# Patient Record
Sex: FEMALE | Race: WHITE | NOT HISPANIC OR LATINO | URBAN - METROPOLITAN AREA
[De-identification: names, ages, dates, MRNs, and addresses within clinical notes are randomized per-mention and may not be internally consistent; named-entity substitution may affect disease eponyms.]

---

## 2016-03-26 RX ORDER — CARVEDILOL PHOSPHATE 80 MG/1
1 CAPSULE, EXTENDED RELEASE ORAL
Qty: 60 | Refills: 0 | COMMUNITY
Start: 2016-03-26 | End: 2016-04-25

## 2016-03-26 RX ORDER — CARVEDILOL PHOSPHATE 80 MG/1
25 CAPSULE, EXTENDED RELEASE ORAL
Qty: 60 | Refills: 0 | COMMUNITY
Start: 2016-03-26 | End: 2016-04-25

## 2017-02-07 ENCOUNTER — OUTPATIENT (OUTPATIENT)
Dept: OUTPATIENT SERVICES | Facility: HOSPITAL | Age: 62
LOS: 1 days | Discharge: HOME | End: 2017-02-07
Payer: COMMERCIAL

## 2017-02-07 VITALS — WEIGHT: 173.06 LBS | HEIGHT: 60 IN

## 2017-02-07 DIAGNOSIS — Z98.890 OTHER SPECIFIED POSTPROCEDURAL STATES: Chronic | ICD-10-CM

## 2017-02-07 DIAGNOSIS — T85.112A BREAKDOWN (MECHANICAL) OF IMPLANTED ELECTRONIC NEUROSTIMULATOR OF SPINAL CORD ELECTRODE (LEAD), INITIAL ENCOUNTER: ICD-10-CM

## 2017-02-07 DIAGNOSIS — Z95.0 PRESENCE OF CARDIAC PACEMAKER: Chronic | ICD-10-CM

## 2017-02-07 DIAGNOSIS — Z98.89 OTHER SPECIFIED POSTPROCEDURAL STATES: Chronic | ICD-10-CM

## 2017-02-07 DIAGNOSIS — Z90.710 ACQUIRED ABSENCE OF BOTH CERVIX AND UTERUS: Chronic | ICD-10-CM

## 2017-02-07 LAB
ANION GAP SERPL CALC-SCNC: 9 MMOL/L — SIGNIFICANT CHANGE UP (ref 5–17)
BASOPHILS # BLD AUTO: 0.1 K/UL — SIGNIFICANT CHANGE UP (ref 0–0.2)
BASOPHILS NFR BLD AUTO: 0.9 % — SIGNIFICANT CHANGE UP (ref 0–2)
BUN SERPL-MCNC: 19 MG/DL — SIGNIFICANT CHANGE UP (ref 7–23)
CALCIUM SERPL-MCNC: 9 MG/DL — SIGNIFICANT CHANGE UP (ref 8.5–10.1)
CHLORIDE SERPL-SCNC: 108 MMOL/L — SIGNIFICANT CHANGE UP (ref 96–108)
CO2 SERPL-SCNC: 26 MMOL/L — SIGNIFICANT CHANGE UP (ref 22–31)
CREAT SERPL-MCNC: 0.9 MG/DL — SIGNIFICANT CHANGE UP (ref 0.5–1.3)
EOSINOPHIL # BLD AUTO: 0.1 K/UL — SIGNIFICANT CHANGE UP (ref 0–0.5)
EOSINOPHIL NFR BLD AUTO: 1.5 % — SIGNIFICANT CHANGE UP (ref 0–6)
GLUCOSE SERPL-MCNC: 80 MG/DL — SIGNIFICANT CHANGE UP (ref 70–99)
HCT VFR BLD CALC: 41.9 % — SIGNIFICANT CHANGE UP (ref 34.5–45)
HGB BLD-MCNC: 12.1 G/DL — SIGNIFICANT CHANGE UP (ref 11.5–15.5)
INR BLD: 0.97 RATIO — SIGNIFICANT CHANGE UP (ref 0.88–1.16)
LYMPHOCYTES # BLD AUTO: 2.4 K/UL — SIGNIFICANT CHANGE UP (ref 1–3.3)
LYMPHOCYTES # BLD AUTO: 31.8 % — SIGNIFICANT CHANGE UP (ref 13–44)
MCHC RBC-ENTMCNC: 18.8 PG — LOW (ref 27–34)
MCHC RBC-ENTMCNC: 28.8 GM/DL — LOW (ref 32–36)
MCV RBC AUTO: 65 FL — LOW (ref 80–100)
MONOCYTES # BLD AUTO: 0.5 K/UL — SIGNIFICANT CHANGE UP (ref 0–0.9)
MONOCYTES NFR BLD AUTO: 6.8 % — SIGNIFICANT CHANGE UP (ref 2–14)
NEUTROPHILS # BLD AUTO: 4.5 K/UL — SIGNIFICANT CHANGE UP (ref 1.8–7.4)
NEUTROPHILS NFR BLD AUTO: 59 % — SIGNIFICANT CHANGE UP (ref 43–77)
PLATELET # BLD AUTO: 256 K/UL — SIGNIFICANT CHANGE UP (ref 150–400)
POTASSIUM SERPL-MCNC: 4 MMOL/L — SIGNIFICANT CHANGE UP (ref 3.5–5.3)
POTASSIUM SERPL-SCNC: 4 MMOL/L — SIGNIFICANT CHANGE UP (ref 3.5–5.3)
PROTHROM AB SERPL-ACNC: 10.7 SEC — SIGNIFICANT CHANGE UP (ref 10–13.1)
RBC # BLD: 6.44 M/UL — HIGH (ref 3.8–5.2)
RBC # FLD: 14.2 % — SIGNIFICANT CHANGE UP (ref 10.3–14.5)
SODIUM SERPL-SCNC: 143 MMOL/L — SIGNIFICANT CHANGE UP (ref 135–145)
WBC # BLD: 7.6 K/UL — SIGNIFICANT CHANGE UP (ref 3.8–10.5)
WBC # FLD AUTO: 7.6 K/UL — SIGNIFICANT CHANGE UP (ref 3.8–10.5)

## 2017-02-07 PROCEDURE — 93010 ELECTROCARDIOGRAM REPORT: CPT

## 2017-02-07 NOTE — H&P PST ADULT - MOTOR
Strength grading:  Upper extremities B/L 5/5  Lower extremities right: 5/5, left lower extremity 4/5.

## 2017-02-07 NOTE — H&P PST ADULT - PSH
Artificial cardiac pacemaker  Reports "shut off."  H/O abdominal hysterectomy    H/O laminectomy    S/P spinal surgery  S/P spinal cord stimulator placed.

## 2017-02-07 NOTE — H&P PST ADULT - ASSESSMENT
This is a 61 y/o  female who presents to [facility] prior to proposed procedure with Dr. Stern for removal of spinal cord stimulator, electrodes and battery.    1. Labs, MRSA swab: per Dr. Stern.  2. EKG to be reviewed by Cardiology.  3. Clearance with Dr. Singh as scheduled [phone].  4. EZ sponges, holistic sheet, and day of procedure instructions provided and reviewed with patient.

## 2017-02-07 NOTE — H&P PST ADULT - HISTORY OF PRESENT ILLNESS
This is a 61 y/o  female who presents to [facility] prior to proposed procedure with Dr. Stern for removal of spinal cord stimulator, electrodes and battery. Reports PMHX: lumbar disc disorder s/p spinal cord stimulator 7/2016. Reports lower back pain is "getting worse", with back pain which radiates to left buttock to lower extremity. Reports recent urinary incontinence, and wears adult diapers. Denies problems with defecation Reports lower back pain is slightly decreased with Oxycodone 10mg every 4 hours as directed. Evaluated by Dr. Stern and now presents for said procedure.

## 2017-02-07 NOTE — H&P PST ADULT - BACK COMMENTS
Mid-spine palpation with + tenderness to thoracic, lumbosacral region and to left buttock spinal stimulator site.

## 2017-02-07 NOTE — H&P PST ADULT - FAMILY HISTORY
Father  Still living? Unknown  Family history of diabetes mellitus (DM), Age at diagnosis: Age Unknown     Mother  Still living? Unknown  Family history of hypertension, Age at diagnosis: Age Unknown  Family history of cerebrovascular accident (CVA), Age at diagnosis: Age Unknown     Sibling  Still living? Unknown  Family history of uterine cancer, Age at diagnosis: Age Unknown

## 2017-02-07 NOTE — H&P PST ADULT - PMH
CAD (coronary artery disease)    CVA (cerebral vascular accident)    HTN (hypertension)    Low back pain with left-sided sciatica, unspecified back pain laterality, unspecified chronicity    Malignant neoplasm of ovary, unspecified laterality    Pacemaker  reports shut off  Stented coronary artery    Thalassemia, unspecified type    Urinary incontinence, unspecified type    Varicose veins  left lower extremity

## 2017-02-08 LAB
MRSA PCR RESULT.: SIGNIFICANT CHANGE UP
S AUREUS DNA NOSE QL NAA+PROBE: SIGNIFICANT CHANGE UP

## 2017-02-14 ENCOUNTER — RESULT REVIEW (OUTPATIENT)
Age: 62
End: 2017-02-14

## 2017-02-14 RX ORDER — OXYCODONE HYDROCHLORIDE 5 MG/1
10 TABLET ORAL EVERY 6 HOURS
Qty: 0 | Refills: 0 | Status: DISCONTINUED | OUTPATIENT
Start: 2017-02-15 | End: 2017-02-15

## 2017-02-14 RX ORDER — FENTANYL CITRATE 50 UG/ML
50 INJECTION INTRAVENOUS
Qty: 0 | Refills: 0 | Status: DISCONTINUED | OUTPATIENT
Start: 2017-02-15 | End: 2017-02-15

## 2017-02-15 ENCOUNTER — OUTPATIENT (OUTPATIENT)
Dept: OUTPATIENT SERVICES | Facility: HOSPITAL | Age: 62
LOS: 1 days | Discharge: HOME | End: 2017-02-15
Payer: COMMERCIAL

## 2017-02-15 VITALS
OXYGEN SATURATION: 99 % | DIASTOLIC BLOOD PRESSURE: 64 MMHG | SYSTOLIC BLOOD PRESSURE: 118 MMHG | HEART RATE: 63 BPM | RESPIRATION RATE: 16 BRPM

## 2017-02-15 VITALS
HEART RATE: 64 BPM | RESPIRATION RATE: 16 BRPM | OXYGEN SATURATION: 100 % | WEIGHT: 176.37 LBS | DIASTOLIC BLOOD PRESSURE: 80 MMHG | SYSTOLIC BLOOD PRESSURE: 146 MMHG | TEMPERATURE: 98 F

## 2017-02-15 DIAGNOSIS — Z98.890 OTHER SPECIFIED POSTPROCEDURAL STATES: Chronic | ICD-10-CM

## 2017-02-15 DIAGNOSIS — Z98.89 OTHER SPECIFIED POSTPROCEDURAL STATES: Chronic | ICD-10-CM

## 2017-02-15 DIAGNOSIS — Z95.0 PRESENCE OF CARDIAC PACEMAKER: Chronic | ICD-10-CM

## 2017-02-15 DIAGNOSIS — Z90.710 ACQUIRED ABSENCE OF BOTH CERVIX AND UTERUS: Chronic | ICD-10-CM

## 2017-02-15 PROCEDURE — 88300 SURGICAL PATH GROSS: CPT | Mod: 26

## 2017-02-15 RX ORDER — SODIUM CHLORIDE 9 MG/ML
1000 INJECTION, SOLUTION INTRAVENOUS
Qty: 0 | Refills: 0 | Status: DISCONTINUED | OUTPATIENT
Start: 2017-02-15 | End: 2017-03-02

## 2017-02-15 RX ORDER — IBUPROFEN 200 MG
400 TABLET ORAL EVERY 8 HOURS
Qty: 0 | Refills: 0 | Status: DISCONTINUED | OUTPATIENT
Start: 2017-02-15 | End: 2017-03-02

## 2017-02-15 RX ORDER — ONDANSETRON 8 MG/1
4 TABLET, FILM COATED ORAL EVERY 6 HOURS
Qty: 0 | Refills: 0 | Status: DISCONTINUED | OUTPATIENT
Start: 2017-02-15 | End: 2017-03-02

## 2017-02-15 RX ORDER — ONDANSETRON 8 MG/1
4 TABLET, FILM COATED ORAL ONCE
Qty: 0 | Refills: 0 | Status: DISCONTINUED | OUTPATIENT
Start: 2017-02-15 | End: 2017-02-15

## 2017-02-15 RX ORDER — SODIUM CHLORIDE 9 MG/ML
1000 INJECTION, SOLUTION INTRAVENOUS
Qty: 0 | Refills: 0 | Status: DISCONTINUED | OUTPATIENT
Start: 2017-02-15 | End: 2017-02-15

## 2017-02-15 RX ORDER — HYDROMORPHONE HYDROCHLORIDE 2 MG/ML
2 INJECTION INTRAMUSCULAR; INTRAVENOUS; SUBCUTANEOUS EVERY 4 HOURS
Qty: 0 | Refills: 0 | Status: DISCONTINUED | OUTPATIENT
Start: 2017-02-15 | End: 2017-02-15

## 2017-02-15 RX ORDER — OXYCODONE HYDROCHLORIDE 5 MG/1
20 TABLET ORAL EVERY 12 HOURS
Qty: 0 | Refills: 0 | Status: DISCONTINUED | OUTPATIENT
Start: 2017-02-15 | End: 2017-02-15

## 2017-02-15 RX ADMIN — OXYCODONE HYDROCHLORIDE 20 MILLIGRAM(S): 5 TABLET ORAL at 07:39

## 2017-02-15 RX ADMIN — OXYCODONE HYDROCHLORIDE 10 MILLIGRAM(S): 5 TABLET ORAL at 10:05

## 2017-02-15 RX ADMIN — SODIUM CHLORIDE 75 MILLILITER(S): 9 INJECTION, SOLUTION INTRAVENOUS at 10:39

## 2017-02-15 RX ADMIN — FENTANYL CITRATE 50 MICROGRAM(S): 50 INJECTION INTRAVENOUS at 10:15

## 2017-02-15 RX ADMIN — FENTANYL CITRATE 50 MICROGRAM(S): 50 INJECTION INTRAVENOUS at 10:05

## 2017-02-15 NOTE — BRIEF OPERATIVE NOTE - PROCEDURE
Spinal cord stimulator replacement  02/15/2017  Removal or lumbar spinal cord stimulator, electrodes and battery, flouroscopy  Active  NERIS

## 2017-02-15 NOTE — ASU DISCHARGE PLAN (ADULT/PEDIATRIC). - NURSING INSTRUCTIONS
Begin with liquids and light food ( tea, toast, Jello, soups). Advance to what you normally eat. Liquids should taken in adequate amounts today.     CALL the DOCTOR:    -Fever greater than  101F  - Signs  of infection such as : increase pain,swelling,redness,or a bad  smell coming from the wound.  -Excessive amount of bleeding.  - Any pain that appears to be getting worse.  - Vomiting  -  If you have  not urinated 8 hours after surgery or have any difficulty urinating.     A responsible adult should be with you for the rest of the day and night for your safety and to help you if you needed.    Review attached FACT SHEET if applicable.

## 2017-02-22 DIAGNOSIS — Z88.8 ALLERGY STATUS TO OTHER DRUGS, MEDICAMENTS AND BIOLOGICAL SUBSTANCES STATUS: ICD-10-CM

## 2017-02-22 DIAGNOSIS — G89.28 OTHER CHRONIC POSTPROCEDURAL PAIN: ICD-10-CM

## 2017-02-22 DIAGNOSIS — T85.192A OTHER MECHANICAL COMPLICATION OF IMPLANTED ELECTRONIC NEUROSTIMULATOR OF SPINAL CORD ELECTRODE (LEAD), INITIAL ENCOUNTER: ICD-10-CM

## 2017-02-22 DIAGNOSIS — M96.1 POSTLAMINECTOMY SYNDROME, NOT ELSEWHERE CLASSIFIED: ICD-10-CM
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
## 2017-02-22 DIAGNOSIS — Z98.1 ARTHRODESIS STATUS: ICD-10-CM

## 2017-02-22 DIAGNOSIS — I10 ESSENTIAL (PRIMARY) HYPERTENSION: ICD-10-CM

## 2017-02-22 LAB — SURGICAL PATHOLOGY FINAL REPORT - CH: SIGNIFICANT CHANGE UP

## 2017-03-23 ENCOUNTER — OUTPATIENT (OUTPATIENT)
Dept: OUTPATIENT SERVICES | Facility: HOSPITAL | Age: 62
LOS: 1 days | End: 2017-03-23

## 2017-03-23 ENCOUNTER — OUTPATIENT (OUTPATIENT)
Dept: OUTPATIENT SERVICES | Facility: HOSPITAL | Age: 62
LOS: 1 days | End: 2017-03-23
Payer: COMMERCIAL

## 2017-03-23 DIAGNOSIS — M46.1 SACROILIITIS, NOT ELSEWHERE CLASSIFIED: ICD-10-CM

## 2017-03-23 DIAGNOSIS — Z98.890 OTHER SPECIFIED POSTPROCEDURAL STATES: Chronic | ICD-10-CM

## 2017-03-23 DIAGNOSIS — Z90.710 ACQUIRED ABSENCE OF BOTH CERVIX AND UTERUS: Chronic | ICD-10-CM

## 2017-03-23 DIAGNOSIS — Z95.0 PRESENCE OF CARDIAC PACEMAKER: Chronic | ICD-10-CM

## 2017-03-23 DIAGNOSIS — Z98.89 OTHER SPECIFIED POSTPROCEDURAL STATES: Chronic | ICD-10-CM

## 2017-04-06 ENCOUNTER — TRANSCRIPTION ENCOUNTER (OUTPATIENT)
Age: 62
End: 2017-04-06

## 2017-04-06 ENCOUNTER — OUTPATIENT (OUTPATIENT)
Dept: OUTPATIENT SERVICES | Facility: HOSPITAL | Age: 62
LOS: 1 days | End: 2017-04-06
Payer: COMMERCIAL

## 2017-04-06 DIAGNOSIS — Z98.890 OTHER SPECIFIED POSTPROCEDURAL STATES: Chronic | ICD-10-CM

## 2017-04-06 DIAGNOSIS — Z95.0 PRESENCE OF CARDIAC PACEMAKER: Chronic | ICD-10-CM

## 2017-04-06 DIAGNOSIS — Z98.89 OTHER SPECIFIED POSTPROCEDURAL STATES: Chronic | ICD-10-CM

## 2017-04-06 DIAGNOSIS — Z90.710 ACQUIRED ABSENCE OF BOTH CERVIX AND UTERUS: Chronic | ICD-10-CM

## 2017-04-06 DIAGNOSIS — M47.817 SPONDYLOSIS WITHOUT MYELOPATHY OR RADICULOPATHY, LUMBOSACRAL REGION: ICD-10-CM

## 2017-04-06 PROCEDURE — 64494 INJ PARAVERT F JNT L/S 2 LEV: CPT | Mod: 50

## 2017-04-06 PROCEDURE — 64493 INJ PARAVERT F JNT L/S 1 LEV: CPT | Mod: 50

## 2017-04-06 PROCEDURE — 76000 FLUOROSCOPY <1 HR PHYS/QHP: CPT

## 2017-04-20 ENCOUNTER — OUTPATIENT (OUTPATIENT)
Dept: OUTPATIENT SERVICES | Facility: HOSPITAL | Age: 62
LOS: 1 days | End: 2017-04-20
Payer: COMMERCIAL

## 2017-04-20 ENCOUNTER — TRANSCRIPTION ENCOUNTER (OUTPATIENT)
Age: 62
End: 2017-04-20

## 2017-04-20 DIAGNOSIS — Z95.0 PRESENCE OF CARDIAC PACEMAKER: Chronic | ICD-10-CM

## 2017-04-20 DIAGNOSIS — Z98.89 OTHER SPECIFIED POSTPROCEDURAL STATES: Chronic | ICD-10-CM

## 2017-04-20 DIAGNOSIS — Z98.890 OTHER SPECIFIED POSTPROCEDURAL STATES: Chronic | ICD-10-CM

## 2017-04-20 DIAGNOSIS — Z90.710 ACQUIRED ABSENCE OF BOTH CERVIX AND UTERUS: Chronic | ICD-10-CM

## 2017-04-20 DIAGNOSIS — M46.1 SACROILIITIS, NOT ELSEWHERE CLASSIFIED: ICD-10-CM

## 2017-04-20 PROCEDURE — G0260: CPT | Mod: 50

## 2017-04-20 PROCEDURE — 76000 FLUOROSCOPY <1 HR PHYS/QHP: CPT

## 2017-04-27 ENCOUNTER — OUTPATIENT (OUTPATIENT)
Dept: OUTPATIENT SERVICES | Facility: HOSPITAL | Age: 62
LOS: 1 days | End: 2017-04-27
Payer: COMMERCIAL

## 2017-04-27 DIAGNOSIS — M46.1 SACROILIITIS, NOT ELSEWHERE CLASSIFIED: ICD-10-CM

## 2017-04-27 DIAGNOSIS — Z98.890 OTHER SPECIFIED POSTPROCEDURAL STATES: Chronic | ICD-10-CM

## 2017-04-27 DIAGNOSIS — Z98.89 OTHER SPECIFIED POSTPROCEDURAL STATES: Chronic | ICD-10-CM

## 2017-04-27 DIAGNOSIS — Z95.0 PRESENCE OF CARDIAC PACEMAKER: Chronic | ICD-10-CM

## 2017-04-27 DIAGNOSIS — M54.16 RADICULOPATHY, LUMBAR REGION: ICD-10-CM

## 2017-04-27 DIAGNOSIS — Z90.710 ACQUIRED ABSENCE OF BOTH CERVIX AND UTERUS: Chronic | ICD-10-CM

## 2017-04-28 ENCOUNTER — TRANSCRIPTION ENCOUNTER (OUTPATIENT)
Age: 62
End: 2017-04-28

## 2017-05-11 ENCOUNTER — OUTPATIENT (OUTPATIENT)
Dept: OUTPATIENT SERVICES | Facility: HOSPITAL | Age: 62
LOS: 1 days | End: 2017-05-11

## 2017-05-11 ENCOUNTER — TRANSCRIPTION ENCOUNTER (OUTPATIENT)
Age: 62
End: 2017-05-11

## 2017-05-11 DIAGNOSIS — Z98.89 OTHER SPECIFIED POSTPROCEDURAL STATES: Chronic | ICD-10-CM

## 2017-05-11 DIAGNOSIS — M47.817 SPONDYLOSIS WITHOUT MYELOPATHY OR RADICULOPATHY, LUMBOSACRAL REGION: ICD-10-CM

## 2017-05-11 DIAGNOSIS — Z90.710 ACQUIRED ABSENCE OF BOTH CERVIX AND UTERUS: Chronic | ICD-10-CM

## 2017-05-11 DIAGNOSIS — Z95.0 PRESENCE OF CARDIAC PACEMAKER: Chronic | ICD-10-CM

## 2017-05-11 DIAGNOSIS — Z98.890 OTHER SPECIFIED POSTPROCEDURAL STATES: Chronic | ICD-10-CM

## 2017-05-11 PROCEDURE — 76000 FLUOROSCOPY <1 HR PHYS/QHP: CPT

## 2017-05-12 ENCOUNTER — INPATIENT (INPATIENT)
Facility: HOSPITAL | Age: 62
LOS: 10 days | Discharge: NURSING, REHAB OR LONG-TERM CARE | DRG: 331 | End: 2017-05-23
Attending: SURGERY | Admitting: SURGERY
Payer: MEDICARE

## 2017-05-12 ENCOUNTER — RESULT REVIEW (OUTPATIENT)
Age: 62
End: 2017-05-12

## 2017-05-12 VITALS
HEART RATE: 71 BPM | OXYGEN SATURATION: 98 % | RESPIRATION RATE: 16 BRPM | WEIGHT: 175.05 LBS | DIASTOLIC BLOOD PRESSURE: 84 MMHG | TEMPERATURE: 98 F | SYSTOLIC BLOOD PRESSURE: 184 MMHG

## 2017-05-12 DIAGNOSIS — K56.69 OTHER INTESTINAL OBSTRUCTION: ICD-10-CM

## 2017-05-12 DIAGNOSIS — Z98.89 OTHER SPECIFIED POSTPROCEDURAL STATES: Chronic | ICD-10-CM

## 2017-05-12 DIAGNOSIS — Z98.890 OTHER SPECIFIED POSTPROCEDURAL STATES: Chronic | ICD-10-CM

## 2017-05-12 DIAGNOSIS — Z95.0 PRESENCE OF CARDIAC PACEMAKER: Chronic | ICD-10-CM

## 2017-05-12 DIAGNOSIS — Z90.710 ACQUIRED ABSENCE OF BOTH CERVIX AND UTERUS: Chronic | ICD-10-CM

## 2017-05-12 LAB
ANION GAP SERPL CALC-SCNC: 16 MMOL/L — SIGNIFICANT CHANGE UP (ref 5–17)
APTT BLD: 32.9 SEC — SIGNIFICANT CHANGE UP (ref 27.5–37.4)
BLD GP AB SCN SERPL QL: SIGNIFICANT CHANGE UP
BUN SERPL-MCNC: 19 MG/DL — SIGNIFICANT CHANGE UP (ref 8–20)
CALCIUM SERPL-MCNC: 9.4 MG/DL — SIGNIFICANT CHANGE UP (ref 8.6–10.2)
CHLORIDE SERPL-SCNC: 102 MMOL/L — SIGNIFICANT CHANGE UP (ref 98–107)
CO2 SERPL-SCNC: 24 MMOL/L — SIGNIFICANT CHANGE UP (ref 22–29)
CREAT SERPL-MCNC: 0.74 MG/DL — SIGNIFICANT CHANGE UP (ref 0.5–1.3)
GLUCOSE SERPL-MCNC: 127 MG/DL — HIGH (ref 70–115)
HCT VFR BLD CALC: 36.9 % — LOW (ref 37–47)
HGB BLD-MCNC: 11.9 G/DL — LOW (ref 12–16)
INR BLD: 1.05 RATIO — SIGNIFICANT CHANGE UP (ref 0.88–1.16)
LACTATE BLDV-MCNC: 0.7 MMOL/L — SIGNIFICANT CHANGE UP (ref 0.7–2)
MCHC RBC-ENTMCNC: 20.8 PG — LOW (ref 27–31)
MCHC RBC-ENTMCNC: 32.2 G/DL — SIGNIFICANT CHANGE UP (ref 32–36)
MCV RBC AUTO: 64.5 FL — LOW (ref 81–99)
PLATELET # BLD AUTO: 209 K/UL — SIGNIFICANT CHANGE UP (ref 150–400)
POTASSIUM SERPL-MCNC: 4.1 MMOL/L — SIGNIFICANT CHANGE UP (ref 3.5–5.3)
POTASSIUM SERPL-SCNC: 4.1 MMOL/L — SIGNIFICANT CHANGE UP (ref 3.5–5.3)
PROTHROM AB SERPL-ACNC: 11.6 SEC — SIGNIFICANT CHANGE UP (ref 9.8–12.7)
RBC # BLD: 5.72 M/UL — HIGH (ref 4.4–5.2)
RBC # FLD: 17.1 % — HIGH (ref 11–15.6)
SODIUM SERPL-SCNC: 142 MMOL/L — SIGNIFICANT CHANGE UP (ref 135–145)
TYPE + AB SCN PNL BLD: SIGNIFICANT CHANGE UP
WBC # BLD: 12.6 K/UL — HIGH (ref 4.8–10.8)
WBC # FLD AUTO: 12.6 K/UL — HIGH (ref 4.8–10.8)

## 2017-05-12 PROCEDURE — 99223 1ST HOSP IP/OBS HIGH 75: CPT | Mod: 57,GC

## 2017-05-12 PROCEDURE — 62323 NJX INTERLAMINAR LMBR/SAC: CPT

## 2017-05-12 PROCEDURE — 77003 FLUOROGUIDE FOR SPINE INJECT: CPT

## 2017-05-12 PROCEDURE — 44625 REPAIR BOWEL OPENING: CPT

## 2017-05-12 PROCEDURE — 71010: CPT | Mod: 26

## 2017-05-12 PROCEDURE — 74177 CT ABD & PELVIS W/CONTRAST: CPT | Mod: 26

## 2017-05-12 PROCEDURE — 88307 TISSUE EXAM BY PATHOLOGIST: CPT | Mod: 26

## 2017-05-12 PROCEDURE — 99285 EMERGENCY DEPT VISIT HI MDM: CPT

## 2017-05-12 PROCEDURE — 93010 ELECTROCARDIOGRAM REPORT: CPT

## 2017-05-12 PROCEDURE — 72131 CT LUMBAR SPINE W/O DYE: CPT | Mod: 26

## 2017-05-12 RX ORDER — FENTANYL CITRATE 50 UG/ML
25 INJECTION INTRAVENOUS ONCE
Qty: 0 | Refills: 0 | Status: DISCONTINUED | OUTPATIENT
Start: 2017-05-12 | End: 2017-05-12

## 2017-05-12 RX ORDER — NALOXONE HYDROCHLORIDE 4 MG/.1ML
0.1 SPRAY NASAL
Qty: 0 | Refills: 0 | Status: DISCONTINUED | OUTPATIENT
Start: 2017-05-12 | End: 2017-05-23

## 2017-05-12 RX ORDER — METOCLOPRAMIDE HCL 10 MG
10 TABLET ORAL ONCE
Qty: 0 | Refills: 0 | Status: COMPLETED | OUTPATIENT
Start: 2017-05-12 | End: 2017-05-12

## 2017-05-12 RX ORDER — ENOXAPARIN SODIUM 100 MG/ML
40 INJECTION SUBCUTANEOUS DAILY
Qty: 0 | Refills: 0 | Status: DISCONTINUED | OUTPATIENT
Start: 2017-05-12 | End: 2017-05-23

## 2017-05-12 RX ORDER — CEFOTETAN DISODIUM 1 G
3 VIAL (EA) INJECTION ONCE
Qty: 0 | Refills: 0 | Status: DISCONTINUED | OUTPATIENT
Start: 2017-05-12 | End: 2017-05-12

## 2017-05-12 RX ORDER — ONDANSETRON 8 MG/1
4 TABLET, FILM COATED ORAL ONCE
Qty: 0 | Refills: 0 | Status: DISCONTINUED | OUTPATIENT
Start: 2017-05-12 | End: 2017-05-13

## 2017-05-12 RX ORDER — METOPROLOL TARTRATE 50 MG
5 TABLET ORAL EVERY 6 HOURS
Qty: 0 | Refills: 0 | Status: DISCONTINUED | OUTPATIENT
Start: 2017-05-12 | End: 2017-05-15

## 2017-05-12 RX ORDER — SODIUM CHLORIDE 9 MG/ML
1000 INJECTION INTRAMUSCULAR; INTRAVENOUS; SUBCUTANEOUS
Qty: 0 | Refills: 0 | Status: DISCONTINUED | OUTPATIENT
Start: 2017-05-12 | End: 2017-05-12

## 2017-05-12 RX ORDER — SODIUM CHLORIDE 9 MG/ML
3 INJECTION INTRAMUSCULAR; INTRAVENOUS; SUBCUTANEOUS ONCE
Qty: 0 | Refills: 0 | Status: COMPLETED | OUTPATIENT
Start: 2017-05-12 | End: 2017-05-12

## 2017-05-12 RX ORDER — SODIUM CHLORIDE 9 MG/ML
1000 INJECTION, SOLUTION INTRAVENOUS
Qty: 0 | Refills: 0 | Status: DISCONTINUED | OUTPATIENT
Start: 2017-05-12 | End: 2017-05-18

## 2017-05-12 RX ORDER — CEFOTETAN DISODIUM 1 G
2 VIAL (EA) INJECTION ONCE
Qty: 0 | Refills: 0 | Status: DISCONTINUED | OUTPATIENT
Start: 2017-05-12 | End: 2017-05-12

## 2017-05-12 RX ORDER — ENOXAPARIN SODIUM 100 MG/ML
30 INJECTION SUBCUTANEOUS EVERY 12 HOURS
Qty: 0 | Refills: 0 | Status: DISCONTINUED | OUTPATIENT
Start: 2017-05-12 | End: 2017-05-12

## 2017-05-12 RX ORDER — ONDANSETRON 8 MG/1
4 TABLET, FILM COATED ORAL EVERY 6 HOURS
Qty: 0 | Refills: 0 | Status: COMPLETED | OUTPATIENT
Start: 2017-05-12 | End: 2017-05-12

## 2017-05-12 RX ORDER — PANTOPRAZOLE SODIUM 20 MG/1
40 TABLET, DELAYED RELEASE ORAL DAILY
Qty: 0 | Refills: 0 | Status: DISCONTINUED | OUTPATIENT
Start: 2017-05-12 | End: 2017-05-21

## 2017-05-12 RX ORDER — SODIUM CHLORIDE 9 MG/ML
1000 INJECTION, SOLUTION INTRAVENOUS
Qty: 0 | Refills: 0 | Status: DISCONTINUED | OUTPATIENT
Start: 2017-05-12 | End: 2017-05-12

## 2017-05-12 RX ORDER — SODIUM CHLORIDE 9 MG/ML
1000 INJECTION, SOLUTION INTRAVENOUS
Qty: 0 | Refills: 0 | Status: DISCONTINUED | OUTPATIENT
Start: 2017-05-12 | End: 2017-05-13

## 2017-05-12 RX ORDER — HYDROMORPHONE HYDROCHLORIDE 2 MG/ML
30 INJECTION INTRAMUSCULAR; INTRAVENOUS; SUBCUTANEOUS
Qty: 0 | Refills: 0 | Status: DISCONTINUED | OUTPATIENT
Start: 2017-05-12 | End: 2017-05-16

## 2017-05-12 RX ORDER — ACETAMINOPHEN 500 MG
1000 TABLET ORAL ONCE
Qty: 0 | Refills: 0 | Status: COMPLETED | OUTPATIENT
Start: 2017-05-12 | End: 2017-05-12

## 2017-05-12 RX ORDER — HYDROMORPHONE HYDROCHLORIDE 2 MG/ML
0.5 INJECTION INTRAMUSCULAR; INTRAVENOUS; SUBCUTANEOUS
Qty: 0 | Refills: 0 | Status: DISCONTINUED | OUTPATIENT
Start: 2017-05-12 | End: 2017-05-13

## 2017-05-12 RX ORDER — FENTANYL CITRATE 50 UG/ML
50 INJECTION INTRAVENOUS
Qty: 0 | Refills: 0 | Status: DISCONTINUED | OUTPATIENT
Start: 2017-05-12 | End: 2017-05-13

## 2017-05-12 RX ORDER — FENTANYL CITRATE 50 UG/ML
25 INJECTION INTRAVENOUS
Qty: 0 | Refills: 0 | Status: DISCONTINUED | OUTPATIENT
Start: 2017-05-12 | End: 2017-05-13

## 2017-05-12 RX ORDER — MORPHINE SULFATE 50 MG/1
2 CAPSULE, EXTENDED RELEASE ORAL EVERY 4 HOURS
Qty: 0 | Refills: 0 | Status: DISCONTINUED | OUTPATIENT
Start: 2017-05-12 | End: 2017-05-12

## 2017-05-12 RX ORDER — ONDANSETRON 8 MG/1
4 TABLET, FILM COATED ORAL EVERY 6 HOURS
Qty: 0 | Refills: 0 | Status: DISCONTINUED | OUTPATIENT
Start: 2017-05-12 | End: 2017-05-23

## 2017-05-12 RX ORDER — CEFOTETAN DISODIUM 1 G
2 VIAL (EA) INJECTION EVERY 12 HOURS
Qty: 0 | Refills: 0 | Status: COMPLETED | OUTPATIENT
Start: 2017-05-12 | End: 2017-05-13

## 2017-05-12 RX ORDER — OXYCODONE HYDROCHLORIDE 5 MG/1
2 TABLET ORAL
Qty: 0 | Refills: 0 | COMMUNITY

## 2017-05-12 RX ADMIN — Medication 5 MILLIGRAM(S): at 23:23

## 2017-05-12 RX ADMIN — SODIUM CHLORIDE 125 MILLILITER(S): 9 INJECTION, SOLUTION INTRAVENOUS at 23:20

## 2017-05-12 RX ADMIN — Medication 400 MILLIGRAM(S): at 16:02

## 2017-05-12 RX ADMIN — Medication 400 MILLIGRAM(S): at 20:30

## 2017-05-12 RX ADMIN — FENTANYL CITRATE 25 MICROGRAM(S): 50 INJECTION INTRAVENOUS at 17:49

## 2017-05-12 RX ADMIN — ONDANSETRON 4 MILLIGRAM(S): 8 TABLET, FILM COATED ORAL at 17:56

## 2017-05-12 RX ADMIN — Medication 10 MILLIGRAM(S): at 11:42

## 2017-05-12 RX ADMIN — FENTANYL CITRATE 25 MICROGRAM(S): 50 INJECTION INTRAVENOUS at 12:07

## 2017-05-12 RX ADMIN — FENTANYL CITRATE 25 MICROGRAM(S): 50 INJECTION INTRAVENOUS at 18:10

## 2017-05-12 RX ADMIN — MORPHINE SULFATE 2 MILLIGRAM(S): 50 CAPSULE, EXTENDED RELEASE ORAL at 17:49

## 2017-05-12 RX ADMIN — FENTANYL CITRATE 25 MICROGRAM(S): 50 INJECTION INTRAVENOUS at 16:02

## 2017-05-12 RX ADMIN — SODIUM CHLORIDE 3 MILLILITER(S): 9 INJECTION INTRAMUSCULAR; INTRAVENOUS; SUBCUTANEOUS at 11:42

## 2017-05-12 RX ADMIN — SODIUM CHLORIDE 100 MILLILITER(S): 9 INJECTION INTRAMUSCULAR; INTRAVENOUS; SUBCUTANEOUS at 15:59

## 2017-05-12 RX ADMIN — FENTANYL CITRATE 25 MICROGRAM(S): 50 INJECTION INTRAVENOUS at 11:42

## 2017-05-12 RX ADMIN — FENTANYL CITRATE 25 MICROGRAM(S): 50 INJECTION INTRAVENOUS at 12:06

## 2017-05-12 RX ADMIN — MORPHINE SULFATE 2 MILLIGRAM(S): 50 CAPSULE, EXTENDED RELEASE ORAL at 18:11

## 2017-05-12 RX ADMIN — HYDROMORPHONE HYDROCHLORIDE 0.5 MILLIGRAM(S): 2 INJECTION INTRAMUSCULAR; INTRAVENOUS; SUBCUTANEOUS at 23:00

## 2017-05-12 RX ADMIN — HYDROMORPHONE HYDROCHLORIDE 30 MILLILITER(S): 2 INJECTION INTRAMUSCULAR; INTRAVENOUS; SUBCUTANEOUS at 23:16

## 2017-05-12 RX ADMIN — Medication 1000 MILLIGRAM(S): at 16:58

## 2017-05-12 RX ADMIN — HYDROMORPHONE HYDROCHLORIDE 0.5 MILLIGRAM(S): 2 INJECTION INTRAMUSCULAR; INTRAVENOUS; SUBCUTANEOUS at 23:05

## 2017-05-12 NOTE — H&P ADULT - HISTORY OF PRESENT ILLNESS
61 y/o F with chronic back pain presented to ED with complaints of back pain, radiating to the front, dull aching and colicky in type, 10/10 on intensity a/w nausea and multiple episode of greenish yellow vomiting since yesterday after getting a epidural injection for her chronic back pain. She never had abdominal pain before  She had her last bowel movement yesterday  She had a accident few years back after which she is having severe pain in his back. She had multiple surgery of her back and taken 3 epidural injection in the past few months.  PMH: Stroke 3 years ago, thalassemia, HTN  PSH: hysterectomy with oophorectomy  She has a non working pacemaker in place 61 y/o F with chronic back pain presented to ED with complaints of back pain, radiating to the front, dull aching and colicky in type, 10/10 on intensity a/w nausea and multiple episode of greenish yellow vomiting since yesterday after getting a epidural injection for her chronic back pain. She never had abdominal pain before  She had her last bowel movement yesterday  She had a accident few years back after which she is having severe pain in his back. She had multiple surgery of her back and taken 3 epidural injection in the past few months.  PMH: Stroke 3 years ago, thalassemia, HTN  PSH: hysterectomy with oophorectomy, gastric bypass >30yr ago, cholecystectomy   She has a non working pacemaker in place 63 y/o F with chronic back pain presented to ED with complaints of back pain, radiating to the front, dull aching and colicky in type, 10/10 on intensity a/w nausea and multiple episode of greenish yellow vomiting since yesterday after getting a epidural injection for her chronic back pain. She never had abdominal pain before but her family endorses episodes of "obstruction."   She had her last bowel movement yesterday.  Her last meal was lunch.    She had a accident few years back after which she is having severe pain in his back. She had multiple surgery of her back and taken 3 epidural injection in the past few months.  PMH: Stroke 3 years ago, thalassemia, HTN  PSH: hysterectomy with oophorectomy, gastric bypass >30yr ago, cholecystectomy   She has a non working pacemaker in place

## 2017-05-12 NOTE — ED PROVIDER NOTE - OBJECTIVE STATEMENT
PT STATES SHE HAD AN EPIDURAL YESTERDAY AND THIS MORNING WOKE UP WITH SEVERE LOWER ABDOMINAL PAIN AND NEW ONSET OF URINARY INCONTINENCE.  HAS NEVER HAD THIS KIND OF REACTION TO EPIDURAL. NO INCONTINENCE OF STOOL. NO FEVER. DESCRIBES PAIN AS SHARP AND STABBING AND GOING FROM THE BACK THROUGH TO THE LOWER ABDOMEN.

## 2017-05-12 NOTE — H&P ADULT - ATTENDING COMMENTS
I have read, reviewed, edited and agree c/ above H+P.  I have personally reviewed her CT scan and gone thru the images c/ Mrs. Lawler and her .  I have counseled them that c/ the injection of the mesenteric vessels, the dilation of the SB, her WBC count, and her significant focal tenderness are indications that she may have compromised bowel.  I have discussed the risks/benefits of urgent surgery c/ her, her neice, , and sister.  The risks include blood loss, infection, bowel resection, ostomy, need for further surgery, MI, stroke, death.  I have counseled them that the stress of surgery c/ the fluid shifts may cause stress on her heart and lungs that will necessitate further monitoring.  They understand the significant risks of surgery but also that this is an urgent, life saving procedure that needs to be done.  They are all agreeable to proceeding c/ surgery.

## 2017-05-12 NOTE — ED PROVIDER NOTE - MEDICAL DECISION MAKING DETAILS
SEVERE ABDOMINAL PAIN AND URINARY INCONTINENCE S/P EPIDURAL INJECTION YESTERDAY. NEEDS CT AB/PELVIS. WILL FOLLOW. * UNABLE TO HAVE MRI BC HAS PACEMAKER/ ELECTRIC MUSCLE STIMULATOR.

## 2017-05-12 NOTE — ED ADULT NURSE NOTE - OBJECTIVE STATEMENT
pt c/o back pain radiating to lower abdomen with nausea and vomiting. pt states that she had back procedure done yesterday by Dr. Jeff, pt has dressing to mid lower back, incision site shows no signs of infection. + Bowel sounds X4 tenderness to mid lower quadrant. pt states she loss control of urine last night and was more frequent denies loss of bowels

## 2017-05-12 NOTE — BRIEF OPERATIVE NOTE - PROCEDURE
Vacuum-assisted closure (VAC) of wound of abdomen  05/12/2017    Active  MARYELLEN  Lysis of adhesions  05/12/2017    Active  MARYELLEN  Exploratory laparotomy  05/12/2017    Active  MARYELLEN Small bowel resection  05/12/2017    Active  [email or web address]

## 2017-05-12 NOTE — ED PROVIDER NOTE - CARE PLAN
Principal Discharge DX:	Abdominal pain, unspecified location Principal Discharge DX:	SBO (small bowel obstruction)

## 2017-05-12 NOTE — ED ADULT TRIAGE NOTE - CHIEF COMPLAINT QUOTE
BIBA, patient is awake and oriented times 3, complains of back pain and nausea, s/p back procedure yesterday

## 2017-05-12 NOTE — ED ADULT NURSE NOTE - CHPI ED SYMPTOMS NEG
no motor function loss/no bowel dysfunction/no neck tenderness/no fatigue/no constipation/no anorexia

## 2017-05-12 NOTE — BRIEF OPERATIVE NOTE - OPERATION/FINDINGS
extensive dense adhesions, normal appearing colon extensive dense adhesions, friable point of obstruction in mid SB, c/ spillage of SB contents, normal appearing colon

## 2017-05-12 NOTE — H&P ADULT - NSHPPHYSICALEXAM_GEN_ALL_CORE
OE:  pt alert, Ox3  Chest: CTAB  CVS: S1S2  Abd: soft, tender at RLQ, LLQ, no guarding and rigidity  CNS: intact, power 5/5 over b/l upper extremity, 4/5 on left and 5/5 on the Rt lower extremity  Back: healed scar over the lumbar vertebra of past surgery  tender to palpation OE:  pt alert, Ox3  Chest: CTAB  CVS: S1S2  Abd: soft, obese, mod distended.  Prior ex laps scars from xyphoid to pubis noted, well healed.  No incisional hernias noted. Tender at RLQ immediately below umbilicus, moderately tender LLQ, +vol guarding   CNS: intact, power 5/5 over b/l upper extremity, 4/5 on left and 5/5 on the Rt lower extremity  Back: healed scar over the lumbar vertebra of past surgery  tender to palpation

## 2017-05-12 NOTE — H&P ADULT - PROBLEM SELECTOR PLAN 1
admit under surgery  NPO with IV fluids  pain managment  order lactate and type/screen  EKG and CXR  possible surgery today admit under surgery  NPO with IV fluids  pain management  order lactate and type/screen  EKG and CXR  possible surgery today

## 2017-05-12 NOTE — ED PROVIDER NOTE - PROGRESS NOTE DETAILS
PT NEEDS CT. AWAITING QUANT. I CALLED LAB AND THEY WILL ADD IT AND RUN IT ON THE BLOOD THEY  HAVE THERE CALLED BY DR YO. HIGH GRADE SBO. TWIST OF MESENTARY  SURGERY CALLED IMMEDIATELY TO EVALUATE. EKG CXR IVF, NPO ORDERED

## 2017-05-13 ENCOUNTER — TRANSCRIPTION ENCOUNTER (OUTPATIENT)
Age: 62
End: 2017-05-13

## 2017-05-13 LAB
ALBUMIN SERPL ELPH-MCNC: 2.7 G/DL — LOW (ref 3.3–5.2)
ALP SERPL-CCNC: 134 U/L — HIGH (ref 40–120)
ALT FLD-CCNC: 184 U/L — HIGH
ANION GAP SERPL CALC-SCNC: 13 MMOL/L — SIGNIFICANT CHANGE UP (ref 5–17)
AST SERPL-CCNC: 312 U/L — HIGH
BILIRUB SERPL-MCNC: 1.9 MG/DL — SIGNIFICANT CHANGE UP (ref 0.4–2)
BUN SERPL-MCNC: 21 MG/DL — HIGH (ref 8–20)
CALCIUM SERPL-MCNC: 8.8 MG/DL — SIGNIFICANT CHANGE UP (ref 8.6–10.2)
CHLORIDE SERPL-SCNC: 108 MMOL/L — HIGH (ref 98–107)
CO2 SERPL-SCNC: 20 MMOL/L — LOW (ref 22–29)
CREAT SERPL-MCNC: 0.8 MG/DL — SIGNIFICANT CHANGE UP (ref 0.5–1.3)
GLUCOSE SERPL-MCNC: 128 MG/DL — HIGH (ref 70–115)
HCT VFR BLD CALC: 36.7 % — LOW (ref 37–47)
HGB BLD-MCNC: 11.5 G/DL — LOW (ref 12–16)
MAGNESIUM SERPL-MCNC: 1.4 MG/DL — LOW (ref 1.6–2.6)
MCHC RBC-ENTMCNC: 20.5 PG — LOW (ref 27–31)
MCHC RBC-ENTMCNC: 31.3 G/DL — LOW (ref 32–36)
MCV RBC AUTO: 65.3 FL — LOW (ref 81–99)
PHOSPHATE SERPL-MCNC: 3.4 MG/DL — SIGNIFICANT CHANGE UP (ref 2.4–4.7)
PLATELET # BLD AUTO: 209 K/UL — SIGNIFICANT CHANGE UP (ref 150–400)
POTASSIUM SERPL-MCNC: 3.4 MMOL/L — LOW (ref 3.5–5.3)
POTASSIUM SERPL-SCNC: 3.4 MMOL/L — LOW (ref 3.5–5.3)
PROT SERPL-MCNC: 5.1 G/DL — LOW (ref 6.6–8.7)
RBC # BLD: 5.62 M/UL — HIGH (ref 4.4–5.2)
RBC # FLD: 17 % — HIGH (ref 11–15.6)
SODIUM SERPL-SCNC: 141 MMOL/L — SIGNIFICANT CHANGE UP (ref 135–145)
WBC # BLD: 4.4 K/UL — LOW (ref 4.8–10.8)
WBC # FLD AUTO: 4.4 K/UL — LOW (ref 4.8–10.8)

## 2017-05-13 RX ORDER — HYDROMORPHONE HYDROCHLORIDE 2 MG/ML
2 INJECTION INTRAMUSCULAR; INTRAVENOUS; SUBCUTANEOUS ONCE
Qty: 0 | Refills: 0 | Status: DISCONTINUED | OUTPATIENT
Start: 2017-05-13 | End: 2017-05-13

## 2017-05-13 RX ORDER — POTASSIUM CHLORIDE 20 MEQ
10 PACKET (EA) ORAL
Qty: 0 | Refills: 0 | Status: COMPLETED | OUTPATIENT
Start: 2017-05-13 | End: 2017-05-13

## 2017-05-13 RX ORDER — MAGNESIUM SULFATE 500 MG/ML
2 VIAL (ML) INJECTION ONCE
Qty: 0 | Refills: 0 | Status: COMPLETED | OUTPATIENT
Start: 2017-05-13 | End: 2017-05-13

## 2017-05-13 RX ORDER — HYDROMORPHONE HYDROCHLORIDE 2 MG/ML
1 INJECTION INTRAMUSCULAR; INTRAVENOUS; SUBCUTANEOUS
Qty: 0 | Refills: 0 | Status: DISCONTINUED | OUTPATIENT
Start: 2017-05-13 | End: 2017-05-16

## 2017-05-13 RX ORDER — HYDROMORPHONE HYDROCHLORIDE 2 MG/ML
1 INJECTION INTRAMUSCULAR; INTRAVENOUS; SUBCUTANEOUS ONCE
Qty: 0 | Refills: 0 | Status: DISCONTINUED | OUTPATIENT
Start: 2017-05-13 | End: 2017-05-13

## 2017-05-13 RX ORDER — ACETAMINOPHEN 500 MG
1000 TABLET ORAL ONCE
Qty: 0 | Refills: 0 | Status: COMPLETED | OUTPATIENT
Start: 2017-05-13 | End: 2017-05-13

## 2017-05-13 RX ORDER — POTASSIUM CHLORIDE 20 MEQ
20 PACKET (EA) ORAL
Qty: 0 | Refills: 0 | Status: COMPLETED | OUTPATIENT
Start: 2017-05-13 | End: 2017-05-13

## 2017-05-13 RX ORDER — MAGNESIUM SULFATE 500 MG/ML
2 VIAL (ML) INJECTION EVERY 4 HOURS
Qty: 0 | Refills: 0 | Status: COMPLETED | OUTPATIENT
Start: 2017-05-13 | End: 2017-05-13

## 2017-05-13 RX ADMIN — SODIUM CHLORIDE 125 MILLILITER(S): 9 INJECTION, SOLUTION INTRAVENOUS at 10:54

## 2017-05-13 RX ADMIN — Medication 100 MILLIEQUIVALENT(S): at 21:23

## 2017-05-13 RX ADMIN — HYDROMORPHONE HYDROCHLORIDE 30 MILLILITER(S): 2 INJECTION INTRAMUSCULAR; INTRAVENOUS; SUBCUTANEOUS at 20:09

## 2017-05-13 RX ADMIN — Medication 50 GRAM(S): at 22:09

## 2017-05-13 RX ADMIN — SODIUM CHLORIDE 125 MILLILITER(S): 9 INJECTION, SOLUTION INTRAVENOUS at 19:48

## 2017-05-13 RX ADMIN — Medication 5 MILLIGRAM(S): at 23:30

## 2017-05-13 RX ADMIN — Medication 100 MILLIEQUIVALENT(S): at 19:48

## 2017-05-13 RX ADMIN — HYDROMORPHONE HYDROCHLORIDE 30 MILLILITER(S): 2 INJECTION INTRAMUSCULAR; INTRAVENOUS; SUBCUTANEOUS at 07:43

## 2017-05-13 RX ADMIN — ONDANSETRON 4 MILLIGRAM(S): 8 TABLET, FILM COATED ORAL at 13:05

## 2017-05-13 RX ADMIN — Medication 5 MILLIGRAM(S): at 13:03

## 2017-05-13 RX ADMIN — HYDROMORPHONE HYDROCHLORIDE 2 MILLIGRAM(S): 2 INJECTION INTRAMUSCULAR; INTRAVENOUS; SUBCUTANEOUS at 13:17

## 2017-05-13 RX ADMIN — Medication 50 MILLIEQUIVALENT(S): at 04:33

## 2017-05-13 RX ADMIN — Medication 100 GRAM(S): at 06:14

## 2017-05-13 RX ADMIN — PANTOPRAZOLE SODIUM 40 MILLIGRAM(S): 20 TABLET, DELAYED RELEASE ORAL at 13:03

## 2017-05-13 RX ADMIN — Medication 1000 MILLIGRAM(S): at 13:15

## 2017-05-13 RX ADMIN — Medication 400 MILLIGRAM(S): at 13:00

## 2017-05-13 RX ADMIN — Medication 5 MILLIGRAM(S): at 06:13

## 2017-05-13 RX ADMIN — Medication 5 MILLIGRAM(S): at 18:37

## 2017-05-13 RX ADMIN — HYDROMORPHONE HYDROCHLORIDE 1 MILLIGRAM(S): 2 INJECTION INTRAMUSCULAR; INTRAVENOUS; SUBCUTANEOUS at 20:00

## 2017-05-13 RX ADMIN — HYDROMORPHONE HYDROCHLORIDE 2 MILLIGRAM(S): 2 INJECTION INTRAMUSCULAR; INTRAVENOUS; SUBCUTANEOUS at 13:02

## 2017-05-13 RX ADMIN — HYDROMORPHONE HYDROCHLORIDE 1 MILLIGRAM(S): 2 INJECTION INTRAMUSCULAR; INTRAVENOUS; SUBCUTANEOUS at 19:48

## 2017-05-13 RX ADMIN — Medication 100 MILLIEQUIVALENT(S): at 18:36

## 2017-05-13 RX ADMIN — Medication 100 GRAM(S): at 18:36

## 2017-05-13 RX ADMIN — Medication 50 GRAM(S): at 02:03

## 2017-05-13 RX ADMIN — Medication 50 MILLIEQUIVALENT(S): at 03:12

## 2017-05-13 RX ADMIN — Medication 50 GRAM(S): at 09:34

## 2017-05-13 RX ADMIN — ENOXAPARIN SODIUM 40 MILLIGRAM(S): 100 INJECTION SUBCUTANEOUS at 13:03

## 2017-05-13 NOTE — PROGRESS NOTE ADULT - SUBJECTIVE AND OBJECTIVE BOX
pt seen and examined at bed side  POD 1 for ex lap, HATTIE and small bowel resection  denies N/V/F  NG tube and preston in place  VAC in place    MEDICATIONS  (STANDING):  enoxaparin Injectable 40milliGRAM(s) SubCutaneous daily  pantoprazole  Injectable 40milliGRAM(s) IV Push daily  metoprolol Injectable 5milliGRAM(s) IV Push every 6 hours  lactated ringers. 1000milliLiter(s) IV Continuous <Continuous>  HYDROmorphone PCA (1 mG/mL) 30milliLiter(s) PCA Continuous PCA Continuous  cefoTEtan  IVPB 2Gram(s) IV Intermittent every 12 hours  potassium chloride  10 mEq/100 mL IVPB 10milliEquivalent(s) IV Intermittent every 1 hour  magnesium sulfate  IVPB 2Gram(s) IV Intermittent once    MEDICATIONS  (PRN):  naloxone Injectable 0.1milliGRAM(s) IV Push every 3 minutes PRN For ANY of the following changes in patient status:  A. RR LESS THAN 10 breaths per minute, B. Oxygen saturation LESS THAN 90%, C. Sedation score of 6  ondansetron Injectable 4milliGRAM(s) IV Push every 6 hours PRN Nausea      Vital Signs Last 24 Hrs  T(C): 36.7, Max: 36.9 (05-12 @ 16:04)  T(F): 98, Max: 98.4 (05-12 @ 16:04)  HR: 80 (68 - 86)  BP: 141/82 (116/76 - 227/107)  BP(mean): --  RR: 20 (12 - 20)  SpO2: 98% (98% - 100%)    Constitutional: NAD, well-groomed, well-developed  HEENT: PERRLA, EOMI, no drainage or redness, NG in place  Neck: No bruits; no thyromegaly or nodules,  No JVD  Back: Normal spine flexure, No CVA tenderness, No deformity or limitation of movement  Respiratory: Breath Sounds equal & clear to percussion & auscultation, no accessory muscle use  Cardiovascular: Regular rate & rhythm, normal S1, S2; no murmurs, gallops or rubs; no S3, S4  Gastrointestinal: Soft, tender to palpation over the incision site, VAC in place with proper suction  Extremities: No peripheral edema, No cyanosis, clubbing   Vascular: Equal and normal pulses: 2+ peripheral pulses throughout  Neurological: GCS: 15. A&O x 3; no sensory, motor or coordination deficits, normal reflexes  Psychiatric: Normal mood, normal affect  Musculoskeletal: No joint pain, swelling or deformity; no limitation of movement  Skin: No rashes      I&O's Detail    I & Os for current day (as of 13 May 2017 15:11)  =============================================  IN:    lactated ringers.: 625 ml    Total IN: 625 ml  ---------------------------------------------  OUT:    Nasoenteral Tube: 750 ml    Indwelling Catheter - Urethral: 480 ml    Total OUT: 1230 ml  ---------------------------------------------  Total NET: -605 ml      LABS:                        11.5   4.4   )-----------( 209      ( 12 May 2017 23:54 )             36.7     05-12    141  |  108<H>  |  21.0<H>  ----------------------------<  128<H>  3.4<L>   |  20.0<L>  |  0.80    Ca    8.8      12 May 2017 23:54  Phos  3.4     05-12  Mg     1.4     05-12    TPro  5.1<L>  /  Alb  2.7<L>  /  TBili  1.9  /  DBili  x   /  AST  312<H>  /  ALT  184<H>  /  AlkPhos  134<H>  05-12    PT/INR - ( 12 May 2017 12:10 )   PT: 11.6 sec;   INR: 1.05 ratio         PTT - ( 12 May 2017 12:10 )  PTT:32.9 sec      RADIOLOGY & ADDITIONAL STUDIES:

## 2017-05-13 NOTE — PROGRESS NOTE ADULT - PROBLEM SELECTOR PLAN 1
Keep NPO with iv fluids  iv pain management  keep Mcguire in place  monitor NG output  monitor BF  encourage ambulation

## 2017-05-13 NOTE — PROGRESS NOTE ADULT - PROBLEM SELECTOR PLAN 1
-NPO  -IVF hydration LR @ 125  -pain management w/ dPCA  -dvt ppx w/ lovenox and SCD's  -wound VAC to 125mmHg suction  -protonix  -lopressor 5mg IV q6

## 2017-05-13 NOTE — PROGRESS NOTE ADULT - SUBJECTIVE AND OBJECTIVE BOX
POST OP NOTE: s/p exploratory laparotomy, lysis of adhesions, and small bowel resection    SUBJECTIVE: Patient seen and examined at bedside. No acute events since surgery. Pt tolerated the procedure well and without complication. Pt states her abdominal pain is moderate, but well controlled with dilaudid PCA. Pt with preston catheter and adequate urine output. Continues NPO with IVF hydration, NGT in place, 750cc output. Wound VAC applied and in place with good seal. Pt denies nausea, vomiting, chest pain, or shortness of breath.      MEDICATIONS  (STANDING):  enoxaparin Injectable 40milliGRAM(s) SubCutaneous daily  pantoprazole  Injectable 40milliGRAM(s) IV Push daily  metoprolol Injectable 5milliGRAM(s) IV Push every 6 hours  lactated ringers. 1000milliLiter(s) IV Continuous <Continuous>  HYDROmorphone PCA (1 mG/mL) 30milliLiter(s) PCA Continuous PCA Continuous  cefoTEtan  IVPB 2Gram(s) IV Intermittent every 12 hours  magnesium sulfate  IVPB 2Gram(s) IV Intermittent every 4 hours  potassium chloride  20 mEq/100 mL IVPB 20milliEquivalent(s) IV Intermittent every 2 hours    MEDICATIONS  (PRN):  naloxone Injectable 0.1milliGRAM(s) IV Push every 3 minutes PRN For ANY of the following changes in patient status:  A. RR LESS THAN 10 breaths per minute, B. Oxygen saturation LESS THAN 90%, C. Sedation score of 6  ondansetron Injectable 4milliGRAM(s) IV Push every 6 hours PRN Nausea      Vital Signs Last 24 Hrs  T(C): 36.6, Max: 36.9 (05-12 @ 16:04)  T(F): 97.9, Max: 98.4 (05-12 @ 16:04)  HR: 74 (68 - 86)  BP: 139/79 (138/70 - 227/107)  BP(mean): --  RR: 16 (12 - 18)  SpO2: 100% (98% - 100%)    PE  Gen: NAD, AAOx3  Pulm: CTAB  CV: RRR, normal intensity s1/s2  Abd: soft, diffuse tenderness, nondistended, midline woundvac in place with good seal  : preston in place  Ext: moving all extremities  Neuro: GCS 15      I&O's Detail    I & Os for current day (as of 13 May 2017 04:14)  =============================================  IN:    lactated ringers.: 250 ml    Total IN: 250 ml  ---------------------------------------------  OUT:    Nasoenteral Tube: 750 ml    Indwelling Catheter - Urethral: 330 ml    Total OUT: 1080 ml  ---------------------------------------------  Total NET: -830 ml      LABS:                        11.5   4.4   )-----------( 209      ( 12 May 2017 23:54 )             36.7     05-12    141  |  108<H>  |  21.0<H>  ----------------------------<  128<H>  3.4<L>   |  20.0<L>  |  0.80    Ca    8.8      12 May 2017 23:54  Phos  3.4     05-12  Mg     1.4     05-12    TPro  5.1<L>  /  Alb  2.7<L>  /  TBili  1.9  /  DBili  x   /  AST  312<H>  /  ALT  184<H>  /  AlkPhos  134<H>  05-12    PT/INR - ( 12 May 2017 12:10 )   PT: 11.6 sec;   INR: 1.05 ratio         PTT - ( 12 May 2017 12:10 )  PTT:32.9 sec

## 2017-05-14 LAB
ANION GAP SERPL CALC-SCNC: 10 MMOL/L — SIGNIFICANT CHANGE UP (ref 5–17)
ANION GAP SERPL CALC-SCNC: 12 MMOL/L — SIGNIFICANT CHANGE UP (ref 5–17)
BUN SERPL-MCNC: 38 MG/DL — HIGH (ref 8–20)
BUN SERPL-MCNC: 41 MG/DL — HIGH (ref 8–20)
CALCIUM SERPL-MCNC: 7.9 MG/DL — LOW (ref 8.6–10.2)
CALCIUM SERPL-MCNC: 7.9 MG/DL — LOW (ref 8.6–10.2)
CHLORIDE SERPL-SCNC: 106 MMOL/L — SIGNIFICANT CHANGE UP (ref 98–107)
CHLORIDE SERPL-SCNC: 107 MMOL/L — SIGNIFICANT CHANGE UP (ref 98–107)
CO2 SERPL-SCNC: 20 MMOL/L — LOW (ref 22–29)
CO2 SERPL-SCNC: 21 MMOL/L — LOW (ref 22–29)
CREAT SERPL-MCNC: 0.89 MG/DL — SIGNIFICANT CHANGE UP (ref 0.5–1.3)
CREAT SERPL-MCNC: 1.25 MG/DL — SIGNIFICANT CHANGE UP (ref 0.5–1.3)
GLUCOSE SERPL-MCNC: 63 MG/DL — LOW (ref 70–115)
GLUCOSE SERPL-MCNC: 81 MG/DL — SIGNIFICANT CHANGE UP (ref 70–115)
HCT VFR BLD CALC: 28.8 % — LOW (ref 37–47)
HGB BLD-MCNC: 9 G/DL — LOW (ref 12–16)
MAGNESIUM SERPL-MCNC: 3.2 MG/DL — HIGH (ref 1.6–2.6)
MCHC RBC-ENTMCNC: 20.3 PG — LOW (ref 27–31)
MCHC RBC-ENTMCNC: 31.3 G/DL — LOW (ref 32–36)
MCV RBC AUTO: 65 FL — LOW (ref 81–99)
PHOSPHATE SERPL-MCNC: 4.6 MG/DL — SIGNIFICANT CHANGE UP (ref 2.4–4.7)
PLATELET # BLD AUTO: 172 K/UL — SIGNIFICANT CHANGE UP (ref 150–400)
POTASSIUM SERPL-MCNC: 4.5 MMOL/L — SIGNIFICANT CHANGE UP (ref 3.5–5.3)
POTASSIUM SERPL-MCNC: 5 MMOL/L — SIGNIFICANT CHANGE UP (ref 3.5–5.3)
POTASSIUM SERPL-SCNC: 4.5 MMOL/L — SIGNIFICANT CHANGE UP (ref 3.5–5.3)
POTASSIUM SERPL-SCNC: 5 MMOL/L — SIGNIFICANT CHANGE UP (ref 3.5–5.3)
RBC # BLD: 4.43 M/UL — SIGNIFICANT CHANGE UP (ref 4.4–5.2)
RBC # FLD: 17.5 % — HIGH (ref 11–15.6)
SODIUM SERPL-SCNC: 138 MMOL/L — SIGNIFICANT CHANGE UP (ref 135–145)
SODIUM SERPL-SCNC: 138 MMOL/L — SIGNIFICANT CHANGE UP (ref 135–145)
WBC # BLD: 12.4 K/UL — HIGH (ref 4.8–10.8)
WBC # FLD AUTO: 12.4 K/UL — HIGH (ref 4.8–10.8)

## 2017-05-14 RX ORDER — DEXTROSE 50 % IN WATER 50 %
12.5 SYRINGE (ML) INTRAVENOUS ONCE
Qty: 0 | Refills: 0 | Status: DISCONTINUED | OUTPATIENT
Start: 2017-05-14 | End: 2017-05-23

## 2017-05-14 RX ORDER — SODIUM CHLORIDE 9 MG/ML
500 INJECTION INTRAMUSCULAR; INTRAVENOUS; SUBCUTANEOUS ONCE
Qty: 0 | Refills: 0 | Status: COMPLETED | OUTPATIENT
Start: 2017-05-14 | End: 2017-05-14

## 2017-05-14 RX ORDER — DEXTROSE 50 % IN WATER 50 %
12.5 SYRINGE (ML) INTRAVENOUS ONCE
Qty: 0 | Refills: 0 | Status: COMPLETED | OUTPATIENT
Start: 2017-05-14 | End: 2017-05-14

## 2017-05-14 RX ADMIN — Medication 12.5 GRAM(S): at 19:47

## 2017-05-14 RX ADMIN — HYDROMORPHONE HYDROCHLORIDE 1 MILLIGRAM(S): 2 INJECTION INTRAMUSCULAR; INTRAVENOUS; SUBCUTANEOUS at 02:30

## 2017-05-14 RX ADMIN — Medication 5 MILLIGRAM(S): at 17:21

## 2017-05-14 RX ADMIN — SODIUM CHLORIDE 500 MILLILITER(S): 9 INJECTION INTRAMUSCULAR; INTRAVENOUS; SUBCUTANEOUS at 13:17

## 2017-05-14 RX ADMIN — SODIUM CHLORIDE 125 MILLILITER(S): 9 INJECTION, SOLUTION INTRAVENOUS at 21:09

## 2017-05-14 RX ADMIN — ENOXAPARIN SODIUM 40 MILLIGRAM(S): 100 INJECTION SUBCUTANEOUS at 13:15

## 2017-05-14 RX ADMIN — HYDROMORPHONE HYDROCHLORIDE 30 MILLILITER(S): 2 INJECTION INTRAMUSCULAR; INTRAVENOUS; SUBCUTANEOUS at 19:11

## 2017-05-14 RX ADMIN — Medication 5 MILLIGRAM(S): at 06:43

## 2017-05-14 RX ADMIN — Medication 5 MILLIGRAM(S): at 13:17

## 2017-05-14 RX ADMIN — Medication 5 MILLIGRAM(S): at 23:43

## 2017-05-14 RX ADMIN — HYDROMORPHONE HYDROCHLORIDE 30 MILLILITER(S): 2 INJECTION INTRAMUSCULAR; INTRAVENOUS; SUBCUTANEOUS at 07:43

## 2017-05-14 RX ADMIN — PANTOPRAZOLE SODIUM 40 MILLIGRAM(S): 20 TABLET, DELAYED RELEASE ORAL at 13:16

## 2017-05-14 NOTE — PROGRESS NOTE ADULT - SUBJECTIVE AND OBJECTIVE BOX
HPI/OVERNIGHT EVENTS: Patient seen and examined at bedside. She states that she feels "ok" at this time, reporting that PCA is giving better pain control than her regimen was yesterday. She denies passing any flatus or BMs yet. She expresses desire to have her NGT removed. Explained to patient the need to trend output of NGT before removing. She states she has not yet gotten OOB to ambulate. Mcguire remains in place. She denies fever/chills, CP/SOB.     MEDICATIONS  (STANDING):  enoxaparin Injectable 40milliGRAM(s) SubCutaneous daily  pantoprazole  Injectable 40milliGRAM(s) IV Push daily  metoprolol Injectable 5milliGRAM(s) IV Push every 6 hours  lactated ringers. 1000milliLiter(s) IV Continuous <Continuous>  HYDROmorphone PCA (1 mG/mL) 30milliLiter(s) PCA Continuous PCA Continuous    MEDICATIONS  (PRN):  naloxone Injectable 0.1milliGRAM(s) IV Push every 3 minutes PRN For ANY of the following changes in patient status:  A. RR LESS THAN 10 breaths per minute, B. Oxygen saturation LESS THAN 90%, C. Sedation score of 6  ondansetron Injectable 4milliGRAM(s) IV Push every 6 hours PRN Nausea  HYDROmorphone PCA (1 mG/mL) Rescue Clinician Bolus 1milliGRAM(s) IV Push every 3 hours PRN Severe breakthrough pain      Vital Signs Last 24 Hrs  T(C): 36.4, Max: 37.2 (05-13 @ 23:24)  T(F): 97.6, Max: 98.9 (05-13 @ 23:24)  HR: 85 (75 - 90)  BP: 141/75 (109/64 - 142/70)  BP(mean): --  RR: 20 (16 - 20)  SpO2: 98% (96% - 100%)    Constitutional: patient resting in bed, non-toxic appearing, in no acute distress  HEENT: NGT in place to suction, brown output in canister, EOMI / PERRL b/l, no active drainage or redness  Neck: No JVD, full ROM without pain  Respiratory: Breath Sounds CTA b/l, no accessory muscle use, no conversational dyspnea  Cardiovascular: Regular rate & rhythm, +S1, S2  Gastrointestinal: Abdomen soft and non-distended, mild tenderness to palpation, no rebound tenderness / guarding, midline vac in place with good seal, no surrounding erythema or active drainage.   Extremities: No peripheral edema of lower extremities b/l, no cyanosis, clubbing   Vascular: 2+ peripheral pulses throughout  Neurological: GCS: 15 (4/5/6). A&O x 3; no gross sensory / motor / coordination deficits  Psychiatric: Normal mood, normal affect  Musculoskeletal: No joint pain, swelling or deformity; no limitation of movement  Skin: Warm & dry, no rashes      I&O's Detail    I & Os for current day (as of 14 May 2017 09:12)  =============================================  IN:    lactated ringers.: 2500 ml    Total IN: 2500 ml  ---------------------------------------------  OUT:    Indwelling Catheter - Urethral: 450 ml    Nasoenteral Tube: 400 ml    Total OUT: 850 ml  ---------------------------------------------  Total NET: 1650 ml      LABS:                        9.0    12.4  )-----------( x        ( 14 May 2017 06:38 )             28.8     05-14    138  |  107  |  41.0<H>  ----------------------------<  81  5.0   |  21.0<L>  |  1.25    Ca    7.9<L>      14 May 2017 06:38  Phos  4.6     05-14  Mg     3.2     05-14    TPro  5.1<L>  /  Alb  2.7<L>  /  TBili  1.9  /  DBili  x   /  AST  312<H>  /  ALT  184<H>  /  AlkPhos  134<H>  05-12    PT/INR - ( 12 May 2017 12:10 )   PT: 11.6 sec;   INR: 1.05 ratio         PTT - ( 12 May 2017 12:10 )  PTT:32.9 sec

## 2017-05-14 NOTE — PROGRESS NOTE ADULT - PROBLEM SELECTOR PLAN 1
- Continue to trend NGT outputs - consider clamp trial when outputs start to decrease  - Keep NPO for now with LR @ 125  - Serial abd exams  - Monitor for bowel fxn  - Vac to be changed by ACS team tomorrow 5/15  - Pain control with PCA as rx'ed  - Encourage pt to ambulate as tolerated  - DVT ppx with lovenox  - Encourage incentive rose use for pulm toileting

## 2017-05-15 DIAGNOSIS — M54.42 LUMBAGO WITH SCIATICA, LEFT SIDE: ICD-10-CM

## 2017-05-15 LAB
ANION GAP SERPL CALC-SCNC: 13 MMOL/L — SIGNIFICANT CHANGE UP (ref 5–17)
ANISOCYTOSIS BLD QL: SLIGHT — SIGNIFICANT CHANGE UP
BUN SERPL-MCNC: 31 MG/DL — HIGH (ref 8–20)
CALCIUM SERPL-MCNC: 8.1 MG/DL — LOW (ref 8.6–10.2)
CHLORIDE SERPL-SCNC: 107 MMOL/L — SIGNIFICANT CHANGE UP (ref 98–107)
CO2 SERPL-SCNC: 20 MMOL/L — LOW (ref 22–29)
CREAT SERPL-MCNC: 0.62 MG/DL — SIGNIFICANT CHANGE UP (ref 0.5–1.3)
DACRYOCYTES BLD QL SMEAR: SLIGHT — SIGNIFICANT CHANGE UP
ELLIPTOCYTES BLD QL SMEAR: SLIGHT — SIGNIFICANT CHANGE UP
EOSINOPHIL # BLD AUTO: 0 K/UL — SIGNIFICANT CHANGE UP (ref 0–0.5)
EOSINOPHIL NFR BLD AUTO: 0.5 % — SIGNIFICANT CHANGE UP (ref 0–6)
GLUCOSE SERPL-MCNC: 71 MG/DL — SIGNIFICANT CHANGE UP (ref 70–115)
HCT VFR BLD CALC: 26.2 % — LOW (ref 37–47)
HGB BLD-MCNC: 8.4 G/DL — LOW (ref 12–16)
HYPOCHROMIA BLD QL: SLIGHT — SIGNIFICANT CHANGE UP
LYMPHOCYTES # BLD AUTO: 0.8 K/UL — LOW (ref 1–4.8)
LYMPHOCYTES # BLD AUTO: 7.7 % — LOW (ref 20–55)
MACROCYTES BLD QL: SLIGHT — SIGNIFICANT CHANGE UP
MAGNESIUM SERPL-MCNC: 2.6 MG/DL — SIGNIFICANT CHANGE UP (ref 1.6–2.6)
MCHC RBC-ENTMCNC: 20.4 PG — LOW (ref 27–31)
MCHC RBC-ENTMCNC: 32.1 G/DL — SIGNIFICANT CHANGE UP (ref 32–36)
MCV RBC AUTO: 63.6 FL — LOW (ref 81–99)
MICROCYTES BLD QL: SLIGHT — SIGNIFICANT CHANGE UP
MONOCYTES # BLD AUTO: 0.6 K/UL — SIGNIFICANT CHANGE UP (ref 0–0.8)
MONOCYTES NFR BLD AUTO: 5.4 % — SIGNIFICANT CHANGE UP (ref 3–10)
NEUTROPHILS # BLD AUTO: 9.1 K/UL — HIGH (ref 1.8–8)
NEUTROPHILS NFR BLD AUTO: 85.8 % — HIGH (ref 37–73)
OVALOCYTES BLD QL SMEAR: SLIGHT — SIGNIFICANT CHANGE UP
PHOSPHATE SERPL-MCNC: 2.5 MG/DL — SIGNIFICANT CHANGE UP (ref 2.4–4.7)
PLAT MORPH BLD: NORMAL — SIGNIFICANT CHANGE UP
PLATELET # BLD AUTO: 156 K/UL — SIGNIFICANT CHANGE UP (ref 150–400)
POIKILOCYTOSIS BLD QL AUTO: SLIGHT — SIGNIFICANT CHANGE UP
POTASSIUM SERPL-MCNC: 4 MMOL/L — SIGNIFICANT CHANGE UP (ref 3.5–5.3)
POTASSIUM SERPL-SCNC: 4 MMOL/L — SIGNIFICANT CHANGE UP (ref 3.5–5.3)
RBC # BLD: 4.12 M/UL — LOW (ref 4.4–5.2)
RBC # FLD: 17.1 % — HIGH (ref 11–15.6)
RBC BLD AUTO: ABNORMAL
SODIUM SERPL-SCNC: 140 MMOL/L — SIGNIFICANT CHANGE UP (ref 135–145)
WBC # BLD: 10.6 K/UL — SIGNIFICANT CHANGE UP (ref 4.8–10.8)
WBC # FLD AUTO: 10.6 K/UL — SIGNIFICANT CHANGE UP (ref 4.8–10.8)

## 2017-05-15 RX ORDER — HYDRALAZINE HCL 50 MG
10 TABLET ORAL ONCE
Qty: 0 | Refills: 0 | Status: COMPLETED | OUTPATIENT
Start: 2017-05-15 | End: 2017-05-15

## 2017-05-15 RX ORDER — NIFEDIPINE 30 MG
90 TABLET, EXTENDED RELEASE 24 HR ORAL DAILY
Qty: 0 | Refills: 0 | Status: DISCONTINUED | OUTPATIENT
Start: 2017-05-15 | End: 2017-05-23

## 2017-05-15 RX ORDER — CARVEDILOL PHOSPHATE 80 MG/1
12.5 CAPSULE, EXTENDED RELEASE ORAL EVERY 12 HOURS
Qty: 0 | Refills: 0 | Status: DISCONTINUED | OUTPATIENT
Start: 2017-05-15 | End: 2017-05-23

## 2017-05-15 RX ORDER — ACETAMINOPHEN 500 MG
1000 TABLET ORAL ONCE
Qty: 0 | Refills: 0 | Status: DISCONTINUED | OUTPATIENT
Start: 2017-05-15 | End: 2017-05-15

## 2017-05-15 RX ORDER — ACETAMINOPHEN 500 MG
1000 TABLET ORAL ONCE
Qty: 0 | Refills: 0 | Status: COMPLETED | OUTPATIENT
Start: 2017-05-15 | End: 2017-05-15

## 2017-05-15 RX ADMIN — Medication 5 MILLIGRAM(S): at 11:49

## 2017-05-15 RX ADMIN — HYDROMORPHONE HYDROCHLORIDE 30 MILLILITER(S): 2 INJECTION INTRAMUSCULAR; INTRAVENOUS; SUBCUTANEOUS at 19:13

## 2017-05-15 RX ADMIN — Medication 10 MILLIGRAM(S): at 05:23

## 2017-05-15 RX ADMIN — ENOXAPARIN SODIUM 40 MILLIGRAM(S): 100 INJECTION SUBCUTANEOUS at 11:48

## 2017-05-15 RX ADMIN — PANTOPRAZOLE SODIUM 40 MILLIGRAM(S): 20 TABLET, DELAYED RELEASE ORAL at 11:48

## 2017-05-15 RX ADMIN — Medication 1000 MILLIGRAM(S): at 16:31

## 2017-05-15 RX ADMIN — Medication 10 MILLIGRAM(S): at 17:03

## 2017-05-15 RX ADMIN — Medication 10 MILLIGRAM(S): at 01:29

## 2017-05-15 RX ADMIN — HYDROMORPHONE HYDROCHLORIDE 30 MILLILITER(S): 2 INJECTION INTRAMUSCULAR; INTRAVENOUS; SUBCUTANEOUS at 17:49

## 2017-05-15 RX ADMIN — Medication 400 MILLIGRAM(S): at 16:01

## 2017-05-15 RX ADMIN — Medication 90 MILLIGRAM(S): at 17:03

## 2017-05-15 RX ADMIN — Medication 5 MILLIGRAM(S): at 06:36

## 2017-05-15 RX ADMIN — CARVEDILOL PHOSPHATE 12.5 MILLIGRAM(S): 80 CAPSULE, EXTENDED RELEASE ORAL at 17:03

## 2017-05-15 NOTE — PROGRESS NOTE ADULT - PROBLEM SELECTOR PLAN 1
- Continue to trend NGT outputs - consider clamp trial today  - Keep NPO for now with LR @ 125  - Serial abd exams  - Monitor for bowel fxn  - Vac to be changed by ACS team today   - Pain control with PCA as rx'ed  - Encourage pt to ambulate as tolerated  - DVT ppx with lovenox  - Encourage incentive rose use for pulm toileting

## 2017-05-15 NOTE — PROGRESS NOTE ADULT - SUBJECTIVE AND OBJECTIVE BOX
HPI/OVERNIGHT EVENTS: Patient seen and examined at bedside, states that her pain is not well controlled, instructed patient multiple times on how to use PCA button. She denies passing any flatus or BMs yet. Patient complains of back pain, she states she has not yet gotten OOB to ambulate. Mcguire remains in place. She denies fever/chills, CP/SOB.    MEDICATIONS  (STANDING):  enoxaparin Injectable 40milliGRAM(s) SubCutaneous daily  pantoprazole  Injectable 40milliGRAM(s) IV Push daily  metoprolol Injectable 5milliGRAM(s) IV Push every 6 hours  lactated ringers. 1000milliLiter(s) IV Continuous <Continuous>  HYDROmorphone PCA (1 mG/mL) 30milliLiter(s) PCA Continuous PCA Continuous    MEDICATIONS  (PRN):  naloxone Injectable 0.1milliGRAM(s) IV Push every 3 minutes PRN For ANY of the following changes in patient status:  A. RR LESS THAN 10 breaths per minute, B. Oxygen saturation LESS THAN 90%, C. Sedation score of 6  ondansetron Injectable 4milliGRAM(s) IV Push every 6 hours PRN Nausea  HYDROmorphone PCA (1 mG/mL) Rescue Clinician Bolus 1milliGRAM(s) IV Push every 3 hours PRN Severe breakthrough pain        Constitutional: patient resting in bed, non-toxic appearing, in no acute distress  HEENT: NGT in place to suction, green output in canister, EOMI.   Respiratory: Breath Sounds CTA b/l, no accessory muscle use, no conversational dyspnea. Using nasal cannula, 2% nasal cannula.   Cardiovascular: Regular rate & rhythm, +S1, S2  Gastrointestinal: Abdomen soft and non-distended, mild tenderness to palpation, no rebound tenderness / guarding, midline vac in place with good seal, no surrounding erythema or active drainage.   Extremities: No peripheral edema of lower extremities b/l  Neurological: A&O to person and time   Skin: Warm & dry, no rashes    05-15    140  |  107  |  31.0<H>  ----------------------------<  71  4.0   |  20.0<L>  |  0.62    Ca    8.1<L>      15 May 2017 06:11  Phos  2.5     05-15  Mg     2.6     05-15                            8.4    10.6  )-----------( 156      ( 15 May 2017 06:11 )             26.2

## 2017-05-15 NOTE — PHYSICAL THERAPY INITIAL EVALUATION ADULT - GAIT DEVIATIONS NOTED, PT EVAL
decreased weight-shifting ability/decreased step length/decreased angle/increased time in double stance

## 2017-05-15 NOTE — PHYSICAL THERAPY INITIAL EVALUATION ADULT - RANGE OF MOTION EXAMINATION, REHAB EVAL
deficits as listed below/bilateral lower extremity ROM was WFL (within functional limits)/difficulty moving the left LE/bilateral upper extremity ROM was WFL (within functional limits)

## 2017-05-15 NOTE — PROGRESS NOTE ADULT - SUBJECTIVE AND OBJECTIVE BOX
Chief Complaint: Incisional Pain    Patient seen and examined at bedside  PCA - Dilaudid  Pain well controlled with current medication regimen. Patient complaining of sore throat, and dry mouth and requesting water or ice chips. Patient complaining of generalized body weakness.  No adverse side effects   No new events overnight  NPO/NGT    PAST MEDICAL & SURGICAL HISTORY:  Varicose veins: left lower extremity  Pacemaker: reports shut off  Urinary incontinence, unspecified type  Low back pain with left-sided sciatica, unspecified back pain laterality, unspecified chronicity  Malignant neoplasm of ovary, unspecified laterality  Stented coronary artery  CAD (coronary artery disease)  Thalassemia, unspecified type  CVA (cerebral vascular accident)  HTN (hypertension)  S/P spinal surgery: S/P spinal cord stimulator placed.  Artificial cardiac pacemaker: Reports &quot;shut off.&quot;  H/O abdominal hysterectomy  H/O laminectomy      SOCIAL HISTORY:  [ ] Denies Smoking, Alcohol, or Drug Use    Allergies    No Known Drug Allergies  peanuts (Hives)    Intolerances        PAIN MEDICATIONS:  HYDROmorphone PCA (1 mG/mL) 30milliLiter(s) PCA Continuous PCA Continuous  ondansetron Injectable 4milliGRAM(s) IV Push every 6 hours PRN  HYDROmorphone PCA (1 mG/mL) Rescue Clinician Bolus 1milliGRAM(s) IV Push every 3 hours PRN    Heme:  enoxaparin Injectable 40milliGRAM(s) SubCutaneous daily    Antibiotics:    Cardiac:  metoprolol Injectable 5milliGRAM(s) IV Push every 6 hours    Pulmonary:    Endocrine  dextrose 50% Injectable 12.5Gram(s) IV Push once    GI:  pantoprazole  Injectable 40milliGRAM(s) IV Push daily  bisacodyl Suppository 10milliGRAM(s) Rectal daily    All Other Meds:  lactated ringers. 1000milliLiter(s) IV Continuous <Continuous>  naloxone Injectable 0.1milliGRAM(s) IV Push every 3 minutes PRN      REVIEW OF SYSTEMS:  CONSTITUTIONAL: Negative fever,chills  NECK: No pain or stiffness  RESPIRATORY: No cough, wheezing,  No shortness of breath  GASTROINTESTINAL: No abdominal, No nausea, vomiting; No diarrhea or constipation.   SKIN: No itching, burning, rashes, or lesions   MUSCULOSKELETAL: No joint pain or swelling; No muscle, back, or extremity pain    PHYSICAL EXAM:    Vital Signs Last 24 Hrs  T(C): 37.2, Max: 37.9 (05-14 @ 20:40)  T(F): 98.9, Max: 100.3 (05-14 @ 20:40)  HR: 101 (82 - 101)  BP: 150/69 (134/70 - 189/84)  BP(mean): --  RR: 18 (17 - 20)  SpO2: 99% (96% - 99%)    PAIN SCALE:  3-4/10  VNRS (Verbal Numerical Rating Scale) 1-10       GENERAL: Comfortable, in no apparent distress  HEENT:  Atraumatic, Normocephalic  NECK: Supple  LUNG: Respiration even and unlabored, no distress noted  ABDOMEN: Generalized abdominal tenderness;  Dressing C/D/I  BACK: Positive midline bony tenderness to palpation, no step off or deformity noted.; healed lumbar spine scar noted   EXTREMITIES:  MAR X4, No clubbing  NEUROLOGIC: Awake, alert and oriented X3  SKIN: Warm and Dry    LABS:                          8.4    10.6  )-----------( 156      ( 15 May 2017 06:11 )             26.2     05-15    140  |  107  |  31.0<H>  ----------------------------<  71  4.0   |  20.0<L>  |  0.62    Ca    8.1<L>      15 May 2017 06:11  Phos  2.5     05-15  Mg     2.6     05-15            Drug Screen:        RADIOLOGY:      [x ]  [place]  Reviewed and Copied to Chart Chief Complaint: Incisional Pain    Patient seen and examined at bedside  PCA - Dilaudid  Pain well controlled with current medication regimen. Patient complaining of sore throat, and dry mouth and requesting water or ice chips. Patient complaining of generalized body weakness.  No adverse side effects   No new events overnight  NPO/NGT    PAST MEDICAL & SURGICAL HISTORY:  Varicose veins: left lower extremity  Pacemaker: reports shut off  Urinary incontinence, unspecified type  Low back pain with left-sided sciatica, unspecified back pain laterality, unspecified chronicity  Malignant neoplasm of ovary, unspecified laterality  Stented coronary artery  CAD (coronary artery disease)  Thalassemia, unspecified type  CVA (cerebral vascular accident)  HTN (hypertension)  S/P spinal surgery: S/P spinal cord stimulator placed.  Artificial cardiac pacemaker: Reports &quot;shut off.&quot;  H/O abdominal hysterectomy  H/O laminectomy      SOCIAL HISTORY:  [ ] Denies Smoking, Alcohol, or Drug Use    Allergies    No Known Drug Allergies  peanuts (Hives)    Intolerances        PAIN MEDICATIONS:  HYDROmorphone PCA (1 mG/mL) 30milliLiter(s) PCA Continuous PCA Continuous  ondansetron Injectable 4milliGRAM(s) IV Push every 6 hours PRN  HYDROmorphone PCA (1 mG/mL) Rescue Clinician Bolus 1milliGRAM(s) IV Push every 3 hours PRN    Heme:  enoxaparin Injectable 40milliGRAM(s) SubCutaneous daily    Antibiotics:    Cardiac:  metoprolol Injectable 5milliGRAM(s) IV Push every 6 hours    Pulmonary:    Endocrine  dextrose 50% Injectable 12.5Gram(s) IV Push once    GI:  pantoprazole  Injectable 40milliGRAM(s) IV Push daily  bisacodyl Suppository 10milliGRAM(s) Rectal daily    All Other Meds:  lactated ringers. 1000milliLiter(s) IV Continuous <Continuous>  naloxone Injectable 0.1milliGRAM(s) IV Push every 3 minutes PRN      REVIEW OF SYSTEMS:  CONSTITUTIONAL: Negative fever,chills  NECK: No pain or stiffness  RESPIRATORY: No cough, wheezing,  No shortness of breath  GASTROINTESTINAL: + incisional abdominal pain; No nausea, vomiting; No diarrhea or constipation.   SKIN: No itching, burning, rashes, or lesions   MUSCULOSKELETAL: No joint pain or swelling; + muscle, back, or extremity pain    PHYSICAL EXAM:    Vital Signs Last 24 Hrs  T(C): 37.2, Max: 37.9 (05-14 @ 20:40)  T(F): 98.9, Max: 100.3 (05-14 @ 20:40)  HR: 101 (82 - 101)  BP: 150/69 (134/70 - 189/84)  BP(mean): --  RR: 18 (17 - 20)  SpO2: 99% (96% - 99%)    PAIN SCALE:  3-4/10  VNRS (Verbal Numerical Rating Scale) 1-10       GENERAL: Comfortable, in no apparent distress  HEENT:  Atraumatic, Normocephalic  NECK: Supple  LUNG: Respiration even and unlabored, no distress noted  ABDOMEN: Generalized abdominal tenderness;  Dressing C/D/I  BACK: Positive midline bony tenderness to palpation, no step off or deformity noted.; healed lumbar spine scar noted   EXTREMITIES:  MAR X4, No clubbing  NEUROLOGIC: Awake, alert and oriented X3  SKIN: Warm and Dry    LABS:                          8.4    10.6  )-----------( 156      ( 15 May 2017 06:11 )             26.2     05-15    140  |  107  |  31.0<H>  ----------------------------<  71  4.0   |  20.0<L>  |  0.62    Ca    8.1<L>      15 May 2017 06:11  Phos  2.5     05-15  Mg     2.6     05-15            Drug Screen:        RADIOLOGY:      [x ]  [place]  Reviewed and Copied to Chart

## 2017-05-15 NOTE — PROGRESS NOTE ADULT - PROBLEM SELECTOR PLAN 1
1-Pain is stable, pain is controlled  2-Meds:  Continue Dilaudid PCA until diet is advanced  -Add: Ofirmev 1000mg x 1 dose  3-Patient agrees with above plan, will continue to follow

## 2017-05-16 LAB
ANION GAP SERPL CALC-SCNC: 17 MMOL/L — SIGNIFICANT CHANGE UP (ref 5–17)
BUN SERPL-MCNC: 25 MG/DL — HIGH (ref 8–20)
CALCIUM SERPL-MCNC: 8.6 MG/DL — SIGNIFICANT CHANGE UP (ref 8.6–10.2)
CHLORIDE SERPL-SCNC: 103 MMOL/L — SIGNIFICANT CHANGE UP (ref 98–107)
CO2 SERPL-SCNC: 19 MMOL/L — LOW (ref 22–29)
CREAT SERPL-MCNC: 0.43 MG/DL — LOW (ref 0.5–1.3)
GLUCOSE SERPL-MCNC: 72 MG/DL — SIGNIFICANT CHANGE UP (ref 70–115)
HCT VFR BLD CALC: 26.6 % — LOW (ref 37–47)
HGB BLD-MCNC: 8.6 G/DL — LOW (ref 12–16)
MAGNESIUM SERPL-MCNC: 2.1 MG/DL — SIGNIFICANT CHANGE UP (ref 1.6–2.6)
MCHC RBC-ENTMCNC: 20.5 PG — LOW (ref 27–31)
MCHC RBC-ENTMCNC: 32.3 G/DL — SIGNIFICANT CHANGE UP (ref 32–36)
MCV RBC AUTO: 63.3 FL — LOW (ref 81–99)
PHOSPHATE SERPL-MCNC: 2.3 MG/DL — LOW (ref 2.4–4.7)
PLATELET # BLD AUTO: 177 K/UL — SIGNIFICANT CHANGE UP (ref 150–400)
POTASSIUM SERPL-MCNC: 3.8 MMOL/L — SIGNIFICANT CHANGE UP (ref 3.5–5.3)
POTASSIUM SERPL-SCNC: 3.8 MMOL/L — SIGNIFICANT CHANGE UP (ref 3.5–5.3)
RBC # BLD: 4.2 M/UL — LOW (ref 4.4–5.2)
RBC # FLD: 17.2 % — HIGH (ref 11–15.6)
SODIUM SERPL-SCNC: 139 MMOL/L — SIGNIFICANT CHANGE UP (ref 135–145)
WBC # BLD: 9.4 K/UL — SIGNIFICANT CHANGE UP (ref 4.8–10.8)
WBC # FLD AUTO: 9.4 K/UL — SIGNIFICANT CHANGE UP (ref 4.8–10.8)

## 2017-05-16 RX ORDER — OXYCODONE HYDROCHLORIDE 5 MG/1
10 TABLET ORAL EVERY 4 HOURS
Qty: 0 | Refills: 0 | Status: DISCONTINUED | OUTPATIENT
Start: 2017-05-16 | End: 2017-05-21

## 2017-05-16 RX ORDER — ACETAMINOPHEN 500 MG
650 TABLET ORAL EVERY 6 HOURS
Qty: 0 | Refills: 0 | Status: DISCONTINUED | OUTPATIENT
Start: 2017-05-16 | End: 2017-05-23

## 2017-05-16 RX ORDER — OXYCODONE HYDROCHLORIDE 5 MG/1
5 TABLET ORAL EVERY 4 HOURS
Qty: 0 | Refills: 0 | Status: DISCONTINUED | OUTPATIENT
Start: 2017-05-16 | End: 2017-05-21

## 2017-05-16 RX ORDER — DOCUSATE SODIUM 100 MG
100 CAPSULE ORAL THREE TIMES A DAY
Qty: 0 | Refills: 0 | Status: DISCONTINUED | OUTPATIENT
Start: 2017-05-16 | End: 2017-05-23

## 2017-05-16 RX ORDER — SENNA PLUS 8.6 MG/1
2 TABLET ORAL AT BEDTIME
Qty: 0 | Refills: 0 | Status: DISCONTINUED | OUTPATIENT
Start: 2017-05-16 | End: 2017-05-23

## 2017-05-16 RX ADMIN — HYDROMORPHONE HYDROCHLORIDE 30 MILLILITER(S): 2 INJECTION INTRAMUSCULAR; INTRAVENOUS; SUBCUTANEOUS at 07:32

## 2017-05-16 RX ADMIN — Medication 90 MILLIGRAM(S): at 06:01

## 2017-05-16 RX ADMIN — PANTOPRAZOLE SODIUM 40 MILLIGRAM(S): 20 TABLET, DELAYED RELEASE ORAL at 11:42

## 2017-05-16 RX ADMIN — Medication 10 MILLIGRAM(S): at 13:57

## 2017-05-16 RX ADMIN — Medication 63.75 MILLIMOLE(S): at 13:54

## 2017-05-16 RX ADMIN — ENOXAPARIN SODIUM 40 MILLIGRAM(S): 100 INJECTION SUBCUTANEOUS at 11:42

## 2017-05-16 RX ADMIN — Medication 100 MILLIGRAM(S): at 21:25

## 2017-05-16 RX ADMIN — CARVEDILOL PHOSPHATE 12.5 MILLIGRAM(S): 80 CAPSULE, EXTENDED RELEASE ORAL at 06:01

## 2017-05-16 RX ADMIN — OXYCODONE HYDROCHLORIDE 10 MILLIGRAM(S): 5 TABLET ORAL at 23:50

## 2017-05-16 RX ADMIN — OXYCODONE HYDROCHLORIDE 10 MILLIGRAM(S): 5 TABLET ORAL at 14:06

## 2017-05-16 RX ADMIN — OXYCODONE HYDROCHLORIDE 10 MILLIGRAM(S): 5 TABLET ORAL at 18:04

## 2017-05-16 RX ADMIN — CARVEDILOL PHOSPHATE 12.5 MILLIGRAM(S): 80 CAPSULE, EXTENDED RELEASE ORAL at 17:43

## 2017-05-16 RX ADMIN — OXYCODONE HYDROCHLORIDE 10 MILLIGRAM(S): 5 TABLET ORAL at 22:50

## 2017-05-16 RX ADMIN — Medication 100 MILLIGRAM(S): at 13:57

## 2017-05-16 RX ADMIN — SENNA PLUS 2 TABLET(S): 8.6 TABLET ORAL at 21:25

## 2017-05-16 NOTE — PROGRESS NOTE ADULT - PROBLEM SELECTOR PLAN 1
- Sips and chips for now  - Serial abd exams  - Vac to be changed by ACS team 5/18/17  - Pain control with PO pain meds  - Encourage pt to ambulate as tolerated  - DVT ppx with lovenox  - Encourage incentive rose use for pulm toileting  - Plan discussed with attending

## 2017-05-16 NOTE — PROGRESS NOTE ADULT - SUBJECTIVE AND OBJECTIVE BOX
HPI/OVERNIGHT EVENTS: Patient seen and examined at bedside, states that "my back is killing me" patient encouraged to get out of bed to chair today. Endorses flatus and bowel movement today, patient voiding independently.  Patient complains of back pain, she states she has not yet gotten OOB to ambulate. She denies fever/chills, CP/SOB.    MEDICATIONS  (STANDING):  enoxaparin Injectable 40milliGRAM(s) SubCutaneous daily  pantoprazole  Injectable 40milliGRAM(s) IV Push daily  metoprolol Injectable 5milliGRAM(s) IV Push every 6 hours  lactated ringers. 1000milliLiter(s) IV Continuous <Continuous>  HYDROmorphone PCA (1 mG/mL) 30milliLiter(s) PCA Continuous PCA Continuous    MEDICATIONS  (PRN):  naloxone Injectable 0.1milliGRAM(s) IV Push every 3 minutes PRN For ANY of the following changes in patient status:  A. RR LESS THAN 10 breaths per minute, B. Oxygen saturation LESS THAN 90%, C. Sedation score of 6  ondansetron Injectable 4milliGRAM(s) IV Push every 6 hours PRN Nausea  HYDROmorphone PCA (1 mG/mL) Rescue Clinician Bolus 1milliGRAM(s) IV Push every 3 hours PRN Severe breakthrough pain        Constitutional: patient resting in bed, non-toxic appearing, in no acute distress  Respiratory: Breath Sounds CTA b/l, no accessory muscle use, no conversational dyspnea.  Cardiovascular: Regular rate & rhythm, +S1, S2  Gastrointestinal: Abdomen soft and non-distended, mild tenderness to palpation, no rebound tenderness / guarding, midline vac in place with good seal, no surrounding erythema or active drainage.   Extremities: No peripheral edema of lower extremities b/l  Neurological: A&O to person, place and time   Skin: Warm & dry, no rashes    05-16    139  |  103  |  25.0<H>  ----------------------------<  72  3.8   |  19.0<L>  |  0.43<L>    Ca    8.6      16 May 2017 06:34  Phos  2.3     05-16  Mg     2.1     05-16                          8.6    9.4   )-----------( 177      ( 16 May 2017 06:34 )             26.6

## 2017-05-17 LAB
ANION GAP SERPL CALC-SCNC: 17 MMOL/L — SIGNIFICANT CHANGE UP (ref 5–17)
BASE EXCESS BLDA CALC-SCNC: -3.6 MMOL/L — LOW (ref -3–3)
BASE EXCESS BLDA CALC-SCNC: -4.5 MMOL/L — LOW (ref -3–3)
BLOOD GAS COMMENTS ARTERIAL: SIGNIFICANT CHANGE UP
BLOOD GAS COMMENTS ARTERIAL: SIGNIFICANT CHANGE UP
BUN SERPL-MCNC: 25 MG/DL — HIGH (ref 8–20)
CALCIUM SERPL-MCNC: 8.4 MG/DL — LOW (ref 8.6–10.2)
CHLORIDE SERPL-SCNC: 101 MMOL/L — SIGNIFICANT CHANGE UP (ref 98–107)
CO2 SERPL-SCNC: 19 MMOL/L — LOW (ref 22–29)
CREAT SERPL-MCNC: 0.47 MG/DL — LOW (ref 0.5–1.3)
GAS PNL BLDA: SIGNIFICANT CHANGE UP
GAS PNL BLDA: SIGNIFICANT CHANGE UP
GLUCOSE SERPL-MCNC: 89 MG/DL — SIGNIFICANT CHANGE UP (ref 70–115)
HCO3 BLDA-SCNC: 21 MMOL/L — SIGNIFICANT CHANGE UP (ref 20–26)
HCO3 BLDA-SCNC: 21 MMOL/L — SIGNIFICANT CHANGE UP (ref 20–26)
HOROWITZ INDEX BLDA+IHG-RTO: SIGNIFICANT CHANGE UP
HOROWITZ INDEX BLDA+IHG-RTO: SIGNIFICANT CHANGE UP
MAGNESIUM SERPL-MCNC: 1.8 MG/DL — SIGNIFICANT CHANGE UP (ref 1.6–2.6)
PCO2 BLDA: 35 MMHG — SIGNIFICANT CHANGE UP (ref 35–45)
PCO2 BLDA: 39 MMHG — SIGNIFICANT CHANGE UP (ref 35–45)
PH BLDA: 7.35 — SIGNIFICANT CHANGE UP (ref 7.35–7.45)
PH BLDA: 7.37 — SIGNIFICANT CHANGE UP (ref 7.35–7.45)
PHOSPHATE SERPL-MCNC: 3.1 MG/DL — SIGNIFICANT CHANGE UP (ref 2.4–4.7)
PO2 BLDA: 61 MMHG — LOW (ref 83–108)
PO2 BLDA: 76 MMHG — LOW (ref 83–108)
POTASSIUM SERPL-MCNC: 3.8 MMOL/L — SIGNIFICANT CHANGE UP (ref 3.5–5.3)
POTASSIUM SERPL-SCNC: 3.8 MMOL/L — SIGNIFICANT CHANGE UP (ref 3.5–5.3)
SAO2 % BLDA: 93 % — LOW (ref 95–99)
SAO2 % BLDA: 97 % — SIGNIFICANT CHANGE UP (ref 95–99)
SODIUM SERPL-SCNC: 137 MMOL/L — SIGNIFICANT CHANGE UP (ref 135–145)

## 2017-05-17 PROCEDURE — 71010: CPT | Mod: 26

## 2017-05-17 PROCEDURE — 93010 ELECTROCARDIOGRAM REPORT: CPT

## 2017-05-17 RX ORDER — IPRATROPIUM/ALBUTEROL SULFATE 18-103MCG
3 AEROSOL WITH ADAPTER (GRAM) INHALATION ONCE
Qty: 0 | Refills: 0 | Status: COMPLETED | OUTPATIENT
Start: 2017-05-17 | End: 2017-05-17

## 2017-05-17 RX ORDER — LIDOCAINE 4 G/100G
1 CREAM TOPICAL DAILY
Qty: 0 | Refills: 0 | Status: DISCONTINUED | OUTPATIENT
Start: 2017-05-17 | End: 2017-05-23

## 2017-05-17 RX ORDER — MAGNESIUM SULFATE 500 MG/ML
2 VIAL (ML) INJECTION ONCE
Qty: 0 | Refills: 0 | Status: COMPLETED | OUTPATIENT
Start: 2017-05-17 | End: 2017-05-17

## 2017-05-17 RX ORDER — IPRATROPIUM/ALBUTEROL SULFATE 18-103MCG
3 AEROSOL WITH ADAPTER (GRAM) INHALATION EVERY 6 HOURS
Qty: 0 | Refills: 0 | Status: DISCONTINUED | OUTPATIENT
Start: 2017-05-17 | End: 2017-05-23

## 2017-05-17 RX ADMIN — OXYCODONE HYDROCHLORIDE 10 MILLIGRAM(S): 5 TABLET ORAL at 09:25

## 2017-05-17 RX ADMIN — ONDANSETRON 4 MILLIGRAM(S): 8 TABLET, FILM COATED ORAL at 01:15

## 2017-05-17 RX ADMIN — OXYCODONE HYDROCHLORIDE 10 MILLIGRAM(S): 5 TABLET ORAL at 22:17

## 2017-05-17 RX ADMIN — OXYCODONE HYDROCHLORIDE 10 MILLIGRAM(S): 5 TABLET ORAL at 19:06

## 2017-05-17 RX ADMIN — OXYCODONE HYDROCHLORIDE 10 MILLIGRAM(S): 5 TABLET ORAL at 13:50

## 2017-05-17 RX ADMIN — OXYCODONE HYDROCHLORIDE 10 MILLIGRAM(S): 5 TABLET ORAL at 14:30

## 2017-05-17 RX ADMIN — CARVEDILOL PHOSPHATE 12.5 MILLIGRAM(S): 80 CAPSULE, EXTENDED RELEASE ORAL at 05:43

## 2017-05-17 RX ADMIN — LIDOCAINE 1 PATCH: 4 CREAM TOPICAL at 22:18

## 2017-05-17 RX ADMIN — Medication 10 MILLIGRAM(S): at 12:06

## 2017-05-17 RX ADMIN — Medication 3 MILLILITER(S): at 14:08

## 2017-05-17 RX ADMIN — PANTOPRAZOLE SODIUM 40 MILLIGRAM(S): 20 TABLET, DELAYED RELEASE ORAL at 12:06

## 2017-05-17 RX ADMIN — OXYCODONE HYDROCHLORIDE 10 MILLIGRAM(S): 5 TABLET ORAL at 23:00

## 2017-05-17 RX ADMIN — Medication 3 MILLILITER(S): at 03:40

## 2017-05-17 RX ADMIN — Medication 3 MILLILITER(S): at 22:56

## 2017-05-17 RX ADMIN — Medication 100 MILLIGRAM(S): at 16:16

## 2017-05-17 RX ADMIN — OXYCODONE HYDROCHLORIDE 10 MILLIGRAM(S): 5 TABLET ORAL at 18:29

## 2017-05-17 RX ADMIN — OXYCODONE HYDROCHLORIDE 10 MILLIGRAM(S): 5 TABLET ORAL at 05:16

## 2017-05-17 RX ADMIN — ENOXAPARIN SODIUM 40 MILLIGRAM(S): 100 INJECTION SUBCUTANEOUS at 12:06

## 2017-05-17 RX ADMIN — CARVEDILOL PHOSPHATE 12.5 MILLIGRAM(S): 80 CAPSULE, EXTENDED RELEASE ORAL at 18:30

## 2017-05-17 RX ADMIN — Medication 50 GRAM(S): at 12:06

## 2017-05-17 RX ADMIN — OXYCODONE HYDROCHLORIDE 10 MILLIGRAM(S): 5 TABLET ORAL at 10:15

## 2017-05-17 RX ADMIN — OXYCODONE HYDROCHLORIDE 10 MILLIGRAM(S): 5 TABLET ORAL at 04:16

## 2017-05-17 RX ADMIN — Medication 90 MILLIGRAM(S): at 05:43

## 2017-05-17 NOTE — PROGRESS NOTE ADULT - SUBJECTIVE AND OBJECTIVE BOX
HPI/OVERNIGHT EVENTS: Patient seen and examined at bedside, states that she is thirsty and would like something to drink, patient encouraged to get out of bed to chair today. Endorses flatus and bowel movement today, patient voiding independently, endorses having a bowel movement but nursing states the patient has not had a bowel movement. Current pain regimen is working well for the patient she is currently on a non-rebreather mask for O2 desaturation, ABG obtained and SaO2 improving. She denies fever/chills, CP/SOB, N/V.     Vital Signs Last 24 Hrs  T(C): 36.7, Max: 37 (05-16 @ 16:22)  T(F): 98, Max: 98.6 (05-16 @ 16:22)  HR: 73 (73 - 81)  BP: 129/64 (109/55 - 129/64)  BP(mean): --  RR: 17 (17 - 18)  SpO2: 97% (90% - 97%)      MEDICATIONS  (STANDING):  enoxaparin Injectable 40milliGRAM(s) SubCutaneous daily  pantoprazole  Injectable 40milliGRAM(s) IV Push daily  metoprolol Injectable 5milliGRAM(s) IV Push every 6 hours  lactated ringers. 1000milliLiter(s) IV Continuous <Continuous>  HYDROmorphone PCA (1 mG/mL) 30milliLiter(s) PCA Continuous PCA Continuous    MEDICATIONS  (PRN):  naloxone Injectable 0.1milliGRAM(s) IV Push every 3 minutes PRN For ANY of the following changes in patient status:  A. RR LESS THAN 10 breaths per minute, B. Oxygen saturation LESS THAN 90%, C. Sedation score of 6  ondansetron Injectable 4milliGRAM(s) IV Push every 6 hours PRN Nausea  HYDROmorphone PCA (1 mG/mL) Rescue Clinician Bolus 1milliGRAM(s) IV Push every 3 hours PRN Severe breakthrough pain        Constitutional: patient resting in bed, non-toxic appearing, in no acute distress  Respiratory: Breath Sounds CTA b/l, no accessory muscle use, no conversational dyspnea.  Cardiovascular: Regular rate & rhythm, +S1, S2  Gastrointestinal: Abdomen soft and non-distended, mild tenderness to palpation, no rebound tenderness / guarding, midline vac in place with good seal, no surrounding erythema or active drainage.   Extremities: No peripheral edema of lower extremities b/l  Neurological: A&O to person, place and time   Skin: Warm & dry, no rashes    05-17    137  |  101  |  25.0<H>  ----------------------------<  89  3.8   |  19.0<L>  |  0.47<L>    Ca    8.4<L>      17 May 2017 05:51  Phos  3.1     05-17  Mg     1.8     05-17                          8.6    9.4   )-----------( 177      ( 16 May 2017 06:34 )             26.6

## 2017-05-17 NOTE — PROGRESS NOTE ADULT - PROBLEM SELECTOR PLAN 1
- Advanced to clears today, follow-up diet toleration  - Serial abd exams  - Vac to be changed by ACS team 5/18/17  - Pain control with PO pain meds  - Encourage pt to ambulate as tolerated  - DVT ppx with lovenox  - Encourage incentive rose use for pulm toileting  - Plan discussed with attending

## 2017-05-18 DIAGNOSIS — F43.21 ADJUSTMENT DISORDER WITH DEPRESSED MOOD: ICD-10-CM

## 2017-05-18 DIAGNOSIS — F32.9 MAJOR DEPRESSIVE DISORDER, SINGLE EPISODE, UNSPECIFIED: ICD-10-CM

## 2017-05-18 LAB
ANION GAP SERPL CALC-SCNC: 12 MMOL/L — SIGNIFICANT CHANGE UP (ref 5–17)
BUN SERPL-MCNC: 13 MG/DL — SIGNIFICANT CHANGE UP (ref 8–20)
CALCIUM SERPL-MCNC: 8 MG/DL — LOW (ref 8.6–10.2)
CHLORIDE SERPL-SCNC: 99 MMOL/L — SIGNIFICANT CHANGE UP (ref 98–107)
CO2 SERPL-SCNC: 28 MMOL/L — SIGNIFICANT CHANGE UP (ref 22–29)
CREAT SERPL-MCNC: 0.39 MG/DL — LOW (ref 0.5–1.3)
GAS PNL BLDA: SIGNIFICANT CHANGE UP
GLUCOSE SERPL-MCNC: 103 MG/DL — SIGNIFICANT CHANGE UP (ref 70–115)
MAGNESIUM SERPL-MCNC: 1.5 MG/DL — LOW (ref 1.6–2.6)
PHOSPHATE SERPL-MCNC: 2.2 MG/DL — LOW (ref 2.4–4.7)
POTASSIUM SERPL-MCNC: 3.2 MMOL/L — LOW (ref 3.5–5.3)
POTASSIUM SERPL-SCNC: 3.2 MMOL/L — LOW (ref 3.5–5.3)
SODIUM SERPL-SCNC: 139 MMOL/L — SIGNIFICANT CHANGE UP (ref 135–145)
SURGICAL PATHOLOGY FINAL REPORT - CH: SIGNIFICANT CHANGE UP

## 2017-05-18 PROCEDURE — 93970 EXTREMITY STUDY: CPT | Mod: 26

## 2017-05-18 PROCEDURE — 99223 1ST HOSP IP/OBS HIGH 75: CPT

## 2017-05-18 RX ORDER — POTASSIUM CHLORIDE 20 MEQ
10 PACKET (EA) ORAL ONCE
Qty: 0 | Refills: 0 | Status: COMPLETED | OUTPATIENT
Start: 2017-05-18 | End: 2017-05-18

## 2017-05-18 RX ORDER — ESCITALOPRAM OXALATE 10 MG/1
10 TABLET, FILM COATED ORAL ONCE
Qty: 0 | Refills: 0 | Status: COMPLETED | OUTPATIENT
Start: 2017-05-18 | End: 2017-05-18

## 2017-05-18 RX ORDER — MAGNESIUM SULFATE 500 MG/ML
2 VIAL (ML) INJECTION
Qty: 0 | Refills: 0 | Status: COMPLETED | OUTPATIENT
Start: 2017-05-18 | End: 2017-05-18

## 2017-05-18 RX ORDER — ESCITALOPRAM OXALATE 10 MG/1
10 TABLET, FILM COATED ORAL DAILY
Qty: 0 | Refills: 0 | Status: DISCONTINUED | OUTPATIENT
Start: 2017-05-19 | End: 2017-05-22

## 2017-05-18 RX ORDER — POTASSIUM PHOSPHATE, MONOBASIC POTASSIUM PHOSPHATE, DIBASIC 236; 224 MG/ML; MG/ML
15 INJECTION, SOLUTION INTRAVENOUS ONCE
Qty: 0 | Refills: 0 | Status: COMPLETED | OUTPATIENT
Start: 2017-05-18 | End: 2017-05-18

## 2017-05-18 RX ORDER — FUROSEMIDE 40 MG
20 TABLET ORAL ONCE
Qty: 0 | Refills: 0 | Status: COMPLETED | OUTPATIENT
Start: 2017-05-18 | End: 2017-05-18

## 2017-05-18 RX ADMIN — Medication 3 MILLILITER(S): at 14:35

## 2017-05-18 RX ADMIN — Medication 100 MILLIEQUIVALENT(S): at 17:19

## 2017-05-18 RX ADMIN — Medication 10 MILLIGRAM(S): at 16:17

## 2017-05-18 RX ADMIN — Medication 20 MILLIGRAM(S): at 01:03

## 2017-05-18 RX ADMIN — LIDOCAINE 1 PATCH: 4 CREAM TOPICAL at 09:00

## 2017-05-18 RX ADMIN — ESCITALOPRAM OXALATE 10 MILLIGRAM(S): 10 TABLET, FILM COATED ORAL at 16:11

## 2017-05-18 RX ADMIN — POTASSIUM PHOSPHATE, MONOBASIC POTASSIUM PHOSPHATE, DIBASIC 62.5 MILLIMOLE(S): 236; 224 INJECTION, SOLUTION INTRAVENOUS at 18:31

## 2017-05-18 RX ADMIN — CARVEDILOL PHOSPHATE 12.5 MILLIGRAM(S): 80 CAPSULE, EXTENDED RELEASE ORAL at 17:21

## 2017-05-18 RX ADMIN — OXYCODONE HYDROCHLORIDE 10 MILLIGRAM(S): 5 TABLET ORAL at 06:30

## 2017-05-18 RX ADMIN — OXYCODONE HYDROCHLORIDE 10 MILLIGRAM(S): 5 TABLET ORAL at 14:34

## 2017-05-18 RX ADMIN — LIDOCAINE 1 PATCH: 4 CREAM TOPICAL at 13:56

## 2017-05-18 RX ADMIN — Medication 3 MILLILITER(S): at 09:24

## 2017-05-18 RX ADMIN — Medication 3 MILLILITER(S): at 03:34

## 2017-05-18 RX ADMIN — CARVEDILOL PHOSPHATE 12.5 MILLIGRAM(S): 80 CAPSULE, EXTENDED RELEASE ORAL at 05:30

## 2017-05-18 RX ADMIN — OXYCODONE HYDROCHLORIDE 10 MILLIGRAM(S): 5 TABLET ORAL at 09:55

## 2017-05-18 RX ADMIN — OXYCODONE HYDROCHLORIDE 10 MILLIGRAM(S): 5 TABLET ORAL at 21:30

## 2017-05-18 RX ADMIN — OXYCODONE HYDROCHLORIDE 10 MILLIGRAM(S): 5 TABLET ORAL at 05:30

## 2017-05-18 RX ADMIN — OXYCODONE HYDROCHLORIDE 10 MILLIGRAM(S): 5 TABLET ORAL at 20:27

## 2017-05-18 RX ADMIN — Medication 3 MILLILITER(S): at 21:34

## 2017-05-18 RX ADMIN — Medication 50 GRAM(S): at 14:00

## 2017-05-18 RX ADMIN — Medication 90 MILLIGRAM(S): at 05:30

## 2017-05-18 RX ADMIN — Medication 50 GRAM(S): at 16:10

## 2017-05-18 RX ADMIN — PANTOPRAZOLE SODIUM 40 MILLIGRAM(S): 20 TABLET, DELAYED RELEASE ORAL at 13:59

## 2017-05-18 RX ADMIN — OXYCODONE HYDROCHLORIDE 10 MILLIGRAM(S): 5 TABLET ORAL at 16:25

## 2017-05-18 RX ADMIN — OXYCODONE HYDROCHLORIDE 10 MILLIGRAM(S): 5 TABLET ORAL at 16:24

## 2017-05-18 RX ADMIN — ENOXAPARIN SODIUM 40 MILLIGRAM(S): 100 INJECTION SUBCUTANEOUS at 13:58
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
## 2017-05-18 NOTE — DIETITIAN INITIAL EVALUATION ADULT. - OTHER INFO
Pt admitted with SBO, POD#6 s/p ex lap with HATTIE, SBR. Currently on clears, tolerating it well, eager to advance diet. Pt reports passing flatus, +BM.

## 2017-05-18 NOTE — BEHAVIORAL HEALTH ASSESSMENT NOTE - SUMMARY
Patient is endorsing hopelessness, suicidality without plan in relation to medical issues, immobility; and guilt in relation to past trauma.    recommend:  1) start lexapro 10mg daily  2) continue 1:1 supervision  3) psych will follow
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
## 2017-05-18 NOTE — CHART NOTE - NSCHARTNOTEFT_GEN_A_CORE
Patient seen and examined at [facility] after concerns re: O2 sat of 89 on NRB. Patient is lying in bed, easily awoken from sleep, appears in no acute distress. Patient states she feels "ok" at this time - when prompted admits to shortness of breath. She denies chest pain, nausea, lightheadedness, dizziness, nausea or vomiting. She is being managed for SBO and is POD#6 s/p ex-lap with SBR - tolerating CLD and states that pain is well controlled on current medication regimen. While conversing with patient she stated that she "wants to die" and went on to repeatedly state that she "wished she was just dead already" and that she feels like a burden to those around her. Sat and talked with patient at beside, her frustrations seem to stem from her current medical status as patient cites her recent back surgery and then this current hospitalization as the reason she feels this way.   General - NAD, non-toxic appearing, easily woken from sleep  Resp - lung sounds diminished as bases b/l, + rales to RLL, patient is talking in full sentences, no conversaional dyspnea, no accessory muscle use.  Cards - regular rate and rhythm, +S1 S2  Abd - soft, non-distended, mild alexia-incisional TTP, midline wound vac in place with good seal  Neuro - A&Ox3  Psych - depressed affect    A&P - 61 y/o female with SBO POD#6 s/p exlap HATTIE and SBR. In regards to SBO, patient is progressing well, diet was advanced to clears for which she has been tolerating without increase in pain or nausea / vomiting. Regarding patient's suicidal ideations - stat 1:1 constant observation placed and call will be made to psychiatry for evaluation. Shortness of breath to be investigated with the following   - Neb treatments ordered  - Suppl O2 as needed - keep patient on monitor &   - Stat ABG ordered  - Lasix 20mg IVP x 1  - B/l lower extremity duplex ordered  - Encourage patient to remain sitting up in bed and use incentive spirometer frequently   - CXR from earlier today showed atelectasis  - Will continue to reassess and monitor closely Patient seen and examined at [facility] after concerns re: O2 sat of 89 on NRB. Patient is lying in bed, easily awoken from sleep, appears in no acute distress. Patient states she feels "ok" at this time - when prompted admits to shortness of breath. She denies chest pain, nausea, lightheadedness, dizziness, nausea or vomiting. She is being managed for SBO and is POD#6 s/p ex-lap with SBR - tolerating CLD and states that pain is well controlled on current medication regimen. While conversing with patient she stated that she "wants to die" and went on to repeatedly state that she "wished she was just dead already" and that she feels like a burden to those around her. Sat and talked with patient at [facility], her frustrations seem to stem from her current medical status as patient cites her recent back surgery and then this current hospitalization as the reason she feels this way.   General - NAD, non-toxic appearing, easily woken from sleep  Resp - lung sounds diminished at bases b/l, + rales to RLL, patient is talking in full sentences, no conversational dyspnea, no accessory muscle use.  Cards - regular rate and rhythm, +S1 S2  Abd - soft, non-distended, mild alexia-incisional TTP, midline wound vac in place with good seal, no rebound tenderness or guarding  Neuro - A&Ox3  Psych - depressed affect    A&P - 61 y/o female with SBO POD#6 s/p exlap HATTIE and SBR. In regards to SBO, patient is progressing well, diet was advanced to clears for which she has been tolerating without increase in pain or nausea / vomiting. Regarding patient's suicidal ideations - stat 1:1 constant observation placed and call will be made to psychiatry for evaluation. Shortness of breath to be investigated with the following   - Neb treatments ordered  - Suppl O2 as needed - keep patient on monitor &   - Stat ABG ordered  - Lasix 20mg IVP x 1  - B/l lower extremity duplex ordered  - Encourage patient to remain sitting up in bed and use incentive spirometer frequently   - CXR from earlier today showed atelectasis  - Will continue to reassess and monitor closely

## 2017-05-19 LAB
ANION GAP SERPL CALC-SCNC: 11 MMOL/L — SIGNIFICANT CHANGE UP (ref 5–17)
BUN SERPL-MCNC: 10 MG/DL — SIGNIFICANT CHANGE UP (ref 8–20)
CALCIUM SERPL-MCNC: 7.8 MG/DL — LOW (ref 8.6–10.2)
CHLORIDE SERPL-SCNC: 99 MMOL/L — SIGNIFICANT CHANGE UP (ref 98–107)
CO2 SERPL-SCNC: 28 MMOL/L — SIGNIFICANT CHANGE UP (ref 22–29)
CREAT SERPL-MCNC: 0.4 MG/DL — LOW (ref 0.5–1.3)
GLUCOSE SERPL-MCNC: 114 MG/DL — SIGNIFICANT CHANGE UP (ref 70–115)
MAGNESIUM SERPL-MCNC: 2.3 MG/DL — SIGNIFICANT CHANGE UP (ref 1.8–2.6)
PHOSPHATE SERPL-MCNC: 2.8 MG/DL — SIGNIFICANT CHANGE UP (ref 2.4–4.7)
POTASSIUM SERPL-MCNC: 3.6 MMOL/L — SIGNIFICANT CHANGE UP (ref 3.5–5.3)
POTASSIUM SERPL-SCNC: 3.6 MMOL/L — SIGNIFICANT CHANGE UP (ref 3.5–5.3)
SODIUM SERPL-SCNC: 138 MMOL/L — SIGNIFICANT CHANGE UP (ref 135–145)

## 2017-05-19 PROCEDURE — 99232 SBSQ HOSP IP/OBS MODERATE 35: CPT

## 2017-05-19 RX ORDER — POTASSIUM CHLORIDE 20 MEQ
40 PACKET (EA) ORAL ONCE
Qty: 0 | Refills: 0 | Status: COMPLETED | OUTPATIENT
Start: 2017-05-19 | End: 2017-05-19

## 2017-05-19 RX ORDER — POTASSIUM PHOSPHATE, MONOBASIC POTASSIUM PHOSPHATE, DIBASIC 236; 224 MG/ML; MG/ML
15 INJECTION, SOLUTION INTRAVENOUS ONCE
Qty: 0 | Refills: 0 | Status: COMPLETED | OUTPATIENT
Start: 2017-05-19 | End: 2017-05-19

## 2017-05-19 RX ADMIN — ENOXAPARIN SODIUM 40 MILLIGRAM(S): 100 INJECTION SUBCUTANEOUS at 13:30

## 2017-05-19 RX ADMIN — OXYCODONE HYDROCHLORIDE 10 MILLIGRAM(S): 5 TABLET ORAL at 12:08

## 2017-05-19 RX ADMIN — OXYCODONE HYDROCHLORIDE 10 MILLIGRAM(S): 5 TABLET ORAL at 16:45

## 2017-05-19 RX ADMIN — OXYCODONE HYDROCHLORIDE 10 MILLIGRAM(S): 5 TABLET ORAL at 22:00

## 2017-05-19 RX ADMIN — Medication 90 MILLIGRAM(S): at 05:48

## 2017-05-19 RX ADMIN — LIDOCAINE 1 PATCH: 4 CREAM TOPICAL at 13:31

## 2017-05-19 RX ADMIN — Medication 3 MILLILITER(S): at 03:05

## 2017-05-19 RX ADMIN — LIDOCAINE 1 PATCH: 4 CREAM TOPICAL at 01:21

## 2017-05-19 RX ADMIN — Medication 3 MILLILITER(S): at 09:40

## 2017-05-19 RX ADMIN — OXYCODONE HYDROCHLORIDE 10 MILLIGRAM(S): 5 TABLET ORAL at 20:59

## 2017-05-19 RX ADMIN — CARVEDILOL PHOSPHATE 12.5 MILLIGRAM(S): 80 CAPSULE, EXTENDED RELEASE ORAL at 05:48

## 2017-05-19 RX ADMIN — Medication 10 MILLIGRAM(S): at 13:30

## 2017-05-19 RX ADMIN — Medication 3 MILLILITER(S): at 20:43

## 2017-05-19 RX ADMIN — ONDANSETRON 4 MILLIGRAM(S): 8 TABLET, FILM COATED ORAL at 21:05

## 2017-05-19 RX ADMIN — Medication 3 MILLILITER(S): at 15:55

## 2017-05-19 RX ADMIN — PANTOPRAZOLE SODIUM 40 MILLIGRAM(S): 20 TABLET, DELAYED RELEASE ORAL at 13:30

## 2017-05-19 RX ADMIN — ESCITALOPRAM OXALATE 10 MILLIGRAM(S): 10 TABLET, FILM COATED ORAL at 13:30

## 2017-05-19 RX ADMIN — OXYCODONE HYDROCHLORIDE 10 MILLIGRAM(S): 5 TABLET ORAL at 06:00

## 2017-05-19 RX ADMIN — OXYCODONE HYDROCHLORIDE 10 MILLIGRAM(S): 5 TABLET ORAL at 13:08

## 2017-05-19 RX ADMIN — OXYCODONE HYDROCHLORIDE 10 MILLIGRAM(S): 5 TABLET ORAL at 05:46

## 2017-05-19 RX ADMIN — CARVEDILOL PHOSPHATE 12.5 MILLIGRAM(S): 80 CAPSULE, EXTENDED RELEASE ORAL at 19:47

## 2017-05-19 NOTE — PROGRESS NOTE ADULT - SUBJECTIVE AND OBJECTIVE BOX
Subjective:  Pt offers no acute complaints. Continues on venti mask. Tolerating clear liquid diet.     STATUS POST: ex-lap small bowel resection    POST OPERATIVE DAY #: 7    MEDICATIONS  (STANDING):  enoxaparin Injectable 40milliGRAM(s) SubCutaneous daily  pantoprazole  Injectable 40milliGRAM(s) IV Push daily  bisacodyl Suppository 10milliGRAM(s) Rectal daily  dextrose 50% Injectable 12.5Gram(s) IV Push once  carvedilol 12.5milliGRAM(s) Oral every 12 hours  NIFEdipine XL 90milliGRAM(s) Oral daily  docusate sodium 100milliGRAM(s) Oral three times a day  senna 2Tablet(s) Oral at bedtime  ALBUTerol/ipratropium for Nebulization 3milliLiter(s) Nebulizer every 6 hours  lidocaine   Patch 1Patch Transdermal daily  escitalopram 10milliGRAM(s) Oral daily  potassium phosphate IVPB 15milliMole(s) IV Intermittent once  potassium chloride   Powder 40milliEquivalent(s) Oral once    MEDICATIONS  (PRN):  naloxone Injectable 0.1milliGRAM(s) IV Push every 3 minutes PRN For ANY of the following changes in patient status:  A. RR LESS THAN 10 breaths per minute, B. Oxygen saturation LESS THAN 90%, C. Sedation score of 6  ondansetron Injectable 4milliGRAM(s) IV Push every 6 hours PRN Nausea  acetaminophen   Tablet. 650milliGRAM(s) Oral every 6 hours PRN Mild Pain (1 - 3)  oxyCODONE IR 5milliGRAM(s) Oral every 4 hours PRN Moderate Pain (4 - 6)  oxyCODONE IR 10milliGRAM(s) Oral every 4 hours PRN Severe Pain (7 - 10)      Vital Signs Last 24 Hrs  T(C): 37.4, Max: 37.7 (05-18 @ 15:21)  T(F): 99.3, Max: 99.8 (05-18 @ 15:21)  HR: 70 (70 - 89)  BP: 130/64 (124/58 - 160/59)  BP(mean): --  RR: 19 (16 - 20)  SpO2: 98% (90% - 98%)    Physical Exam:    Constitutional: NAD  HEENT: PERRL, EOMI  Neck: No JVD, FROM without pain  Respiratory: +rales on right side, breath sounds diminished at the bases  Cardiovascular: Regular rate & rhythm, S1, S2  Gastrointestinal: Soft, mildly tender, incision well approximated without erythema or discharge  Neurological: A&O x 3; without gross deficit  Musculoskeletal: No joint pain, swelling, deformity, or point tenderness; no limitation of movement      LABS:    05-19    138  |  99  |  10.0  ----------------------------<  114  3.6   |  28.0  |  0.40<L>    Ca    7.8<L>      19 May 2017 05:53  Phos  2.8     05-19  Mg     2.3     05-19

## 2017-05-19 NOTE — OCCUPATIONAL THERAPY INITIAL EVALUATION ADULT - PLANNED THERAPY INTERVENTIONS, OT EVAL
transfer training/IADL retraining/bed mobility training/ROM/ADL retraining/motor coordination training/balance training/strengthening

## 2017-05-19 NOTE — PROGRESS NOTE BEHAVIORAL HEALTH - NSBHFUPINTERVALCCFT_PSY_A_CORE
Patient is a 53 year old female admitted to [facility] for severe back pain nausea and vomitting found to have SBO and now SBR, psychiatry consulted yesterday for suicidal ideation in the context of psychosocial stressors and chronic back pain. Patient was started on Lexapro today with no noted SE. She has advanced to clear liquids and reports passing flatus and positive BM. Patient continues to verbalize suicidal ideation however denies plan. She was tearful during visit frequently stating " I want to die." Patient is a 53 year old female admitted to [facility] for severe back pain nausea and vomitting found to have SBO and now SBR, psychiatry consulted yesterday for suicidal ideation in the context of psychosocial stressors and chronic back pain. Patient was started on Lexapro today with no noted SE. She has advanced to clear liquids and reports passing flatus and positive BM.

## 2017-05-19 NOTE — PROGRESS NOTE ADULT - PROBLEM SELECTOR PLAN 1
Will trial the patient on nasal cannula oxygen  Consider advancing diet, will d/w attending  Encourage IS Will trial the patient on nasal cannula oxygen  Consider advancing diet, will d/w attending  Encourage IS  DVT ppx  Pain control

## 2017-05-19 NOTE — OCCUPATIONAL THERAPY INITIAL EVALUATION ADULT - ADDITIONAL COMMENTS
Pt reports becoming increasingly weak after her back surgery  Pt has tub with [facility]  Pt owns a RW

## 2017-05-20 LAB
ANION GAP SERPL CALC-SCNC: 9 MMOL/L — SIGNIFICANT CHANGE UP (ref 5–17)
BUN SERPL-MCNC: 9 MG/DL — SIGNIFICANT CHANGE UP (ref 8–20)
CALCIUM SERPL-MCNC: 7.7 MG/DL — LOW (ref 8.6–10.2)
CHLORIDE SERPL-SCNC: 101 MMOL/L — SIGNIFICANT CHANGE UP (ref 98–107)
CO2 SERPL-SCNC: 30 MMOL/L — HIGH (ref 22–29)
CREAT SERPL-MCNC: 0.36 MG/DL — LOW (ref 0.5–1.3)
GLUCOSE SERPL-MCNC: 113 MG/DL — SIGNIFICANT CHANGE UP (ref 70–115)
HCT VFR BLD CALC: 21.9 % — LOW (ref 37–47)
HGB BLD-MCNC: 7.1 G/DL — LOW (ref 12–16)
MAGNESIUM SERPL-MCNC: 2 MG/DL — SIGNIFICANT CHANGE UP (ref 1.6–2.6)
MCHC RBC-ENTMCNC: 20.6 PG — LOW (ref 27–31)
MCHC RBC-ENTMCNC: 32.4 G/DL — SIGNIFICANT CHANGE UP (ref 32–36)
MCV RBC AUTO: 63.7 FL — LOW (ref 81–99)
PHOSPHATE SERPL-MCNC: 2.9 MG/DL — SIGNIFICANT CHANGE UP (ref 2.4–4.7)
PLATELET # BLD AUTO: 215 K/UL — SIGNIFICANT CHANGE UP (ref 150–400)
POTASSIUM SERPL-MCNC: 4 MMOL/L — SIGNIFICANT CHANGE UP (ref 3.5–5.3)
POTASSIUM SERPL-SCNC: 4 MMOL/L — SIGNIFICANT CHANGE UP (ref 3.5–5.3)
RBC # BLD: 3.44 M/UL — LOW (ref 4.4–5.2)
RBC # FLD: 16.3 % — HIGH (ref 11–15.6)
SODIUM SERPL-SCNC: 140 MMOL/L — SIGNIFICANT CHANGE UP (ref 135–145)
WBC # BLD: 5.6 K/UL — SIGNIFICANT CHANGE UP (ref 4.8–10.8)
WBC # FLD AUTO: 5.6 K/UL — SIGNIFICANT CHANGE UP (ref 4.8–10.8)

## 2017-05-20 RX ORDER — IBUPROFEN 200 MG
400 TABLET ORAL ONCE
Qty: 0 | Refills: 0 | Status: COMPLETED | OUTPATIENT
Start: 2017-05-20 | End: 2017-05-20

## 2017-05-20 RX ORDER — ACETAMINOPHEN 500 MG
1000 TABLET ORAL ONCE
Qty: 0 | Refills: 0 | Status: COMPLETED | OUTPATIENT
Start: 2017-05-20 | End: 2017-05-20

## 2017-05-20 RX ADMIN — Medication 10 MILLIGRAM(S): at 12:33

## 2017-05-20 RX ADMIN — Medication 400 MILLIGRAM(S): at 23:29

## 2017-05-20 RX ADMIN — LIDOCAINE 1 PATCH: 4 CREAM TOPICAL at 01:17

## 2017-05-20 RX ADMIN — OXYCODONE HYDROCHLORIDE 10 MILLIGRAM(S): 5 TABLET ORAL at 19:00

## 2017-05-20 RX ADMIN — OXYCODONE HYDROCHLORIDE 10 MILLIGRAM(S): 5 TABLET ORAL at 18:40

## 2017-05-20 RX ADMIN — OXYCODONE HYDROCHLORIDE 10 MILLIGRAM(S): 5 TABLET ORAL at 12:33

## 2017-05-20 RX ADMIN — Medication 3 MILLILITER(S): at 20:45

## 2017-05-20 RX ADMIN — ESCITALOPRAM OXALATE 10 MILLIGRAM(S): 10 TABLET, FILM COATED ORAL at 14:21

## 2017-05-20 RX ADMIN — CARVEDILOL PHOSPHATE 12.5 MILLIGRAM(S): 80 CAPSULE, EXTENDED RELEASE ORAL at 17:07

## 2017-05-20 RX ADMIN — Medication 400 MILLIGRAM(S): at 23:19

## 2017-05-20 RX ADMIN — Medication 1000 MILLIGRAM(S): at 23:05

## 2017-05-20 RX ADMIN — Medication 3 MILLILITER(S): at 08:19

## 2017-05-20 RX ADMIN — LIDOCAINE 1 PATCH: 4 CREAM TOPICAL at 14:20

## 2017-05-20 RX ADMIN — Medication 3 MILLILITER(S): at 02:38

## 2017-05-20 RX ADMIN — CARVEDILOL PHOSPHATE 12.5 MILLIGRAM(S): 80 CAPSULE, EXTENDED RELEASE ORAL at 04:44

## 2017-05-20 RX ADMIN — Medication 400 MILLIGRAM(S): at 22:23

## 2017-05-20 RX ADMIN — Medication 3 MILLILITER(S): at 15:11

## 2017-05-20 RX ADMIN — OXYCODONE HYDROCHLORIDE 10 MILLIGRAM(S): 5 TABLET ORAL at 04:45

## 2017-05-20 RX ADMIN — Medication 100 MILLIGRAM(S): at 12:32

## 2017-05-20 RX ADMIN — PANTOPRAZOLE SODIUM 40 MILLIGRAM(S): 20 TABLET, DELAYED RELEASE ORAL at 12:33

## 2017-05-20 RX ADMIN — ENOXAPARIN SODIUM 40 MILLIGRAM(S): 100 INJECTION SUBCUTANEOUS at 12:32

## 2017-05-20 RX ADMIN — OXYCODONE HYDROCHLORIDE 10 MILLIGRAM(S): 5 TABLET ORAL at 05:39

## 2017-05-20 RX ADMIN — Medication 90 MILLIGRAM(S): at 04:44

## 2017-05-20 NOTE — PROGRESS NOTE ADULT - SUBJECTIVE AND OBJECTIVE BOX
Subjective:  Pt doing well, offers no acute complaints. Tolerating CLD, and denies SOB on nasal cannula. +Fl    STATUS POST:  ex-lap, SBR    POST OPERATIVE DAY #: 8    MEDICATIONS  (STANDING):  enoxaparin Injectable 40milliGRAM(s) SubCutaneous daily  pantoprazole  Injectable 40milliGRAM(s) IV Push daily  bisacodyl Suppository 10milliGRAM(s) Rectal daily  dextrose 50% Injectable 12.5Gram(s) IV Push once  carvedilol 12.5milliGRAM(s) Oral every 12 hours  NIFEdipine XL 90milliGRAM(s) Oral daily  docusate sodium 100milliGRAM(s) Oral three times a day  senna 2Tablet(s) Oral at bedtime  ALBUTerol/ipratropium for Nebulization 3milliLiter(s) Nebulizer every 6 hours  lidocaine   Patch 1Patch Transdermal daily  escitalopram 10milliGRAM(s) Oral daily    MEDICATIONS  (PRN):  naloxone Injectable 0.1milliGRAM(s) IV Push every 3 minutes PRN For ANY of the following changes in patient status:  A. RR LESS THAN 10 breaths per minute, B. Oxygen saturation LESS THAN 90%, C. Sedation score of 6  ondansetron Injectable 4milliGRAM(s) IV Push every 6 hours PRN Nausea  acetaminophen   Tablet. 650milliGRAM(s) Oral every 6 hours PRN Mild Pain (1 - 3)  oxyCODONE IR 5milliGRAM(s) Oral every 4 hours PRN Moderate Pain (4 - 6)  oxyCODONE IR 10milliGRAM(s) Oral every 4 hours PRN Severe Pain (7 - 10)      Vital Signs Last 24 Hrs  T(C): 36.7, Max: 37.3 (05-19 @ 16:21)  T(F): 98, Max: 99.1 (05-19 @ 16:21)  HR: 67 (67 - 80)  BP: 145/77 (135/64 - 158/73)  BP(mean): --  RR: 18 (16 - 19)  SpO2: 96% (90% - 100%)    Physical Exam:    Constitutional: NAD  HEENT: PERRL, EOMI  Neck: No JVD, FROM without pain  Respiratory:no accessory muscle use  Cardiovascular: S1, S2  Gastrointestinal: Soft, non-tender, non distended  Neurological: A&O x 3; without gross deficit  Musculoskeletal: No joint pain, swelling, deformity, or point tenderness; no limitation of movement      LABS:                        7.1    5.6   )-----------( 215      ( 20 May 2017 06:28 )             21.9     05-20    140  |  101  |  9.0  ----------------------------<  113  4.0   |  30.0<H>  |  0.36<L>    Ca    7.7<L>      20 May 2017 06:28  Phos  2.9     05-20  Mg     2.0     05-20

## 2017-05-21 LAB
ANION GAP SERPL CALC-SCNC: 11 MMOL/L — SIGNIFICANT CHANGE UP (ref 5–17)
BUN SERPL-MCNC: 8 MG/DL — SIGNIFICANT CHANGE UP (ref 8–20)
CALCIUM SERPL-MCNC: 8 MG/DL — LOW (ref 8.6–10.2)
CHLORIDE SERPL-SCNC: 98 MMOL/L — SIGNIFICANT CHANGE UP (ref 98–107)
CO2 SERPL-SCNC: 32 MMOL/L — HIGH (ref 22–29)
CREAT SERPL-MCNC: 0.42 MG/DL — LOW (ref 0.5–1.3)
GLUCOSE SERPL-MCNC: 98 MG/DL — SIGNIFICANT CHANGE UP (ref 70–115)
HCT VFR BLD CALC: 24.1 % — LOW (ref 37–47)
HGB BLD-MCNC: 7.6 G/DL — LOW (ref 12–16)
MAGNESIUM SERPL-MCNC: 1.9 MG/DL — SIGNIFICANT CHANGE UP (ref 1.6–2.6)
MCHC RBC-ENTMCNC: 20.3 PG — LOW (ref 27–31)
MCHC RBC-ENTMCNC: 31.5 G/DL — LOW (ref 32–36)
MCV RBC AUTO: 64.4 FL — LOW (ref 81–99)
PHOSPHATE SERPL-MCNC: 2.9 MG/DL — SIGNIFICANT CHANGE UP (ref 2.4–4.7)
PLATELET # BLD AUTO: 273 K/UL — SIGNIFICANT CHANGE UP (ref 150–400)
POTASSIUM SERPL-MCNC: 4 MMOL/L — SIGNIFICANT CHANGE UP (ref 3.5–5.3)
POTASSIUM SERPL-SCNC: 4 MMOL/L — SIGNIFICANT CHANGE UP (ref 3.5–5.3)
RBC # BLD: 3.74 M/UL — LOW (ref 4.4–5.2)
RBC # FLD: 16.6 % — HIGH (ref 11–15.6)
SODIUM SERPL-SCNC: 141 MMOL/L — SIGNIFICANT CHANGE UP (ref 135–145)
WBC # BLD: 6.7 K/UL — SIGNIFICANT CHANGE UP (ref 4.8–10.8)
WBC # FLD AUTO: 6.7 K/UL — SIGNIFICANT CHANGE UP (ref 4.8–10.8)

## 2017-05-21 RX ORDER — HYDROMORPHONE HYDROCHLORIDE 2 MG/ML
0.5 INJECTION INTRAMUSCULAR; INTRAVENOUS; SUBCUTANEOUS
Qty: 0 | Refills: 0 | Status: DISCONTINUED | OUTPATIENT
Start: 2017-05-21 | End: 2017-05-23

## 2017-05-21 RX ORDER — PANTOPRAZOLE SODIUM 20 MG/1
40 TABLET, DELAYED RELEASE ORAL
Qty: 0 | Refills: 0 | Status: DISCONTINUED | OUTPATIENT
Start: 2017-05-21 | End: 2017-05-23

## 2017-05-21 RX ORDER — OXYCODONE HYDROCHLORIDE 5 MG/1
15 TABLET ORAL EVERY 4 HOURS
Qty: 0 | Refills: 0 | Status: DISCONTINUED | OUTPATIENT
Start: 2017-05-21 | End: 2017-05-23

## 2017-05-21 RX ORDER — OXYCODONE HYDROCHLORIDE 5 MG/1
10 TABLET ORAL EVERY 4 HOURS
Qty: 0 | Refills: 0 | Status: DISCONTINUED | OUTPATIENT
Start: 2017-05-21 | End: 2017-05-23

## 2017-05-21 RX ADMIN — LIDOCAINE 1 PATCH: 4 CREAM TOPICAL at 11:27

## 2017-05-21 RX ADMIN — OXYCODONE HYDROCHLORIDE 15 MILLIGRAM(S): 5 TABLET ORAL at 19:53

## 2017-05-21 RX ADMIN — OXYCODONE HYDROCHLORIDE 15 MILLIGRAM(S): 5 TABLET ORAL at 15:15

## 2017-05-21 RX ADMIN — PANTOPRAZOLE SODIUM 40 MILLIGRAM(S): 20 TABLET, DELAYED RELEASE ORAL at 19:54

## 2017-05-21 RX ADMIN — HYDROMORPHONE HYDROCHLORIDE 0.5 MILLIGRAM(S): 2 INJECTION INTRAMUSCULAR; INTRAVENOUS; SUBCUTANEOUS at 19:15

## 2017-05-21 RX ADMIN — Medication 3 MILLILITER(S): at 15:44

## 2017-05-21 RX ADMIN — OXYCODONE HYDROCHLORIDE 10 MILLIGRAM(S): 5 TABLET ORAL at 00:08

## 2017-05-21 RX ADMIN — Medication 90 MILLIGRAM(S): at 06:52

## 2017-05-21 RX ADMIN — LIDOCAINE 1 PATCH: 4 CREAM TOPICAL at 21:23

## 2017-05-21 RX ADMIN — OXYCODONE HYDROCHLORIDE 10 MILLIGRAM(S): 5 TABLET ORAL at 11:22

## 2017-05-21 RX ADMIN — Medication 3 MILLILITER(S): at 09:30

## 2017-05-21 RX ADMIN — HYDROMORPHONE HYDROCHLORIDE 0.5 MILLIGRAM(S): 2 INJECTION INTRAMUSCULAR; INTRAVENOUS; SUBCUTANEOUS at 16:00

## 2017-05-21 RX ADMIN — OXYCODONE HYDROCHLORIDE 15 MILLIGRAM(S): 5 TABLET ORAL at 21:24

## 2017-05-21 RX ADMIN — LIDOCAINE 1 PATCH: 4 CREAM TOPICAL at 06:54

## 2017-05-21 RX ADMIN — Medication 3 MILLILITER(S): at 02:35

## 2017-05-21 RX ADMIN — OXYCODONE HYDROCHLORIDE 10 MILLIGRAM(S): 5 TABLET ORAL at 12:30

## 2017-05-21 RX ADMIN — CARVEDILOL PHOSPHATE 12.5 MILLIGRAM(S): 80 CAPSULE, EXTENDED RELEASE ORAL at 06:52

## 2017-05-21 RX ADMIN — HYDROMORPHONE HYDROCHLORIDE 0.5 MILLIGRAM(S): 2 INJECTION INTRAMUSCULAR; INTRAVENOUS; SUBCUTANEOUS at 19:00

## 2017-05-21 RX ADMIN — PANTOPRAZOLE SODIUM 40 MILLIGRAM(S): 20 TABLET, DELAYED RELEASE ORAL at 11:27

## 2017-05-21 RX ADMIN — Medication 3 MILLILITER(S): at 21:39

## 2017-05-21 RX ADMIN — ENOXAPARIN SODIUM 40 MILLIGRAM(S): 100 INJECTION SUBCUTANEOUS at 14:56

## 2017-05-21 RX ADMIN — ESCITALOPRAM OXALATE 10 MILLIGRAM(S): 10 TABLET, FILM COATED ORAL at 11:27

## 2017-05-21 RX ADMIN — CARVEDILOL PHOSPHATE 12.5 MILLIGRAM(S): 80 CAPSULE, EXTENDED RELEASE ORAL at 19:54

## 2017-05-21 RX ADMIN — HYDROMORPHONE HYDROCHLORIDE 0.5 MILLIGRAM(S): 2 INJECTION INTRAMUSCULAR; INTRAVENOUS; SUBCUTANEOUS at 15:45

## 2017-05-21 RX ADMIN — OXYCODONE HYDROCHLORIDE 15 MILLIGRAM(S): 5 TABLET ORAL at 16:30

## 2017-05-21 NOTE — PROGRESS NOTE ADULT - PROBLEM SELECTOR PLAN 1
Advance to regular diet  IVL  Encourage IS  OOB to chair as much as possible  DVT  Increase current pain regimen  Continue monitoring respiratory status  Monitor bowel function  Vac change today  Awaiting acceptance to [facility]

## 2017-05-21 NOTE — PROGRESS NOTE ADULT - SUBJECTIVE AND OBJECTIVE BOX
INTERVAL HPI/OVERNIGHT EVENTS:    Pt with desaturation while asleep overnight.  Pt with no subjective c/o of SOB.  States she will get OOB and continue to work with incentive spirometer.  Pt states current pain regimen is not adequately controlling her pain.  Pt diet was advanced to regular and is currently tolerating.  + Flatus, + BM.      STATUS POST:  ex-lap, SBR    POST OPERATIVE DAY #: 9    MEDICATIONS  (STANDING):  enoxaparin Injectable 40milliGRAM(s) SubCutaneous daily  bisacodyl Suppository 10milliGRAM(s) Rectal daily  dextrose 50% Injectable 12.5Gram(s) IV Push once  carvedilol 12.5milliGRAM(s) Oral every 12 hours  NIFEdipine XL 90milliGRAM(s) Oral daily  docusate sodium 100milliGRAM(s) Oral three times a day  senna 2Tablet(s) Oral at bedtime  ALBUTerol/ipratropium for Nebulization 3milliLiter(s) Nebulizer every 6 hours  lidocaine   Patch 1Patch Transdermal daily  escitalopram 10milliGRAM(s) Oral daily  pantoprazole    Tablet 40milliGRAM(s) Oral before breakfast    MEDICATIONS  (PRN):  naloxone Injectable 0.1milliGRAM(s) IV Push every 3 minutes PRN For ANY of the following changes in patient status:  A. RR LESS THAN 10 breaths per minute, B. Oxygen saturation LESS THAN 90%, C. Sedation score of 6  ondansetron Injectable 4milliGRAM(s) IV Push every 6 hours PRN Nausea  acetaminophen   Tablet. 650milliGRAM(s) Oral every 6 hours PRN Mild Pain (1 - 3)  HYDROmorphone  Injectable 0.5milliGRAM(s) SubCutaneous every 3 hours PRN breakthrough pain  oxyCODONE IR 15milliGRAM(s) Oral every 4 hours PRN Severe Pain (7 - 10)  oxyCODONE IR 10milliGRAM(s) Oral every 4 hours PRN Moderate Pain (4 - 6)      Vital Signs Last 24 Hrs  T(C): 37.1, Max: 37.1 (05-21 @ 05:05)  T(F): 98.8, Max: 98.8 (05-21 @ 05:05)  HR: 87 (68 - 87)  BP: 131/68 (131/68 - 160/77)  BP(mean): --  RR: 18 (18 - 20)  SpO2: 95% (91% - 95%)    Physical Exam:    Constitutional: NAD    HEENT: PERRL, EOMI    Neck: No JVD, FROM without pain    Respiratory:no accessory muscle use    Cardiovascular: S1, S2    Gastrointestinal: Soft, non-tender, non distended    Neurological: A&O x 3; without gross deficit    Musculoskeletal: No joint pain, swelling, deformity, or point tenderness; no limitation of movement      I&O's Detail  I & Os for 24h ending 21 May 2017 07:00  =============================================  IN:    Oral Fluid: 1080 ml    Total IN: 1080 ml  ---------------------------------------------  OUT:    Total OUT: 0 ml  ---------------------------------------------  Total NET: 1080 ml    I & Os for current day (as of 21 May 2017 16:02)  =============================================  IN:    Oral Fluid: 120 ml    Total IN: 120 ml  ---------------------------------------------  OUT:    Total OUT: 0 ml  ---------------------------------------------  Total NET: 120 ml      LABS:                        7.6    6.7   )-----------( 273      ( 21 May 2017 08:09 )             24.1     05-21    141  |  98  |  8.0  ----------------------------<  98  4.0   |  32.0<H>  |  0.42<L>    Ca    8.0<L>      21 May 2017 08:09  Phos  2.9     05-21  Mg     1.9     05-21            RADIOLOGY & ADDITIONAL STUDIES:

## 2017-05-21 NOTE — PROGRESS NOTE ADULT - ATTENDING COMMENTS
no new issues. improving respiratory function  awaiting dispo.
I have read, reviewed and approve of note above.  I encouraged Corby Bucky to be OOB AMB as much as possible.
The patient was seen and examined  Events noted  She needs better analgesia--will provide a rescue dose of Dilaudid and re-educate on PCA  Once better will mobilize OOB  Pulmonary toilet  DVT prophylaxis
I have read, reviewed, and agree c/ note above.  Encourage OOB amb.  Adjust PCA as pt madi.  Pt had 4L IVF and minimal urine output in the OR so preston is necessary for strict I/Os.  Con't NGT and await return of bowel function.
I have read, reviewed, and approve of note above.  Mrs. Lawler fell asleep quickly when not stimulated but is easily arousable. She has a high baseline narcotic usage due to her back pain but still will be monitored closely for overnarcotizing.  Her Creat has increased and her I/Os are still borderline so preston is still necessary for strict fluid monitoring.  Will con't NGT because still has no flatus or BM- await return of bowel fn.  Encourage OOB c/ assistance and IS.
advance to regular diet  increase IS use, oob to chair and ambulate. cont to wean NC as tolerating.

## 2017-05-22 PROCEDURE — 99232 SBSQ HOSP IP/OBS MODERATE 35: CPT

## 2017-05-22 RX ORDER — MAGNESIUM SULFATE 500 MG/ML
2 VIAL (ML) INJECTION ONCE
Qty: 0 | Refills: 0 | Status: DISCONTINUED | OUTPATIENT
Start: 2017-05-22 | End: 2017-05-23

## 2017-05-22 RX ORDER — ESCITALOPRAM OXALATE 10 MG/1
15 TABLET, FILM COATED ORAL DAILY
Qty: 0 | Refills: 0 | Status: DISCONTINUED | OUTPATIENT
Start: 2017-05-23 | End: 2017-05-23

## 2017-05-22 RX ORDER — POTASSIUM CHLORIDE 20 MEQ
40 PACKET (EA) ORAL ONCE
Qty: 0 | Refills: 0 | Status: COMPLETED | OUTPATIENT
Start: 2017-05-22 | End: 2017-05-22

## 2017-05-22 RX ADMIN — OXYCODONE HYDROCHLORIDE 10 MILLIGRAM(S): 5 TABLET ORAL at 11:49

## 2017-05-22 RX ADMIN — ESCITALOPRAM OXALATE 10 MILLIGRAM(S): 10 TABLET, FILM COATED ORAL at 11:48

## 2017-05-22 RX ADMIN — CARVEDILOL PHOSPHATE 12.5 MILLIGRAM(S): 80 CAPSULE, EXTENDED RELEASE ORAL at 05:18

## 2017-05-22 RX ADMIN — OXYCODONE HYDROCHLORIDE 10 MILLIGRAM(S): 5 TABLET ORAL at 15:54

## 2017-05-22 RX ADMIN — HYDROMORPHONE HYDROCHLORIDE 0.5 MILLIGRAM(S): 2 INJECTION INTRAMUSCULAR; INTRAVENOUS; SUBCUTANEOUS at 22:29

## 2017-05-22 RX ADMIN — ENOXAPARIN SODIUM 40 MILLIGRAM(S): 100 INJECTION SUBCUTANEOUS at 11:48

## 2017-05-22 RX ADMIN — Medication 100 MILLIGRAM(S): at 11:48

## 2017-05-22 RX ADMIN — OXYCODONE HYDROCHLORIDE 10 MILLIGRAM(S): 5 TABLET ORAL at 13:00

## 2017-05-22 RX ADMIN — HYDROMORPHONE HYDROCHLORIDE 0.5 MILLIGRAM(S): 2 INJECTION INTRAMUSCULAR; INTRAVENOUS; SUBCUTANEOUS at 14:54

## 2017-05-22 RX ADMIN — OXYCODONE HYDROCHLORIDE 15 MILLIGRAM(S): 5 TABLET ORAL at 04:13

## 2017-05-22 RX ADMIN — HYDROMORPHONE HYDROCHLORIDE 0.5 MILLIGRAM(S): 2 INJECTION INTRAMUSCULAR; INTRAVENOUS; SUBCUTANEOUS at 05:18

## 2017-05-22 RX ADMIN — OXYCODONE HYDROCHLORIDE 15 MILLIGRAM(S): 5 TABLET ORAL at 00:11

## 2017-05-22 RX ADMIN — CARVEDILOL PHOSPHATE 12.5 MILLIGRAM(S): 80 CAPSULE, EXTENDED RELEASE ORAL at 19:22

## 2017-05-22 RX ADMIN — PANTOPRAZOLE SODIUM 40 MILLIGRAM(S): 20 TABLET, DELAYED RELEASE ORAL at 05:18

## 2017-05-22 RX ADMIN — Medication 3 MILLILITER(S): at 03:47

## 2017-05-22 RX ADMIN — OXYCODONE HYDROCHLORIDE 15 MILLIGRAM(S): 5 TABLET ORAL at 04:45

## 2017-05-22 RX ADMIN — Medication 3 MILLILITER(S): at 21:59

## 2017-05-22 RX ADMIN — HYDROMORPHONE HYDROCHLORIDE 0.5 MILLIGRAM(S): 2 INJECTION INTRAMUSCULAR; INTRAVENOUS; SUBCUTANEOUS at 22:45

## 2017-05-22 RX ADMIN — Medication 90 MILLIGRAM(S): at 05:18

## 2017-05-22 RX ADMIN — HYDROMORPHONE HYDROCHLORIDE 0.5 MILLIGRAM(S): 2 INJECTION INTRAMUSCULAR; INTRAVENOUS; SUBCUTANEOUS at 12:16

## 2017-05-22 RX ADMIN — HYDROMORPHONE HYDROCHLORIDE 0.5 MILLIGRAM(S): 2 INJECTION INTRAMUSCULAR; INTRAVENOUS; SUBCUTANEOUS at 05:50

## 2017-05-22 RX ADMIN — LIDOCAINE 1 PATCH: 4 CREAM TOPICAL at 21:38

## 2017-05-22 RX ADMIN — OXYCODONE HYDROCHLORIDE 10 MILLIGRAM(S): 5 TABLET ORAL at 22:31

## 2017-05-22 RX ADMIN — Medication 40 MILLIEQUIVALENT(S): at 11:50

## 2017-05-22 RX ADMIN — Medication 3 MILLILITER(S): at 09:03

## 2017-05-22 RX ADMIN — OXYCODONE HYDROCHLORIDE 15 MILLIGRAM(S): 5 TABLET ORAL at 01:00

## 2017-05-22 RX ADMIN — Medication 3 MILLILITER(S): at 14:21

## 2017-05-22 RX ADMIN — OXYCODONE HYDROCHLORIDE 10 MILLIGRAM(S): 5 TABLET ORAL at 16:10

## 2017-05-22 RX ADMIN — LIDOCAINE 1 PATCH: 4 CREAM TOPICAL at 11:48

## 2017-05-22 RX ADMIN — OXYCODONE HYDROCHLORIDE 10 MILLIGRAM(S): 5 TABLET ORAL at 21:34

## 2017-05-22 NOTE — PROGRESS NOTE ADULT - PROBLEM SELECTOR PLAN 1
continue regular diet  pain management  encourage ambulation and ISS  lovenox for DVT prophylaxis  dispo planning with home VAC  F/U with psych for dispo recommendations

## 2017-05-22 NOTE — PROGRESS NOTE BEHAVIORAL HEALTH - NSBHCHARTREVIEWVS_PSY_A_CORE FT
Vital Signs Last 24 Hrs  T(C): 37.4, Max: 37.4 (05-19 @ 08:20)  T(F): 99.3, Max: 99.3 (05-19 @ 08:20)  HR: 74 (70 - 86)  BP: 130/64 (124/58 - 145/57)  BP(mean): --  RR: 19 (16 - 19)  SpO2: 93% (90% - 98%)
Vital Signs Last 24 Hrs  T(C): 37.4, Max: 37.4 (05-19 @ 08:20)  T(F): 99.3, Max: 99.3 (05-19 @ 08:20)  HR: 74 (70 - 86)  BP: 130/64 (124/58 - 145/57)  BP(mean): --  RR: 19 (16 - 19)  SpO2: 93% (90% - 98%)

## 2017-05-22 NOTE — PROGRESS NOTE ADULT - PROBLEM SELECTOR PROBLEM 1
SBO (small bowel obstruction)

## 2017-05-22 NOTE — PROGRESS NOTE ADULT - SUBJECTIVE AND OBJECTIVE BOX
pt seen and examined at bed side  no acute events overnight  tolerating diet  having bowel movement    MEDICATIONS  (STANDING):  enoxaparin Injectable 40milliGRAM(s) SubCutaneous daily  bisacodyl Suppository 10milliGRAM(s) Rectal daily  dextrose 50% Injectable 12.5Gram(s) IV Push once  carvedilol 12.5milliGRAM(s) Oral every 12 hours  NIFEdipine XL 90milliGRAM(s) Oral daily  docusate sodium 100milliGRAM(s) Oral three times a day  senna 2Tablet(s) Oral at bedtime  ALBUTerol/ipratropium for Nebulization 3milliLiter(s) Nebulizer every 6 hours  lidocaine   Patch 1Patch Transdermal daily  escitalopram 10milliGRAM(s) Oral daily  pantoprazole    Tablet 40milliGRAM(s) Oral before breakfast  potassium chloride    Tablet ER 40milliEquivalent(s) Oral once  magnesium sulfate  IVPB 2Gram(s) IV Intermittent once    MEDICATIONS  (PRN):  naloxone Injectable 0.1milliGRAM(s) IV Push every 3 minutes PRN For ANY of the following changes in patient status:  A. RR LESS THAN 10 breaths per minute, B. Oxygen saturation LESS THAN 90%, C. Sedation score of 6  ondansetron Injectable 4milliGRAM(s) IV Push every 6 hours PRN Nausea  acetaminophen   Tablet. 650milliGRAM(s) Oral every 6 hours PRN Mild Pain (1 - 3)  HYDROmorphone  Injectable 0.5milliGRAM(s) SubCutaneous every 3 hours PRN breakthrough pain  oxyCODONE IR 15milliGRAM(s) Oral every 4 hours PRN Severe Pain (7 - 10)  oxyCODONE IR 10milliGRAM(s) Oral every 4 hours PRN Moderate Pain (4 - 6)      Vital Signs Last 24 Hrs  T(C): 36.7, Max: 37.1 (05-21 @ 09:27)  T(F): 98, Max: 98.8 (05-21 @ 09:27)  HR: 65 (65 - 87)  BP: 149/68 (137/72 - 188/95)  BP(mean): --  RR: 18 (18 - 22)  SpO2: 95% (94% - 95%)    Constitutional: NAD, well-groomed, well-developed  HEENT: PERRLA, EOMI, no drainage or redness  Neck: No bruits; no thyromegaly or nodules,  No JVD  Back: tender to palpation on lower back, same as admission  Respiratory: Breath Sounds equal & clear to percussion & auscultation, no accessory muscle use  Cardiovascular: Regular rate & rhythm, normal S1, S2; no murmurs, gallops or rubs; no S3, S4  Gastrointestinal: Soft, tender around the incision site, VAC in place  Vascular: Equal and normal pulses: 2+ peripheral pulses throughout  Neurological: GCS: 15. A&O x 3; no sensory, motor or coordination deficits, normal reflexes  Psychiatric: Normal mood, normal affect  Musculoskeletal: No joint pain, swelling or deformity; no limitation of movement  Skin: No rashes      I&O's Detail    I & Os for current day (as of 22 May 2017 08:25)  =============================================  IN:    Oral Fluid: 360 ml    Total IN: 360 ml  ---------------------------------------------  OUT:    Total OUT: 0 ml  ---------------------------------------------  Total NET: 360 ml      LABS:                        7.6    6.7   )-----------( 273      ( 21 May 2017 08:09 )             24.1     05-22    138  |  96<L>  |  8.0  ----------------------------<  113  3.7   |  33.0<H>  |  0.43<L>    Ca    8.0<L>      22 May 2017 05:42  Phos  2.7     05-22  Mg     1.7     05-22            RADIOLOGY & ADDITIONAL STUDIES:

## 2017-05-22 NOTE — PROGRESS NOTE ADULT - ASSESSMENT
61 y/o F with SBO s/p exlap, HATTIE, small bowel resection
61 y/o female with SBO, POD#2 s/p exlap HATTIE and SBR. Hemodynamically well, pain better controlled today, no bowel function yet, preston and NGT remain in place
62F doing well s/p SBR, tolerating CLD
62F doing well s/p SBR, tolerating CLD
62F doing well s/p SBR, tolerating regular
62F s/p SBR for SBO, with increased oxygen requirements
62F s/p exploratory laparotomy, lysis of adhesions, and small bowel resection
63 y/o female with SBO, POD#3 s/p exlap HATTIE and SBR.
63 y/o female with SBO, POD#3 s/p exlap HATTIE and SBR.
63 y/o female with SBO, POD#3 s/p exlap HATTIE and SBR. Hemodynamically well, no bowel function yet, preston and NGT remain in place
POD#3  61 y/o female with chronic back pain, with SBO s/p: Exp Lap, SBR, HATTIE

## 2017-05-23 ENCOUNTER — TRANSCRIPTION ENCOUNTER (OUTPATIENT)
Age: 62
End: 2017-05-23

## 2017-05-23 VITALS
TEMPERATURE: 98 F | SYSTOLIC BLOOD PRESSURE: 138 MMHG | OXYGEN SATURATION: 98 % | HEART RATE: 74 BPM | DIASTOLIC BLOOD PRESSURE: 70 MMHG | RESPIRATION RATE: 20 BRPM

## 2017-05-23 LAB
ANION GAP SERPL CALC-SCNC: 9 MMOL/L — SIGNIFICANT CHANGE UP (ref 5–17)
BUN SERPL-MCNC: 7 MG/DL — LOW (ref 8–20)
CALCIUM SERPL-MCNC: 8.2 MG/DL — LOW (ref 8.6–10.2)
CHLORIDE SERPL-SCNC: 98 MMOL/L — SIGNIFICANT CHANGE UP (ref 98–107)
CO2 SERPL-SCNC: 31 MMOL/L — HIGH (ref 22–29)
CREAT SERPL-MCNC: 0.44 MG/DL — LOW (ref 0.5–1.3)
GLUCOSE SERPL-MCNC: 105 MG/DL — SIGNIFICANT CHANGE UP (ref 70–115)
MAGNESIUM SERPL-MCNC: 1.8 MG/DL — SIGNIFICANT CHANGE UP (ref 1.6–2.6)
PHOSPHATE SERPL-MCNC: 3.5 MG/DL — SIGNIFICANT CHANGE UP (ref 2.4–4.7)
POTASSIUM SERPL-MCNC: 4 MMOL/L — SIGNIFICANT CHANGE UP (ref 3.5–5.3)
POTASSIUM SERPL-SCNC: 4 MMOL/L — SIGNIFICANT CHANGE UP (ref 3.5–5.3)
SODIUM SERPL-SCNC: 138 MMOL/L — SIGNIFICANT CHANGE UP (ref 135–145)

## 2017-05-23 PROCEDURE — 99232 SBSQ HOSP IP/OBS MODERATE 35: CPT

## 2017-05-23 RX ORDER — ONDANSETRON 8 MG/1
4 TABLET, FILM COATED ORAL
Qty: 0 | Refills: 0 | COMMUNITY
Start: 2017-05-23

## 2017-05-23 RX ORDER — PANTOPRAZOLE SODIUM 20 MG/1
1 TABLET, DELAYED RELEASE ORAL
Qty: 0 | Refills: 0 | COMMUNITY
Start: 2017-05-23

## 2017-05-23 RX ORDER — OXYCODONE HYDROCHLORIDE 5 MG/1
1 TABLET ORAL
Qty: 0 | Refills: 0 | COMMUNITY
Start: 2017-05-23

## 2017-05-23 RX ORDER — DEXTROSE 50 % IN WATER 50 %
25 SYRINGE (ML) INTRAVENOUS
Qty: 0 | Refills: 0 | COMMUNITY
Start: 2017-05-23

## 2017-05-23 RX ORDER — ACETAMINOPHEN 500 MG
2 TABLET ORAL
Qty: 0 | Refills: 0 | COMMUNITY
Start: 2017-05-23

## 2017-05-23 RX ORDER — ESCITALOPRAM OXALATE 10 MG/1
3 TABLET, FILM COATED ORAL
Qty: 0 | Refills: 0 | COMMUNITY
Start: 2017-05-23

## 2017-05-23 RX ORDER — CARVEDILOL PHOSPHATE 80 MG/1
1 CAPSULE, EXTENDED RELEASE ORAL
Qty: 0 | Refills: 0 | COMMUNITY
Start: 2017-05-23

## 2017-05-23 RX ORDER — LIDOCAINE 4 G/100G
0 CREAM TOPICAL
Qty: 0 | Refills: 0 | COMMUNITY
Start: 2017-05-23

## 2017-05-23 RX ORDER — IPRATROPIUM/ALBUTEROL SULFATE 18-103MCG
3 AEROSOL WITH ADAPTER (GRAM) INHALATION
Qty: 0 | Refills: 0 | COMMUNITY
Start: 2017-05-23

## 2017-05-23 RX ORDER — NIFEDIPINE 30 MG
1 TABLET, EXTENDED RELEASE 24 HR ORAL
Qty: 0 | Refills: 0 | COMMUNITY

## 2017-05-23 RX ORDER — MAGNESIUM OXIDE 400 MG ORAL TABLET 241.3 MG
400 TABLET ORAL
Qty: 0 | Refills: 0 | Status: DISCONTINUED | OUTPATIENT
Start: 2017-05-23 | End: 2017-05-23

## 2017-05-23 RX ORDER — NIFEDIPINE 30 MG
1 TABLET, EXTENDED RELEASE 24 HR ORAL
Qty: 0 | Refills: 0 | COMMUNITY
Start: 2017-05-23

## 2017-05-23 RX ORDER — SENNA PLUS 8.6 MG/1
2 TABLET ORAL
Qty: 0 | Refills: 0 | COMMUNITY
Start: 2017-05-23

## 2017-05-23 RX ORDER — DOCUSATE SODIUM 100 MG
1 CAPSULE ORAL
Qty: 0 | Refills: 0 | COMMUNITY
Start: 2017-05-23

## 2017-05-23 RX ORDER — ENOXAPARIN SODIUM 100 MG/ML
40 INJECTION SUBCUTANEOUS
Qty: 0 | Refills: 0 | COMMUNITY
Start: 2017-05-23

## 2017-05-23 RX ADMIN — ENOXAPARIN SODIUM 40 MILLIGRAM(S): 100 INJECTION SUBCUTANEOUS at 13:43

## 2017-05-23 RX ADMIN — OXYCODONE HYDROCHLORIDE 15 MILLIGRAM(S): 5 TABLET ORAL at 16:51

## 2017-05-23 RX ADMIN — CARVEDILOL PHOSPHATE 12.5 MILLIGRAM(S): 80 CAPSULE, EXTENDED RELEASE ORAL at 17:55

## 2017-05-23 RX ADMIN — OXYCODONE HYDROCHLORIDE 15 MILLIGRAM(S): 5 TABLET ORAL at 14:45

## 2017-05-23 RX ADMIN — Medication 3 MILLILITER(S): at 03:03

## 2017-05-23 RX ADMIN — MAGNESIUM OXIDE 400 MG ORAL TABLET 400 MILLIGRAM(S): 241.3 TABLET ORAL at 10:49

## 2017-05-23 RX ADMIN — Medication 90 MILLIGRAM(S): at 06:38

## 2017-05-23 RX ADMIN — MAGNESIUM OXIDE 400 MG ORAL TABLET 400 MILLIGRAM(S): 241.3 TABLET ORAL at 17:55

## 2017-05-23 RX ADMIN — ESCITALOPRAM OXALATE 15 MILLIGRAM(S): 10 TABLET, FILM COATED ORAL at 13:44

## 2017-05-23 RX ADMIN — Medication 3 MILLILITER(S): at 15:20

## 2017-05-23 RX ADMIN — OXYCODONE HYDROCHLORIDE 10 MILLIGRAM(S): 5 TABLET ORAL at 06:33

## 2017-05-23 RX ADMIN — Medication 100 MILLIGRAM(S): at 13:44

## 2017-05-23 RX ADMIN — Medication 3 MILLILITER(S): at 09:36

## 2017-05-23 RX ADMIN — OXYCODONE HYDROCHLORIDE 15 MILLIGRAM(S): 5 TABLET ORAL at 11:02

## 2017-05-23 RX ADMIN — LIDOCAINE 1 PATCH: 4 CREAM TOPICAL at 16:50

## 2017-05-23 RX ADMIN — CARVEDILOL PHOSPHATE 12.5 MILLIGRAM(S): 80 CAPSULE, EXTENDED RELEASE ORAL at 06:38

## 2017-05-23 RX ADMIN — OXYCODONE HYDROCHLORIDE 15 MILLIGRAM(S): 5 TABLET ORAL at 11:45

## 2017-05-23 NOTE — PROGRESS NOTE BEHAVIORAL HEALTH - NSBHFUPINTERVALHXFT_PSY_A_CORE
Patient is a 53 year old female admitted to [facility] for severe back pain nausea and vomitting found to have SBO and now SBR, psychiatry consulted for suicidal ideation in the context of psychosocial stressors and chronic back pain. Patient was started on Lexapro today with no noted SE. She reports she continues to feel depressed in the context of psychosocial issues. She received a phone call from her daughters-in-law today reported she is homeless. Patient also has an adult son who abuses drugs and has financial issues. She reports her  is supportive but can be " controlling."  Patient denies thoughts of hurting herself or others however states, " If I didn't wake up I wouldn't be upset."
Patient stated "I never said I wanted to kill myself...I am angry".  She admits that she has been consistently feeling depressed.  She is reports she is upset about her physical condition and "angry at the world", but states " but I would never kill myself".  She reports she slept "a little bit" last night (2 hours). She states she is going to rehab and is nervous about it.  No psychotic or manic sx's were elicited.  Patient tolerated increase in medications.  Her sister, as bedside, notes patient has been depressed but has no safety concerns about patient.
Patient continues to verbalize suicidal ideation however denies plan. She was tearful during visit frequently stating " I want to die." She continues to report she is hopeless and helpless and " wants to give up."

## 2017-05-23 NOTE — PROGRESS NOTE BEHAVIORAL HEALTH - NSBHFUPSUICINTERVALFT_PSY_A_CORE
Reports she would notify staff I she were to developed active suicidal ideation.
Reports she would notify staff I she were to developed active suicidal ideation.
states, " I don't know if I would do anything."

## 2017-05-23 NOTE — DISCHARGE NOTE ADULT - PROVIDER TOKENS
FREE:[LAST:[[facility]],PHONE:[phone]],FAX:[(   [phone]    -],ADDRESS:[[address] [place] [address]    PLEASE CALL FOR APPOINTMENT]]

## 2017-05-23 NOTE — PROGRESS NOTE BEHAVIORAL HEALTH - RISK ASSESSMENT
high- patient hopeless, endorsing suicidality
Currently denies active suicidal ideation with plan or intent. She reports she is in agreement to [facility] with improved appetite and sleep.
Currently denies active suicidal ideation with plan or intent. She reports she is in agreement to [facility] with improved appetite and sleep.

## 2017-05-23 NOTE — PROGRESS NOTE BEHAVIORAL HEALTH - NSBHCONSULTPRIMARYDISCUSSNO_PSY_A_CORE FT
attempted to reach Dr Prabhakar. Spoke with discharge planning.
Will discuss
continue current treatment.

## 2017-05-23 NOTE — DISCHARGE NOTE ADULT - CARE PROVIDER_API CALL
[facility],   [address] [place] [id number]    PLEASE CALL FOR APPOINTMENT  Phone: [phone]  Fax: (   )    -

## 2017-05-23 NOTE — PROGRESS NOTE BEHAVIORAL HEALTH - NSBHATTESTSEENBY_PSY_A_CORE
NP with telephonic supervision from Attending Psychiatrist
NP with telephonic supervision from Attending Psychiatrist
NP without Attending Psychiatrist

## 2017-05-23 NOTE — PROGRESS NOTE BEHAVIORAL HEALTH - NSBHCONSULTMEDS_PSY_A_CORE FT
Lexapro 10 mg daily consider increasing to 15mg daily.
Lexapro 10 mg daily consider increasing to 15mg daily.
Lexapro 10 mg daily

## 2017-05-23 NOTE — PROGRESS NOTE BEHAVIORAL HEALTH - PRIMARY DX
Adjustment disorder with depressed mood

## 2017-05-23 NOTE — PROGRESS NOTE BEHAVIORAL HEALTH - NSBHCONSULTOBSREASON_PSY_A_CORE FT
denies active suicidal ideation, plan or intent.
denies active suicidal ideation, plan or intent.
suicidal ideation

## 2017-05-23 NOTE — PROGRESS NOTE BEHAVIORAL HEALTH - NSBHCONSULTFOLLOWDETAILS_PSY_A_CORE FT
Discussed benefits for inpatient pychiatric hospitalization. Patient stated , " I am not going to hurt myself and I want to go to physical rehab."  With patients permission spoke with  [phone] who reports over the past several days his wife appears to have "less depressed , and is more focused on improving." He denies patient has verbalized suicidal ideation, intent or plan and does not feel she is a suicide risk.
Patient continues to be depressed, but no S/H I/I/P.
May require inpatient hospitalization once medically cleared if she remains suicidal.

## 2017-05-23 NOTE — PROGRESS NOTE BEHAVIORAL HEALTH - SUMMARY
Patient is endorsing hopelessness, suicidality without plan in relation to medical issues, immobility; and guilt in relation to past trauma.    recommend:  1) start lexapro 10mg daily  2) continue 1:1 supervision  3) psych will follow
Patient is endorsing hopelessness, suicidality without plan in relation to medical issues, immobility; and guilt in relation to past trauma.    recommend:  1) Increase lexapro 15mg daily  2) Discontinue constant observation  3) psych will follow
Patient is endorsing hopelessness, denies any active suicidal ideation/intnet or plan.  and guilt in relation to past trauma.    recommend:  1) Continue lexapro 15mg daily  2) Will continue to follow as needed

## 2017-05-23 NOTE — DISCHARGE NOTE ADULT - PATIENT PORTAL LINK FT
“You can access the Follow[facility] Patient Portal, offered by [facility], by registering with the following website: [email or web address]”

## 2017-05-23 NOTE — PROGRESS NOTE BEHAVIORAL HEALTH - NSBHCONSULTRECOMMENDOTHER_PSY_A_CORE FT
Patient is not psychiatrically cleared. Will require follow-up prior to discharge.
Currently psychiatrically cleared
none

## 2017-05-23 NOTE — PROGRESS NOTE ADULT - SUBJECTIVE AND OBJECTIVE BOX
patient seen and examined in AM  no acute issues overnight  patient states feeling well with no abdominal pain  denies N/V; tolerating regular diet and having bowel function  patient was reevaluated by psych - cleared to discharge from their standpoint, no need for 1:1    PE:  V/S: Tm 99, Tc 98.0, HR: 82, BP: 136/72, R: 18, SaO2: 96%  G/A: NAD  Abdomen: soft, non tender and non distended; VAC in place with good seal    A/P: 61 y/o F POD#11 s/p ex-lap, HATTIE and SBR for SBO.  -continue regular diet  -incentive spirometry  -oob  -d/c planning to [facility] as per PT recommendations

## 2017-05-23 NOTE — DISCHARGE NOTE ADULT - MEDICATION SUMMARY - MEDICATIONS TO TAKE
I will START or STAY ON the medications listed below when I get home from the hospital:    acetaminophen 325 mg oral tablet  -- 2 tab(s) by mouth every 6 hours, As needed, Mild Pain (1 - 3)  -- Indication: For Pain    oxyCODONE 15 mg oral tablet  -- 1 tab(s) by mouth every 4 hours, As needed, Severe Pain (7 - 10)  -- Indication: For  Pain     oxyCODONE 10 mg oral tablet  -- 1 tab(s) by mouth every 4 hours, As needed, Moderate Pain (4 - 6)  -- Indication: For Pain     enoxaparin  -- 40 milligram(s) subcutaneously once a day  -- Indication: For DVT Prophylaxis     escitalopram 5 mg oral tablet  -- 3 tab(s) by mouth once a day  -- Indication: For Depression due to physical illness    ondansetron 2 mg/mL injectable solution  -- 4 milligram(s) injectable every 6 hours  -- Indication: For Nausea     carvedilol 12.5 mg oral tablet  -- 1 tab(s) by mouth every 12 hours  -- Indication: For HTN    albuterol-ipratropium 2.5 mg-0.5 mg/3 mL inhalation solution  -- 3 milliliter(s) inhaled every 6 hours  -- Indication: For Wheezing     NIFEdipine 90 mg oral tablet, extended release  -- 1 tab(s) by mouth once a day  -- Indication: For HTN    lidocaine 5% topical film  --  on skin   -- Indication: For PAIN    glucose 50% intravenous solution  -- 25 milliliter(s) intravenous once  -- Indication: For Hypoglycemia     bisacodyl 10 mg rectal suppository  -- 1 suppository(ies) rectally once a day  -- Indication: For Constipation    docusate sodium 100 mg oral capsule  -- 1 cap(s) by mouth 3 times a day  -- Indication: For Constipation    senna oral tablet  -- 2 tab(s) by mouth once a day (at bedtime)  -- Indication: For Constipation    pantoprazole 40 mg oral delayed release tablet  -- 1 tab(s) by mouth once a day (before a meal)  -- Indication: For GERD

## 2017-05-23 NOTE — DISCHARGE NOTE ADULT - HOSPITAL COURSE
63 y/o F with chronic back pain presented to ED with complaints of back pain, radiating to the front, dull aching and colicky in type, 10/10 on intensity a/w nausea and multiple episode of greenish yellow vomiting since yesterday after getting a epidural injection for her chronic back pain. She never had abdominal pain before but her family endorses episodes of "obstruction."   She had her last bowel movement yesterday.  Her last meal was lunch.    She had a accident few years back after which she is having severe pain in his back. She had multiple surgery of her back and taken 3 epidural injection in the past few months.  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 Edema in the mesentery and free fluid in the pelvis. There is a twist in the mesentery suspicious for closed loop obstruction.   Patient taken to the OR 5/12/17 for ex-lap with SBR, operative findings consisted of extensive dense adhesions, friable point of obstruction in mid SB, spillage of SB contents, normal appearing colon. There were TORY during or after surgery, patient was extubated to the PACU and then went to floor bed. Patient was monitored for bowel function, diet toleration, pain control and vital signs and blood chemistry monitoring, she was seen by physical therapy but had trouble working with them due to chronic back pain, they recommended [facility] placement. Patient also expressed suicidal ideations, she was seen by psychiatry, they found that she had depression due to her physical state, her lexapro was increased to 15mg daily and she was cleared for discharge to [facility] from a psychiatric and physical standpoint. She has a wound vac placed in her midline incision dimensions of [id number], black granufoam. She is now stable for discharge to [facility], verbal and written instructions have been explained to the patient and she is showing verbal understanding.

## 2017-05-23 NOTE — DISCHARGE NOTE ADULT - NS AS ACTIVITY OBS
Walking-Indoors allowed/Showering allowed/Do not drive or operate machinery/Walking-Outdoors allowed/Stairs allowed/No Heavy lifting/straining

## 2017-05-23 NOTE — DISCHARGE NOTE ADULT - CARE PLAN
Principal Discharge DX:	SBO (small bowel obstruction)  Goal:	Alleviation of symptoms  Instructions for follow-up, activity and diet:	Follow up: Please call and make an appointment with Acute Care Surgery for 2 weeks after discharge. Also, please call and make an appointment with your primary care physician as per your usual schedule.   Activity: Do not lift anything heavier than 15 lbs. for 4-6 weeks following surgery. Work with physical therapy daily.    Diet: May continue regular diet.  Medications: Please take all medications as prescribed. Do not drive or operate heavy machinery while taking narcotics.   Wound Care: Please, keep wound site clean and dry. You may shower, but do not bathe. Wound vac in place, will be changed every 3 days by staff at rehab center. Dimensions [id number], black granufoam.   If confusion, altered mental status, fever, chest pain, shortness of breath, new or worsening abdominal pain, vomiting, diarrhea, bright red blood per rectum, change or worsening of medical status, please come back to the emergency room, and in case of emergency call 911. Principal Discharge DX:	SBO (small bowel obstruction)  Goal:	Alleviation of symptoms  Instructions for follow-up, activity and diet:	Follow up: Please call and make an appointment with Acute Care Surgery for 2 weeks after discharge. Also, please call and make an appointment with your primary care physician as per your usual schedule.   Activity: Do not lift anything heavier than 15 lbs. for 4-6 weeks following surgery. Work with physical therapy daily.    Diet: May continue regular diet.  Medications: Please take all medications as prescribed. Do not drive or operate heavy machinery while taking narcotics.   Wound Care: Please, keep wound site clean and dry. You may shower, but do not bathe. Wound vac in place, will be changed every 3 days by staff at rehab center. Dimensions [id number], black granufoam.   If confusion, altered mental status, fever, chest pain, shortness of breath, new or worsening abdominal pain, vomiting, diarrhea, bright red blood per rectum, change or worsening of medical status, please come back to the emergency room, and in case of emergency call 911. Principal Discharge DX:	SBO (small bowel obstruction)  Goal:	Alleviation of symptoms  Instructions for follow-up, activity and diet:	Follow up: Please call and make an appointment with Acute Care Surgery for 2 weeks after discharge. Also, please call and make an appointment with your primary care physician as per your usual schedule.   Activity: Do not lift anything heavier than 15 lbs. for 4-6 weeks following surgery. Work with physical therapy daily.    Diet: May continue regular diet.  Medications: Please take all medications as prescribed. Do not drive or operate heavy machinery while taking narcotics.   Wound Care: Please, keep wound site clean and dry. You may shower, but do not bathe. Wound vac in place, will be changed every 3 days by staff at rehab center. Dimensions [id number], black granufoam.   If confusion, altered mental status, fever, chest pain, shortness of breath, new or worsening abdominal pain, vomiting, diarrhea, bright red blood per rectum, change or worsening of medical status, please come back to the emergency room, and in case of emergency call 911.

## 2017-05-23 NOTE — PROGRESS NOTE BEHAVIORAL HEALTH - NSBHCONSFOLLOWNEEDS_PSY_A_CORE
Patient needs further psychiatric safety assessment prior to discharge
no psychiatric contraindications to discharge
Patient needs further psychiatric safety assessment prior to discharge

## 2017-05-23 NOTE — DISCHARGE NOTE ADULT - PLAN OF CARE
Alleviation of symptoms Follow up: Please call and make an appointment with Acute Care Surgery for 2 weeks after discharge. Also, please call and make an appointment with your primary care physician as per your usual schedule.   Activity: Do not lift anything heavier than 15 lbs. for 4-6 weeks following surgery. Work with physical therapy daily.    Diet: May continue regular diet.  Medications: Please take all medications as prescribed. Do not drive or operate heavy machinery while taking narcotics.   Wound Care: Please, keep wound site clean and dry. You may shower, but do not bathe. Wound vac in place, will be changed every 3 days by staff at rehab center. Dimensions [id number], black granufoam.   If confusion, altered mental status, fever, chest pain, shortness of breath, new or worsening abdominal pain, vomiting, diarrhea, bright red blood per rectum, change or worsening of medical status, please come back to the emergency room, and in case of emergency call 911.

## 2017-06-01 ENCOUNTER — APPOINTMENT (OUTPATIENT)
Dept: TRAUMA SURGERY | Facility: CLINIC | Age: 62
End: 2017-06-01

## 2017-06-01 VITALS
SYSTOLIC BLOOD PRESSURE: 150 MMHG | RESPIRATION RATE: 16 BRPM | TEMPERATURE: 98.4 F | OXYGEN SATURATION: 97 % | DIASTOLIC BLOOD PRESSURE: 86 MMHG | HEART RATE: 64 BPM

## 2017-06-14 ENCOUNTER — APPOINTMENT (OUTPATIENT)
Dept: SURGERY | Facility: CLINIC | Age: 62
End: 2017-06-14

## 2017-06-14 VITALS
SYSTOLIC BLOOD PRESSURE: 136 MMHG | OXYGEN SATURATION: 98 % | TEMPERATURE: 97.8 F | RESPIRATION RATE: 16 BRPM | HEART RATE: 56 BPM | DIASTOLIC BLOOD PRESSURE: 75 MMHG

## 2017-06-28 ENCOUNTER — APPOINTMENT (OUTPATIENT)
Dept: SURGERY | Facility: CLINIC | Age: 62
End: 2017-06-28

## 2017-06-28 VITALS
TEMPERATURE: 98 F | RESPIRATION RATE: 16 BRPM | SYSTOLIC BLOOD PRESSURE: 118 MMHG | DIASTOLIC BLOOD PRESSURE: 79 MMHG | OXYGEN SATURATION: 99 % | HEART RATE: 61 BPM

## 2017-06-30 DIAGNOSIS — M46.1 SACROILIITIS, NOT ELSEWHERE CLASSIFIED: ICD-10-CM

## 2017-07-05 ENCOUNTER — APPOINTMENT (OUTPATIENT)
Dept: GASTROENTEROLOGY | Facility: CLINIC | Age: 62
End: 2017-07-05

## 2017-07-05 VITALS
BODY MASS INDEX: 30.43 KG/M2 | HEART RATE: 62 BPM | SYSTOLIC BLOOD PRESSURE: 164 MMHG | RESPIRATION RATE: 18 BRPM | WEIGHT: 155 LBS | DIASTOLIC BLOOD PRESSURE: 88 MMHG | HEIGHT: 60 IN

## 2017-07-05 DIAGNOSIS — K59.00 CONSTIPATION, UNSPECIFIED: ICD-10-CM

## 2017-07-05 RX ORDER — OXYCODONE 10 MG/1
10 TABLET ORAL
Refills: 0 | Status: ACTIVE | COMMUNITY

## 2017-07-06 ENCOUNTER — OUTPATIENT (OUTPATIENT)
Dept: OUTPATIENT SERVICES | Facility: HOSPITAL | Age: 62
LOS: 1 days | End: 2017-07-06
Payer: MEDICARE

## 2017-07-06 ENCOUNTER — APPOINTMENT (OUTPATIENT)
Dept: CT IMAGING | Facility: CLINIC | Age: 62
End: 2017-07-06

## 2017-07-06 DIAGNOSIS — Z98.890 OTHER SPECIFIED POSTPROCEDURAL STATES: Chronic | ICD-10-CM

## 2017-07-06 DIAGNOSIS — Z90.710 ACQUIRED ABSENCE OF BOTH CERVIX AND UTERUS: Chronic | ICD-10-CM

## 2017-07-06 DIAGNOSIS — K56.5 INTESTINAL ADHESIONS [BANDS] WITH OBSTRUCTION (POSTINFECTION): ICD-10-CM

## 2017-07-06 DIAGNOSIS — Z95.0 PRESENCE OF CARDIAC PACEMAKER: Chronic | ICD-10-CM

## 2017-07-06 DIAGNOSIS — Z98.89 OTHER SPECIFIED POSTPROCEDURAL STATES: Chronic | ICD-10-CM

## 2017-07-06 PROCEDURE — 74177 CT ABD & PELVIS W/CONTRAST: CPT

## 2017-07-12 ENCOUNTER — APPOINTMENT (OUTPATIENT)
Dept: SURGERY | Facility: CLINIC | Age: 62
End: 2017-07-12

## 2017-07-12 VITALS
RESPIRATION RATE: 15 BRPM | SYSTOLIC BLOOD PRESSURE: 159 MMHG | HEIGHT: 60 IN | OXYGEN SATURATION: 100 % | TEMPERATURE: 98.7 F | HEART RATE: 71 BPM | DIASTOLIC BLOOD PRESSURE: 77 MMHG

## 2017-07-12 DIAGNOSIS — R11.0 NAUSEA: ICD-10-CM

## 2017-07-12 DIAGNOSIS — F15.90 OTHER STIMULANT USE, UNSPECIFIED, UNCOMPLICATED: ICD-10-CM

## 2017-07-12 DIAGNOSIS — Z91.89 OTHER SPECIFIED PERSONAL RISK FACTORS, NOT ELSEWHERE CLASSIFIED: ICD-10-CM

## 2017-07-13 PROBLEM — F15.90 CAFFEINE USE: Status: ACTIVE | Noted: 2017-07-05

## 2017-07-13 PROBLEM — R11.0 NAUSEA: Status: ACTIVE | Noted: 2017-06-28

## 2017-07-13 PROBLEM — Z91.89 SEDENTARY LIFESTYLE: Status: ACTIVE | Noted: 2017-07-05

## 2017-07-26 ENCOUNTER — APPOINTMENT (OUTPATIENT)
Dept: SURGERY | Facility: CLINIC | Age: 62
End: 2017-07-26

## 2017-07-26 VITALS
DIASTOLIC BLOOD PRESSURE: 78 MMHG | RESPIRATION RATE: 15 BRPM | HEIGHT: 60 IN | SYSTOLIC BLOOD PRESSURE: 171 MMHG | HEART RATE: 87 BPM | TEMPERATURE: 98.2 F | OXYGEN SATURATION: 100 %

## 2017-08-16 ENCOUNTER — APPOINTMENT (OUTPATIENT)
Dept: SURGERY | Facility: CLINIC | Age: 62
End: 2017-08-16
Payer: MEDICARE

## 2017-08-16 VITALS
HEART RATE: 73 BPM | OXYGEN SATURATION: 100 % | DIASTOLIC BLOOD PRESSURE: 74 MMHG | HEIGHT: 60 IN | RESPIRATION RATE: 15 BRPM | TEMPERATURE: 98.1 F | SYSTOLIC BLOOD PRESSURE: 199 MMHG

## 2017-08-16 DIAGNOSIS — Z98.890 OTHER SPECIFIED POSTPROCEDURAL STATES: ICD-10-CM

## 2017-08-16 DIAGNOSIS — K56.5 INTESTINAL ADHESIONS [BANDS] WITH OBSTRUCTION (POSTPROCEDURAL) (POSTINFECTION): ICD-10-CM

## 2017-08-16 PROCEDURE — 99213 OFFICE O/P EST LOW 20 MIN: CPT

## 2017-09-06 ENCOUNTER — APPOINTMENT (OUTPATIENT)
Dept: SURGERY | Facility: CLINIC | Age: 62
End: 2017-09-06

## 2017-10-03 ENCOUNTER — TRANSCRIPTION ENCOUNTER (OUTPATIENT)
Age: 62
End: 2017-10-03

## 2017-10-03 ENCOUNTER — OUTPATIENT (OUTPATIENT)
Dept: OUTPATIENT SERVICES | Facility: HOSPITAL | Age: 62
LOS: 1 days | End: 2017-10-03
Payer: MEDICARE

## 2017-10-03 DIAGNOSIS — Z98.89 OTHER SPECIFIED POSTPROCEDURAL STATES: Chronic | ICD-10-CM

## 2017-10-03 DIAGNOSIS — Z98.890 OTHER SPECIFIED POSTPROCEDURAL STATES: Chronic | ICD-10-CM

## 2017-10-03 DIAGNOSIS — Z95.0 PRESENCE OF CARDIAC PACEMAKER: Chronic | ICD-10-CM

## 2017-10-03 DIAGNOSIS — M47.816 SPONDYLOSIS WITHOUT MYELOPATHY OR RADICULOPATHY, LUMBAR REGION: ICD-10-CM

## 2017-10-03 DIAGNOSIS — Z90.710 ACQUIRED ABSENCE OF BOTH CERVIX AND UTERUS: Chronic | ICD-10-CM

## 2017-10-03 PROCEDURE — 64493 INJ PARAVERT F JNT L/S 1 LEV: CPT | Mod: 50

## 2017-10-03 PROCEDURE — 76000 FLUOROSCOPY <1 HR PHYS/QHP: CPT

## 2017-10-11 PROCEDURE — 82947 ASSAY GLUCOSE BLOOD QUANT: CPT

## 2017-10-11 PROCEDURE — 80053 COMPREHEN METABOLIC PANEL: CPT

## 2017-10-11 PROCEDURE — 88307 TISSUE EXAM BY PATHOLOGIST: CPT

## 2017-10-11 PROCEDURE — 76000 FLUOROSCOPY <1 HR PHYS/QHP: CPT

## 2017-10-11 PROCEDURE — 83735 ASSAY OF MAGNESIUM: CPT

## 2017-10-11 PROCEDURE — 84295 ASSAY OF SERUM SODIUM: CPT

## 2017-10-11 PROCEDURE — 85610 PROTHROMBIN TIME: CPT

## 2017-10-11 PROCEDURE — 85730 THROMBOPLASTIN TIME PARTIAL: CPT

## 2017-10-11 PROCEDURE — 80048 BASIC METABOLIC PNL TOTAL CA: CPT

## 2017-10-11 PROCEDURE — 85014 HEMATOCRIT: CPT

## 2017-10-11 PROCEDURE — 83605 ASSAY OF LACTIC ACID: CPT

## 2017-10-11 PROCEDURE — 85027 COMPLETE CBC AUTOMATED: CPT

## 2017-10-11 PROCEDURE — 84132 ASSAY OF SERUM POTASSIUM: CPT

## 2017-10-11 PROCEDURE — 36415 COLL VENOUS BLD VENIPUNCTURE: CPT

## 2017-10-11 PROCEDURE — 93005 ELECTROCARDIOGRAM TRACING: CPT

## 2017-10-11 PROCEDURE — 97116 GAIT TRAINING THERAPY: CPT

## 2017-10-11 PROCEDURE — 82803 BLOOD GASES ANY COMBINATION: CPT

## 2017-10-11 PROCEDURE — 94640 AIRWAY INHALATION TREATMENT: CPT

## 2017-10-11 PROCEDURE — 74177 CT ABD & PELVIS W/CONTRAST: CPT

## 2017-10-11 PROCEDURE — 99285 EMERGENCY DEPT VISIT HI MDM: CPT | Mod: 25

## 2017-10-11 PROCEDURE — 96375 TX/PRO/DX INJ NEW DRUG ADDON: CPT

## 2017-10-11 PROCEDURE — 96374 THER/PROPH/DIAG INJ IV PUSH: CPT | Mod: XU

## 2017-10-11 PROCEDURE — 97167 OT EVAL HIGH COMPLEX 60 MIN: CPT

## 2017-10-11 PROCEDURE — 94760 N-INVAS EAR/PLS OXIMETRY 1: CPT

## 2017-10-11 PROCEDURE — 84702 CHORIONIC GONADOTROPIN TEST: CPT

## 2017-10-11 PROCEDURE — 82330 ASSAY OF CALCIUM: CPT

## 2017-10-11 PROCEDURE — 97530 THERAPEUTIC ACTIVITIES: CPT

## 2017-10-11 PROCEDURE — 84100 ASSAY OF PHOSPHORUS: CPT

## 2017-10-11 PROCEDURE — 36600 WITHDRAWAL OF ARTERIAL BLOOD: CPT

## 2017-10-11 PROCEDURE — 97163 PT EVAL HIGH COMPLEX 45 MIN: CPT

## 2017-10-11 PROCEDURE — 86900 BLOOD TYPING SEROLOGIC ABO: CPT

## 2017-10-11 PROCEDURE — 93970 EXTREMITY STUDY: CPT

## 2017-10-11 PROCEDURE — 71045 X-RAY EXAM CHEST 1 VIEW: CPT

## 2017-10-11 PROCEDURE — 86901 BLOOD TYPING SEROLOGIC RH(D): CPT

## 2017-10-11 PROCEDURE — 82435 ASSAY OF BLOOD CHLORIDE: CPT

## 2017-10-11 PROCEDURE — 86850 RBC ANTIBODY SCREEN: CPT

## 2017-11-07 ENCOUNTER — OUTPATIENT (OUTPATIENT)
Dept: OUTPATIENT SERVICES | Facility: HOSPITAL | Age: 62
LOS: 1 days | End: 2017-11-07
Payer: MEDICARE

## 2017-11-07 ENCOUNTER — TRANSCRIPTION ENCOUNTER (OUTPATIENT)
Age: 62
End: 2017-11-07

## 2017-11-07 DIAGNOSIS — Z98.89 OTHER SPECIFIED POSTPROCEDURAL STATES: Chronic | ICD-10-CM

## 2017-11-07 DIAGNOSIS — Z98.890 OTHER SPECIFIED POSTPROCEDURAL STATES: Chronic | ICD-10-CM

## 2017-11-07 DIAGNOSIS — Z95.0 PRESENCE OF CARDIAC PACEMAKER: Chronic | ICD-10-CM

## 2017-11-07 DIAGNOSIS — Z90.710 ACQUIRED ABSENCE OF BOTH CERVIX AND UTERUS: Chronic | ICD-10-CM

## 2017-11-07 DIAGNOSIS — M47.816 SPONDYLOSIS WITHOUT MYELOPATHY OR RADICULOPATHY, LUMBAR REGION: ICD-10-CM

## 2017-11-07 PROCEDURE — 64493 INJ PARAVERT F JNT L/S 1 LEV: CPT

## 2017-11-07 PROCEDURE — 64494 INJ PARAVERT F JNT L/S 2 LEV: CPT

## 2017-11-07 PROCEDURE — 76000 FLUOROSCOPY <1 HR PHYS/QHP: CPT

## 2018-04-09 ENCOUNTER — INPATIENT (INPATIENT)
Facility: HOSPITAL | Age: 63
LOS: 6 days | Discharge: HOME | DRG: 194 | End: 2018-04-16
Attending: INTERNAL MEDICINE | Admitting: HOSPITALIST
Payer: MEDICARE

## 2018-04-09 VITALS
TEMPERATURE: 98 F | RESPIRATION RATE: 18 BRPM | SYSTOLIC BLOOD PRESSURE: 170 MMHG | HEART RATE: 82 BPM | DIASTOLIC BLOOD PRESSURE: 100 MMHG | HEIGHT: 63 IN | WEIGHT: 190.04 LBS | OXYGEN SATURATION: 98 %

## 2018-04-09 DIAGNOSIS — Z98.890 OTHER SPECIFIED POSTPROCEDURAL STATES: Chronic | ICD-10-CM

## 2018-04-09 DIAGNOSIS — Z90.710 ACQUIRED ABSENCE OF BOTH CERVIX AND UTERUS: Chronic | ICD-10-CM

## 2018-04-09 DIAGNOSIS — I10 ESSENTIAL (PRIMARY) HYPERTENSION: ICD-10-CM

## 2018-04-09 DIAGNOSIS — R11.2 NAUSEA WITH VOMITING, UNSPECIFIED: ICD-10-CM

## 2018-04-09 DIAGNOSIS — J18.1 LOBAR PNEUMONIA, UNSPECIFIED ORGANISM: ICD-10-CM

## 2018-04-09 DIAGNOSIS — D72.829 ELEVATED WHITE BLOOD CELL COUNT, UNSPECIFIED: ICD-10-CM

## 2018-04-09 DIAGNOSIS — Z95.0 PRESENCE OF CARDIAC PACEMAKER: Chronic | ICD-10-CM

## 2018-04-09 DIAGNOSIS — Z90.49 ACQUIRED ABSENCE OF OTHER SPECIFIED PARTS OF DIGESTIVE TRACT: Chronic | ICD-10-CM

## 2018-04-09 DIAGNOSIS — Z98.89 OTHER SPECIFIED POSTPROCEDURAL STATES: Chronic | ICD-10-CM

## 2018-04-09 DIAGNOSIS — R10.9 UNSPECIFIED ABDOMINAL PAIN: ICD-10-CM

## 2018-04-09 LAB
ALBUMIN SERPL ELPH-MCNC: 3.8 G/DL — SIGNIFICANT CHANGE UP (ref 3.3–5.2)
ALP SERPL-CCNC: 112 U/L — SIGNIFICANT CHANGE UP (ref 40–120)
ALT FLD-CCNC: 42 U/L — HIGH
ANION GAP SERPL CALC-SCNC: 13 MMOL/L — SIGNIFICANT CHANGE UP (ref 5–17)
ANISOCYTOSIS BLD QL: SLIGHT — SIGNIFICANT CHANGE UP
APPEARANCE UR: CLEAR — SIGNIFICANT CHANGE UP
AST SERPL-CCNC: 55 U/L — HIGH
BACTERIA # UR AUTO: ABNORMAL
BASOPHILS # BLD AUTO: 0 K/UL — SIGNIFICANT CHANGE UP (ref 0–0.2)
BASOPHILS NFR BLD AUTO: 0.1 % — SIGNIFICANT CHANGE UP (ref 0–2)
BILIRUB SERPL-MCNC: 1 MG/DL — SIGNIFICANT CHANGE UP (ref 0.4–2)
BILIRUB UR-MCNC: NEGATIVE — SIGNIFICANT CHANGE UP
BLD GP AB SCN SERPL QL: SIGNIFICANT CHANGE UP
BUN SERPL-MCNC: 21 MG/DL — HIGH (ref 8–20)
CALCIUM SERPL-MCNC: 8.7 MG/DL — SIGNIFICANT CHANGE UP (ref 8.6–10.2)
CHLORIDE SERPL-SCNC: 103 MMOL/L — SIGNIFICANT CHANGE UP (ref 98–107)
CO2 SERPL-SCNC: 26 MMOL/L — SIGNIFICANT CHANGE UP (ref 22–29)
COLOR SPEC: YELLOW — SIGNIFICANT CHANGE UP
CREAT SERPL-MCNC: 0.75 MG/DL — SIGNIFICANT CHANGE UP (ref 0.5–1.3)
DIFF PNL FLD: ABNORMAL
ELLIPTOCYTES BLD QL SMEAR: SLIGHT — SIGNIFICANT CHANGE UP
EOSINOPHIL # BLD AUTO: 0 K/UL — SIGNIFICANT CHANGE UP (ref 0–0.5)
EOSINOPHIL NFR BLD AUTO: 0.1 % — SIGNIFICANT CHANGE UP (ref 0–5)
EPI CELLS # UR: SIGNIFICANT CHANGE UP
GLUCOSE SERPL-MCNC: 95 MG/DL — SIGNIFICANT CHANGE UP (ref 70–115)
GLUCOSE UR QL: NEGATIVE MG/DL — SIGNIFICANT CHANGE UP
HCT VFR BLD CALC: 33.7 % — LOW (ref 37–47)
HGB BLD-MCNC: 10.3 G/DL — LOW (ref 12–16)
HYALINE CASTS # UR AUTO: ABNORMAL /LPF
HYPOCHROMIA BLD QL: SLIGHT — SIGNIFICANT CHANGE UP
INR BLD: 1.14 RATIO — SIGNIFICANT CHANGE UP (ref 0.88–1.16)
KETONES UR-MCNC: NEGATIVE — SIGNIFICANT CHANGE UP
LEUKOCYTE ESTERASE UR-ACNC: ABNORMAL
LIDOCAIN IGE QN: 7 U/L — LOW (ref 22–51)
LYMPHOCYTES # BLD AUTO: 1.8 K/UL — SIGNIFICANT CHANGE UP (ref 1–4.8)
LYMPHOCYTES # BLD AUTO: 9.5 % — LOW (ref 20–55)
MCHC RBC-ENTMCNC: 19.6 PG — LOW (ref 27–31)
MCHC RBC-ENTMCNC: 30.6 G/DL — LOW (ref 32–36)
MCV RBC AUTO: 64.2 FL — LOW (ref 81–99)
MICROCYTES BLD QL: SLIGHT — SIGNIFICANT CHANGE UP
MONOCYTES # BLD AUTO: 1.1 K/UL — HIGH (ref 0–0.8)
MONOCYTES NFR BLD AUTO: 5.5 % — SIGNIFICANT CHANGE UP (ref 3–10)
NEUTROPHILS # BLD AUTO: 16.4 K/UL — HIGH (ref 1.8–8)
NEUTROPHILS NFR BLD AUTO: 84.5 % — HIGH (ref 37–73)
NITRITE UR-MCNC: NEGATIVE — SIGNIFICANT CHANGE UP
OVALOCYTES BLD QL SMEAR: SLIGHT — SIGNIFICANT CHANGE UP
PH UR: 5 — SIGNIFICANT CHANGE UP (ref 5–8)
PLAT MORPH BLD: NORMAL — SIGNIFICANT CHANGE UP
PLATELET # BLD AUTO: 233 K/UL — SIGNIFICANT CHANGE UP (ref 150–400)
POIKILOCYTOSIS BLD QL AUTO: SLIGHT — SIGNIFICANT CHANGE UP
POTASSIUM SERPL-MCNC: 3.8 MMOL/L — SIGNIFICANT CHANGE UP (ref 3.5–5.3)
POTASSIUM SERPL-SCNC: 3.8 MMOL/L — SIGNIFICANT CHANGE UP (ref 3.5–5.3)
PROT SERPL-MCNC: 6.6 G/DL — SIGNIFICANT CHANGE UP (ref 6.6–8.7)
PROT UR-MCNC: 30 MG/DL
PROTHROM AB SERPL-ACNC: 12.6 SEC — SIGNIFICANT CHANGE UP (ref 9.8–12.7)
RBC # BLD: 5.25 M/UL — HIGH (ref 4.4–5.2)
RBC # FLD: 16.3 % — HIGH (ref 11–15.6)
RBC BLD AUTO: ABNORMAL
RBC CASTS # UR COMP ASSIST: SIGNIFICANT CHANGE UP /HPF (ref 0–4)
SCHISTOCYTES BLD QL AUTO: SLIGHT — SIGNIFICANT CHANGE UP
SODIUM SERPL-SCNC: 142 MMOL/L — SIGNIFICANT CHANGE UP (ref 135–145)
SP GR SPEC: 1.01 — SIGNIFICANT CHANGE UP (ref 1.01–1.02)
TYPE + AB SCN PNL BLD: SIGNIFICANT CHANGE UP
UROBILINOGEN FLD QL: NEGATIVE MG/DL — SIGNIFICANT CHANGE UP
WBC # BLD: 19.4 K/UL — HIGH (ref 4.8–10.8)
WBC # FLD AUTO: 19.4 K/UL — HIGH (ref 4.8–10.8)
WBC UR QL: SIGNIFICANT CHANGE UP

## 2018-04-09 PROCEDURE — 93010 ELECTROCARDIOGRAM REPORT: CPT

## 2018-04-09 PROCEDURE — 99285 EMERGENCY DEPT VISIT HI MDM: CPT

## 2018-04-09 PROCEDURE — 74177 CT ABD & PELVIS W/CONTRAST: CPT | Mod: 26

## 2018-04-09 PROCEDURE — 71045 X-RAY EXAM CHEST 1 VIEW: CPT | Mod: 26

## 2018-04-09 RX ORDER — CARVEDILOL PHOSPHATE 80 MG/1
12.5 CAPSULE, EXTENDED RELEASE ORAL EVERY 12 HOURS
Qty: 0 | Refills: 0 | Status: DISCONTINUED | OUTPATIENT
Start: 2018-04-09 | End: 2018-04-12

## 2018-04-09 RX ORDER — ENOXAPARIN SODIUM 100 MG/ML
40 INJECTION SUBCUTANEOUS DAILY
Qty: 0 | Refills: 0 | Status: DISCONTINUED | OUTPATIENT
Start: 2018-04-09 | End: 2018-04-16

## 2018-04-09 RX ORDER — AZITHROMYCIN 500 MG/1
500 TABLET, FILM COATED ORAL ONCE
Qty: 0 | Refills: 0 | Status: COMPLETED | OUTPATIENT
Start: 2018-04-09 | End: 2018-04-09

## 2018-04-09 RX ORDER — LACTOBACILLUS ACIDOPHILUS 100MM CELL
1 CAPSULE ORAL
Qty: 0 | Refills: 0 | Status: DISCONTINUED | OUTPATIENT
Start: 2018-04-09 | End: 2018-04-11

## 2018-04-09 RX ORDER — MORPHINE SULFATE 50 MG/1
4 CAPSULE, EXTENDED RELEASE ORAL ONCE
Qty: 0 | Refills: 0 | Status: DISCONTINUED | OUTPATIENT
Start: 2018-04-09 | End: 2018-04-09

## 2018-04-09 RX ORDER — AZITHROMYCIN 500 MG/1
500 TABLET, FILM COATED ORAL EVERY 24 HOURS
Qty: 0 | Refills: 0 | Status: DISCONTINUED | OUTPATIENT
Start: 2018-04-09 | End: 2018-04-11

## 2018-04-09 RX ORDER — CEFTRIAXONE 500 MG/1
1 INJECTION, POWDER, FOR SOLUTION INTRAMUSCULAR; INTRAVENOUS ONCE
Qty: 0 | Refills: 0 | Status: DISCONTINUED | OUTPATIENT
Start: 2018-04-09 | End: 2018-04-09

## 2018-04-09 RX ORDER — CEFTRIAXONE 500 MG/1
1000 INJECTION, POWDER, FOR SOLUTION INTRAMUSCULAR; INTRAVENOUS EVERY 24 HOURS
Qty: 0 | Refills: 0 | Status: COMPLETED | OUTPATIENT
Start: 2018-04-09 | End: 2018-04-09

## 2018-04-09 RX ORDER — MORPHINE SULFATE 50 MG/1
6 CAPSULE, EXTENDED RELEASE ORAL ONCE
Qty: 0 | Refills: 0 | Status: DISCONTINUED | OUTPATIENT
Start: 2018-04-09 | End: 2018-04-09

## 2018-04-09 RX ORDER — CEFTRIAXONE 500 MG/1
1000 INJECTION, POWDER, FOR SOLUTION INTRAMUSCULAR; INTRAVENOUS EVERY 24 HOURS
Qty: 0 | Refills: 0 | Status: DISCONTINUED | OUTPATIENT
Start: 2018-04-09 | End: 2018-04-11

## 2018-04-09 RX ORDER — SODIUM CHLORIDE 9 MG/ML
1000 INJECTION INTRAMUSCULAR; INTRAVENOUS; SUBCUTANEOUS ONCE
Qty: 0 | Refills: 0 | Status: COMPLETED | OUTPATIENT
Start: 2018-04-09 | End: 2018-04-09

## 2018-04-09 RX ORDER — CEFTRIAXONE 500 MG/1
1 INJECTION, POWDER, FOR SOLUTION INTRAMUSCULAR; INTRAVENOUS EVERY 24 HOURS
Qty: 0 | Refills: 0 | Status: DISCONTINUED | OUTPATIENT
Start: 2018-04-09 | End: 2018-04-09

## 2018-04-09 RX ORDER — SODIUM CHLORIDE 9 MG/ML
3 INJECTION INTRAMUSCULAR; INTRAVENOUS; SUBCUTANEOUS ONCE
Qty: 0 | Refills: 0 | Status: COMPLETED | OUTPATIENT
Start: 2018-04-09 | End: 2018-04-09

## 2018-04-09 RX ORDER — ONDANSETRON 8 MG/1
4 TABLET, FILM COATED ORAL ONCE
Qty: 0 | Refills: 0 | Status: COMPLETED | OUTPATIENT
Start: 2018-04-09 | End: 2018-04-09

## 2018-04-09 RX ORDER — ONDANSETRON 8 MG/1
4 TABLET, FILM COATED ORAL EVERY 6 HOURS
Qty: 0 | Refills: 0 | Status: DISCONTINUED | OUTPATIENT
Start: 2018-04-09 | End: 2018-04-16

## 2018-04-09 RX ORDER — MORPHINE SULFATE 50 MG/1
2 CAPSULE, EXTENDED RELEASE ORAL EVERY 6 HOURS
Qty: 0 | Refills: 0 | Status: DISCONTINUED | OUTPATIENT
Start: 2018-04-09 | End: 2018-04-16

## 2018-04-09 RX ORDER — DOCUSATE SODIUM 100 MG
100 CAPSULE ORAL
Qty: 0 | Refills: 0 | Status: DISCONTINUED | OUTPATIENT
Start: 2018-04-09 | End: 2018-04-16

## 2018-04-09 RX ORDER — ACETAMINOPHEN 500 MG
650 TABLET ORAL EVERY 6 HOURS
Qty: 0 | Refills: 0 | Status: DISCONTINUED | OUTPATIENT
Start: 2018-04-09 | End: 2018-04-16

## 2018-04-09 RX ORDER — PANTOPRAZOLE SODIUM 20 MG/1
40 TABLET, DELAYED RELEASE ORAL
Qty: 0 | Refills: 0 | Status: DISCONTINUED | OUTPATIENT
Start: 2018-04-09 | End: 2018-04-16

## 2018-04-09 RX ADMIN — MORPHINE SULFATE 6 MILLIGRAM(S): 50 CAPSULE, EXTENDED RELEASE ORAL at 21:02

## 2018-04-09 RX ADMIN — SODIUM CHLORIDE 3 MILLILITER(S): 9 INJECTION INTRAMUSCULAR; INTRAVENOUS; SUBCUTANEOUS at 17:08

## 2018-04-09 RX ADMIN — CEFTRIAXONE 1000 MILLIGRAM(S): 500 INJECTION, POWDER, FOR SOLUTION INTRAMUSCULAR; INTRAVENOUS at 21:03

## 2018-04-09 RX ADMIN — MORPHINE SULFATE 4 MILLIGRAM(S): 50 CAPSULE, EXTENDED RELEASE ORAL at 17:05

## 2018-04-09 RX ADMIN — SODIUM CHLORIDE 1000 MILLILITER(S): 9 INJECTION INTRAMUSCULAR; INTRAVENOUS; SUBCUTANEOUS at 17:06

## 2018-04-09 RX ADMIN — ONDANSETRON 4 MILLIGRAM(S): 8 TABLET, FILM COATED ORAL at 17:08

## 2018-04-09 RX ADMIN — AZITHROMYCIN 255 MILLIGRAM(S): 500 TABLET, FILM COATED ORAL at 21:06

## 2018-04-09 NOTE — H&P ADULT - PROBLEM SELECTOR PLAN 2
will keep on Rocephin and zithromax, follow cultures. pt. non toxic looking. pneumonia is picked up on ct abdomen, will get ct chest in am for better evaluation of b/L lung fields and infiltrate.

## 2018-04-09 NOTE — ED PROVIDER NOTE - OBJECTIVE STATEMENT
64 y/o F pt hx of SBO, HTN, CAD  presents to ED c/o severe right sharp cramping right abdominal pain since last night. She notes having nausea, vomiting, diarrhea and urinary frequency. Similar symptoms with prior SBO. See by her surgeon today referred to ED for evaluation. Denies fevers, chills, hematuria. No further complaints at this time.

## 2018-04-09 NOTE — ED PROVIDER NOTE - PROGRESS NOTE DETAILS
d/w dr reyes, no bowel obstruction, +RLL infiltrate; patient endorses fever last night, +right upper abd pain, elevated WBC, could all be explained by RLL infiltrate as atypical presentation, especially since bowel otherwise looks good; will admit to hospitalist for antibx, surgery will follow

## 2018-04-09 NOTE — H&P ADULT - PROBLEM SELECTOR PLAN 3
likely related to pneumonia. follow repeat cbc. continue iv antibiotics. mild anemia likely chronic , h/h stable compared to old lbas. follow repeat labs. likely related to pneumonia. follow repeat cbc. continue iv antibiotics. mild anemia likely chronic , h/h stable compared to old lbas. follow repeat labs. mild elevation of ast/ alt, follow am labs.

## 2018-04-09 NOTE — H&P ADULT - FAMILY HISTORY
Family history of diabetes mellitus (DM)     Family history of hypertension     Family history of cerebrovascular accident (CVA)     Sibling  Still living? Unknown  Family history of uterine cancer, Age at diagnosis: Age Unknown

## 2018-04-09 NOTE — CONSULT NOTE ADULT - ATTENDING COMMENTS
As the attending I have independently seen, evaluated the patient, her films, and written the consult note.

## 2018-04-09 NOTE — H&P ADULT - PSH
Artificial cardiac pacemaker  Reports "shut off."  H/O abdominal hysterectomy    H/O laminectomy    History of appendectomy    S/P cholecystectomy    S/P spinal surgery  S/P spinal cord stimulator placed.

## 2018-04-09 NOTE — CONSULT NOTE ADULT - ASSESSMENT
Mrs. Lawler has a RLL infiltrate c/ L shift, no evidence of SBO.  While this is not the typical presentation for a pneumonia, there is no evidence of bowel being the source of her elevated WBC and sx. I would recommend hydration, abx, and f/u labs/films (CXR) by the medical/hospitalist team.  Currently she has no evidence of SBO and no acute surgical issues.  Will follow while she is in hospital and as oupt on PRN basis.

## 2018-04-09 NOTE — H&P ADULT - PROBLEM SELECTOR PLAN 1
pt's abd. pain might be related to lower lobe pneumonia. pt. tolerated po diet in ER, no RT, no guarding on abd. exam. ct abd. did not show bowl obstruction. surgical consult appreciated, team will follow. prn morphine.

## 2018-04-09 NOTE — ED ADULT TRIAGE NOTE - CHIEF COMPLAINT QUOTE
pt alert and awake x3, c/o abd pain assoc with n/v/d, has hx of bowel resection, sent by PMD for r/o bowel obstruction

## 2018-04-09 NOTE — H&P ADULT - NSHPPHYSICALEXAM_GEN_ALL_CORE
General: Well developed   female not in distress.   HEENT: AT, NC. PERRL. intact EOM. no throat erythema or exudate.   Neck: supple. no JVD.   Chest: CTA bilaterally. no wheeze or rales.   Heart: normal S1,S2. RRR. no heart murmur.  Abdomen: soft. mild pain to palpation in rt. upper to rt. mid abd. area  non-distended. no guarding. no RT. + BS.   Genitalia / rectal : deferred by pt.   Ext: no C/C/E. chronically limited ROM of left lower ext. due to pain.   Neuro: AAO x3. CN II to XII intact. non focal.   Skin: no rash noted. no warmth. no diaphoresis.   Psychiatric : normal. General: Well developed   female not in distress.   HEENT: AT, NC. PERRL. intact EOM. no throat erythema or exudate.   Neck: supple. no JVD.   Chest: CTA bilaterally. no wheeze or rales.   Heart: normal S1,S2. RRR. no heart murmur.  Abdomen: soft. mild pain to palpation in rt. upper to rt. mid abd. area. no pain in other abd. quadrants appreciated.   non-distended. no guarding. no RT. + BS.   Genitalia / rectal : deferred by pt.   Ext: no C/C/E. chronically limited ROM of left lower ext. due to pain.   Neuro: AAO x3. CN II to XII intact. non focal.   Skin: no rash noted. no warmth. no diaphoresis.   Psychiatric : normal.

## 2018-04-09 NOTE — ED ADULT NURSE NOTE - NS ED NOTE ABUSE RESPONSE YN
H/P w cardiology done 11/8/18.  Patient informed that anesthesia will not accept a preop older then 30days.  She will need to see, preferably,  her PCP or cardiology or postpone surgery until the preop is completed.    Patient verbalized understanding.   Yes

## 2018-04-09 NOTE — H&P ADULT - HISTORY OF PRESENT ILLNESS
62 y/o female came to ER, sent in by her surgeon as she started  abdominal pain which was more in the rt. upper to lower quadrant, 8 out of 10 and constant, associated with n/v. pt. reports fever of 102 last night . no diarrhea. had a loose BM this am. pt. has noticed lack of flatus. As per pt. her sympt. were similar to her  prior bowl obstruction for which she got surgery about 1 year ago. pt. reports no cough. no phlegm no sick contact. no swallowing problem. no flu like sympt. Pt. has chronic low back pain and is not very mobile person. In the ER pt's ct abdomen did not show bowl obstruction but has shown rt. middle and lower lobe infiltrate. no cp. no sob. 62 y/o female came to ER, sent in by her surgeon as she started  abdominal pain which was more in the rt. upper to lower quadrant, 8 out of 10 and constant, associated with n/v. pt. reports fever of 102 last night . no diarrhea. had a loose BM this am. pt. has noticed lack of flatus. As per pt. her sympt. were similar to her  prior bowl obstruction for which she got surgery about 1 year ago. pt. reports no cough. no phlegm no sick contact. no swallowing problem. no flu like sympt. Pt. has chronic low back pain and is not very mobile person. In the ER pt's ct abdomen did not show bowl obstruction but has shown rt. middle and lower lobe infiltrate. no cp. no sob. pt. was given food in the ER which she tolerated and reports no n/v. 64 y/o female came to ER, sent in by her surgeon as she started  abdominal pain which was more in the rt. upper to lower quadrant, 8 out of 10 and constant, associated with n/v. pt. reports fever of 102 last night . no diarrhea. had a loose BM this am. pt. has noticed lack of flatus. As per pt. her sympt. were similar to her  prior bowl obstruction for which she got surgery about 1 year ago. pt. reports no cough. no phlegm no sick contact. no swallowing problem. no flu like sympt. Pt. has chronic low back pain and is not very mobile person. In the ER pt's ct abdomen did not show bowl obstruction but has shown rt. middle and lower lobe infiltrate. no cp. no sob. pt. was given food in the ER which she tolerated and reports no n/v. pt. reports that she does not take escitalopram any more.

## 2018-04-09 NOTE — CONSULT NOTE ADULT - SUBJECTIVE AND OBJECTIVE BOX
Mrs. Lawler is here for evaluation of R sided abd pain, nausea, vomiting, and lack of flatus.  She noticed the pain worsening last night.  She had a BM this morning, tried to eat some soup, but the pain went to an 8/10.  She did endorse some fevers/chills overnight.  Denies any other recent changes to her current state of health.        PHYSICAL EXAM:  ICU Vital Signs Last 24 Hrs  T(C): 37.4 (09 Apr 2018 19:26), Max: 37.4 (09 Apr 2018 19:26)  T(F): 99.3 (09 Apr 2018 19:26), Max: 99.3 (09 Apr 2018 19:26)  HR: 80 (09 Apr 2018 19:26) (80 - 82)  BP: 183/91 (09 Apr 2018 19:26) (170/100 - 183/91)  BP(mean): --  ABP: --  ABP(mean): --  RR: 17 (09 Apr 2018 19:26) (17 - 18)  SpO2: 98% (09 Apr 2018 19:26) (98% - 98%)    Constitutional: well nourished, well groomed, in NAD     HEENT: NCAT, PERRL     Gastrointestinal: soft, obese, non distended.  Tender to deep palpation at midline scar, mostly on the R side.  Laxity/hernia of abd wall noted, soft, no skin changes.  No rebound, no guarding.      Neurological:  appropriate and conversational                          10.3   19.4  )-----------( 233      ( 09 Apr 2018 17:33 )             33.7     *WBC c/ 85% neutrophils    04-09    142  |  103  |  21.0<H>  ----------------------------<  95  3.8   |  26.0  |  0.75    Ca    8.7      09 Apr 2018 17:33    TPro  6.6  /  Alb  3.8  /  TBili  1.0  /  DBili  x   /  AST  55<H>  /  ALT  42<H>  /  AlkPhos  112  04-09    CT Scan reviewed personally: RLL infiltrate noted, contrast goes into colon s/ difficulty.  Hernia/diastasis noted c/ non obs colon in place.  No evidence of SBO.  No free fluid.

## 2018-04-09 NOTE — ED ADULT NURSE REASSESSMENT NOTE - NS ED NURSE REASSESS COMMENT FT1
Patient resting in bed, awake, alert and oriented X3, resp even/unlabored, lungs CTAB, patient reports pain on right abdomen, awaiting for radiology, will monitor patient and await for dispo.

## 2018-04-10 DIAGNOSIS — Z29.9 ENCOUNTER FOR PROPHYLACTIC MEASURES, UNSPECIFIED: ICD-10-CM

## 2018-04-10 DIAGNOSIS — M54.9 DORSALGIA, UNSPECIFIED: ICD-10-CM

## 2018-04-10 DIAGNOSIS — M54.42 LUMBAGO WITH SCIATICA, LEFT SIDE: ICD-10-CM

## 2018-04-10 DIAGNOSIS — R19.7 DIARRHEA, UNSPECIFIED: ICD-10-CM

## 2018-04-10 DIAGNOSIS — D72.829 ELEVATED WHITE BLOOD CELL COUNT, UNSPECIFIED: ICD-10-CM

## 2018-04-10 DIAGNOSIS — I25.10 ATHEROSCLEROTIC HEART DISEASE OF NATIVE CORONARY ARTERY WITHOUT ANGINA PECTORIS: ICD-10-CM

## 2018-04-10 DIAGNOSIS — R78.81 BACTEREMIA: ICD-10-CM

## 2018-04-10 LAB
-  COAGULASE NEGATIVE STAPHYLOCOCCUS: SIGNIFICANT CHANGE UP
C DIFF BY PCR RESULT: SIGNIFICANT CHANGE UP
C DIFF TOX GENS STL QL NAA+PROBE: SIGNIFICANT CHANGE UP
METHOD TYPE: SIGNIFICANT CHANGE UP

## 2018-04-10 PROCEDURE — 99233 SBSQ HOSP IP/OBS HIGH 50: CPT

## 2018-04-10 PROCEDURE — 71250 CT THORAX DX C-: CPT | Mod: 26

## 2018-04-10 RX ORDER — HYDRALAZINE HCL 50 MG
10 TABLET ORAL ONCE
Qty: 0 | Refills: 0 | Status: COMPLETED | OUTPATIENT
Start: 2018-04-10 | End: 2018-04-10

## 2018-04-10 RX ORDER — MORPHINE SULFATE 50 MG/1
2 CAPSULE, EXTENDED RELEASE ORAL ONCE
Qty: 0 | Refills: 0 | Status: DISCONTINUED | OUTPATIENT
Start: 2018-04-10 | End: 2018-04-10

## 2018-04-10 RX ORDER — ALBUTEROL 90 UG/1
1 AEROSOL, METERED ORAL EVERY 4 HOURS
Qty: 0 | Refills: 0 | Status: DISCONTINUED | OUTPATIENT
Start: 2018-04-10 | End: 2018-04-16

## 2018-04-10 RX ORDER — VANCOMYCIN HCL 1 G
1000 VIAL (EA) INTRAVENOUS EVERY 12 HOURS
Qty: 0 | Refills: 0 | Status: DISCONTINUED | OUTPATIENT
Start: 2018-04-10 | End: 2018-04-11

## 2018-04-10 RX ORDER — OXYCODONE HYDROCHLORIDE 5 MG/1
10 TABLET ORAL ONCE
Qty: 0 | Refills: 0 | Status: DISCONTINUED | OUTPATIENT
Start: 2018-04-10 | End: 2018-04-10

## 2018-04-10 RX ORDER — SODIUM CHLORIDE 9 MG/ML
1000 INJECTION INTRAMUSCULAR; INTRAVENOUS; SUBCUTANEOUS
Qty: 0 | Refills: 0 | Status: COMPLETED | OUTPATIENT
Start: 2018-04-10 | End: 2018-04-10

## 2018-04-10 RX ORDER — IPRATROPIUM/ALBUTEROL SULFATE 18-103MCG
3 AEROSOL WITH ADAPTER (GRAM) INHALATION EVERY 6 HOURS
Qty: 0 | Refills: 0 | Status: DISCONTINUED | OUTPATIENT
Start: 2018-04-10 | End: 2018-04-16

## 2018-04-10 RX ORDER — TIOTROPIUM BROMIDE 18 UG/1
1 CAPSULE ORAL; RESPIRATORY (INHALATION) DAILY
Qty: 0 | Refills: 0 | Status: DISCONTINUED | OUTPATIENT
Start: 2018-04-10 | End: 2018-04-16

## 2018-04-10 RX ORDER — OXYCODONE HYDROCHLORIDE 5 MG/1
10 TABLET ORAL
Qty: 0 | Refills: 0 | Status: DISCONTINUED | OUTPATIENT
Start: 2018-04-10 | End: 2018-04-12

## 2018-04-10 RX ORDER — GABAPENTIN 400 MG/1
300 CAPSULE ORAL DAILY
Qty: 0 | Refills: 0 | Status: DISCONTINUED | OUTPATIENT
Start: 2018-04-10 | End: 2018-04-12

## 2018-04-10 RX ADMIN — MORPHINE SULFATE 2 MILLIGRAM(S): 50 CAPSULE, EXTENDED RELEASE ORAL at 08:00

## 2018-04-10 RX ADMIN — MORPHINE SULFATE 2 MILLIGRAM(S): 50 CAPSULE, EXTENDED RELEASE ORAL at 22:38

## 2018-04-10 RX ADMIN — OXYCODONE HYDROCHLORIDE 10 MILLIGRAM(S): 5 TABLET ORAL at 10:15

## 2018-04-10 RX ADMIN — MORPHINE SULFATE 2 MILLIGRAM(S): 50 CAPSULE, EXTENDED RELEASE ORAL at 15:58

## 2018-04-10 RX ADMIN — MORPHINE SULFATE 2 MILLIGRAM(S): 50 CAPSULE, EXTENDED RELEASE ORAL at 02:50

## 2018-04-10 RX ADMIN — ENOXAPARIN SODIUM 40 MILLIGRAM(S): 100 INJECTION SUBCUTANEOUS at 13:20

## 2018-04-10 RX ADMIN — ONDANSETRON 4 MILLIGRAM(S): 8 TABLET, FILM COATED ORAL at 13:20

## 2018-04-10 RX ADMIN — OXYCODONE HYDROCHLORIDE 10 MILLIGRAM(S): 5 TABLET ORAL at 16:06

## 2018-04-10 RX ADMIN — MORPHINE SULFATE 2 MILLIGRAM(S): 50 CAPSULE, EXTENDED RELEASE ORAL at 22:23

## 2018-04-10 RX ADMIN — MORPHINE SULFATE 2 MILLIGRAM(S): 50 CAPSULE, EXTENDED RELEASE ORAL at 03:05

## 2018-04-10 RX ADMIN — MORPHINE SULFATE 2 MILLIGRAM(S): 50 CAPSULE, EXTENDED RELEASE ORAL at 14:00

## 2018-04-10 RX ADMIN — OXYCODONE HYDROCHLORIDE 10 MILLIGRAM(S): 5 TABLET ORAL at 11:45

## 2018-04-10 RX ADMIN — AZITHROMYCIN 255 MILLIGRAM(S): 500 TABLET, FILM COATED ORAL at 22:22

## 2018-04-10 RX ADMIN — CEFTRIAXONE 1000 MILLIGRAM(S): 500 INJECTION, POWDER, FOR SOLUTION INTRAMUSCULAR; INTRAVENOUS at 17:36

## 2018-04-10 RX ADMIN — SODIUM CHLORIDE 75 MILLILITER(S): 9 INJECTION INTRAMUSCULAR; INTRAVENOUS; SUBCUTANEOUS at 13:14

## 2018-04-10 RX ADMIN — MORPHINE SULFATE 2 MILLIGRAM(S): 50 CAPSULE, EXTENDED RELEASE ORAL at 07:18

## 2018-04-10 RX ADMIN — OXYCODONE HYDROCHLORIDE 10 MILLIGRAM(S): 5 TABLET ORAL at 17:36

## 2018-04-10 RX ADMIN — CARVEDILOL PHOSPHATE 12.5 MILLIGRAM(S): 80 CAPSULE, EXTENDED RELEASE ORAL at 16:06

## 2018-04-10 RX ADMIN — CARVEDILOL PHOSPHATE 12.5 MILLIGRAM(S): 80 CAPSULE, EXTENDED RELEASE ORAL at 05:25

## 2018-04-10 RX ADMIN — Medication 1 TABLET(S): at 10:02

## 2018-04-10 RX ADMIN — MORPHINE SULFATE 2 MILLIGRAM(S): 50 CAPSULE, EXTENDED RELEASE ORAL at 01:07

## 2018-04-10 RX ADMIN — MORPHINE SULFATE 2 MILLIGRAM(S): 50 CAPSULE, EXTENDED RELEASE ORAL at 01:22

## 2018-04-10 RX ADMIN — ONDANSETRON 4 MILLIGRAM(S): 8 TABLET, FILM COATED ORAL at 02:09

## 2018-04-10 RX ADMIN — Medication 10 MILLIGRAM(S): at 16:53

## 2018-04-10 NOTE — PROGRESS NOTE ADULT - PROBLEM SELECTOR PLAN 5
Will continue with coreg  Monitor Will continue with coreg 12.5 BID  Verified her medications with pharmacy, pt has not filled her coreg prescription with them since september 2017. Pharmacy reports she last filled coreg 25mg BID september 2017..  Will continue with coreg 12.5mg BID for now, monitor BP. Currently normotensive but if elevated will start coreg 25mg BID. Currently resolved  Tolerating PO diet  Will give 1L IV fluids to rehydrate due to multiple reported episodes of vomit at home  Zofran PRN

## 2018-04-10 NOTE — PROGRESS NOTE ADULT - PROBLEM SELECTOR PROBLEM 7
CAD (coronary artery disease) Low back pain with left-sided sciatica, unspecified back pain laterality, unspecified chronicity

## 2018-04-10 NOTE — PROGRESS NOTE ADULT - PROBLEM SELECTOR PLAN 3
3 episodes of loose stools   Follow up c.diff  Will give 1L IV fluids to rehydrate due to pna and bacteremia  - repeat labs in am  - plan as above

## 2018-04-10 NOTE — PROGRESS NOTE ADULT - SUBJECTIVE AND OBJECTIVE BOX
HPI/hospital course: 64 y/o female with PMH CAD, pacemaker HTN, HLD, chronic low back pain, hx of ovarian cancer, hx of bowel obstruction requiring SBR 1 yr ago who came to ER for worsening RUQ abdominal pain, 8/10, constant, assocated with n/v and temp of 102 orally last night.  In the ER, leukocytosis on labs, ct abdomen did not show bowl obstruction but has shown rt. middle and lower lobe infiltrate. CT chest: multifocal low right upper lobe and right lower lobe airspace consolidations concerning for infectious pneumonia. Pt started on IV abx. Consulted by surgery, no SBO indications/surgical indications. Side note: **was seen by behavioral health on last admission for psychosocial stressors, started on lexapro, as per pt does not take anymore.     Pt seen and examined at bedside. Pt ano x 3, laying in bed. Pt complaining of back pain primarily. Abdominal pain still present as per pt, RLQ. Pt admits to 3 loose bowel movements this morning after starting abx yesterday. Pt denies recent vomiting, fever, chills, headache, chest pain, SOB, cough, constipation.     MEDICATIONS  (STANDING):  ALBUTerol    90 MICROgram(s) HFA Inhaler 1 Puff(s) Inhalation every 4 hours  azithromycin  IVPB 500 milliGRAM(s) IV Intermittent every 24 hours  carvedilol 12.5 milliGRAM(s) Oral every 12 hours  cefTRIAXone Injectable. 1000 milliGRAM(s) IV Push every 24 hours  docusate sodium 100 milliGRAM(s) Oral two times a day  enoxaparin Injectable 40 milliGRAM(s) SubCutaneous daily  lactobacillus acidophilus 1 Tablet(s) Oral two times a day with meals  oxyCODONE    IR 10 milliGRAM(s) Oral four times a day  pantoprazole    Tablet 40 milliGRAM(s) Oral before breakfast  tiotropium 18 MICROgram(s) Capsule 1 Capsule(s) Inhalation daily    MEDICATIONS  (PRN):  acetaminophen   Tablet 650 milliGRAM(s) Oral every 6 hours PRN For Temp greater than or equal to 38 C (100.4 F) / mild to moderate pain.  ALBUTerol/ipratropium for Nebulization 3 milliLiter(s) Nebulizer every 6 hours PRN Shortness of Breath and/or Wheezing  morphine  - Injectable 2 milliGRAM(s) IV Push every 6 hours PRN Severe Pain (7 - 10)  ondansetron Injectable 4 milliGRAM(s) IV Push every 6 hours PRN Nausea and/or Vomiting    Allergies  Dilaudid (Nausea)  peanuts (Hives)    PMH:   CAD (coronary artery disease)    CVA (cerebral vascular accident)    HTN (hypertension)    Low back pain with left-sided sciatica, unspecified back pain laterality, unspecified chronicity    Malignant neoplasm of ovary, unspecified laterality    Pacemaker  Stented coronary artery    Thalassemia, unspecified type    Urinary incontinence, unspecified type    Varicose veins  left lower extremity.    REVIEW OF SYSTEMS:  CONSTITUTIONAL: No fever, weight loss, or fatigue  EYES: No eye pain, visual disturbances, or discharge  ENMT:  No difficulty hearing, tinnitus, vertigo; No sinus or throat pain  RESPIRATORY: No cough, wheezing, chills or hemoptysis; No shortness of breath  CARDIOVASCULAR: No chest pain, palpitations, dizziness, or leg swelling  GASTROINTESTINAL: see HPI  GENITOURINARY: No dysuria, frequency, hematuria, or incontinence  NEUROLOGICAL: No headaches, memory loss, loss of strength, numbness, or tremors  MUSCULOSKELETAL: No joint pain or swelling; No muscle, back, or extremity pain  PSYCHIATRIC: No depression, anxiety, mood swings, or difficulty sleeping.      Vital Signs Last 24 Hrs  T(C): 37.1 (10 Apr 2018 07:57), Max: 37.4 (2018 19:26)  T(F): 98.7 (10 Apr 2018 07:57), Max: 99.3 (2018 19:26)  HR: 65 (10 Apr 2018 07:57) (65 - 82)  BP: 141/67 (10 Apr 2018 07:57) (141/67 - 195/86)  BP(mean): --  RR: 19 (10 Apr 2018 07:57) (17 - 19)  SpO2: 98% (10 Apr 2018 01:26) (98% - 98%)    PHYSICAL EXAM:  GENERAL: NAD, well-groomed, well-developed  HEAD:  Atraumatic, Normocephalic  EYES: EOMI, PERRLA, conjunctiva and sclera clear  NERVOUS SYSTEM:  Alert & Oriented X3, Good concentration  CHEST/LUNG: Clear to auscultation bilaterally; No rales, rhonchi, wheezing, or rubs  HEART: Regular rate and rhythm; No murmurs, rubs, or gallops  ABDOMEN: Soft, Nondistended, tender to palpation in RLQ, bowel sounds present  EXTREMITIES:  2+ Peripheral Pulses, No clubbing, cyanosis, or edema      LABS:                        10.3   19.4  )-----------( 233      ( 2018 17:33 )             33.7     2018 17:33    142    |  103    |  21.0   ----------------------------<  95     3.8     |  26.0   |  0.75     Ca    8.7        2018 17:33    TPro  6.6    /  Alb  3.8    /  TBili  1.0    /  DBili  x      /  AST  55     /  ALT  42     /  AlkPhos  112    2018 17:33    PT/INR - ( 2018 17:33 )   PT: 12.6 sec;   INR: 1.14 ratio         Urinalysis Basic - ( 2018 19:46 )  Color: Yellow / Appearance: Clear / S.015 / pH: x  Gluc: x / Ketone: Negative  / Bili: Negative / Urobili: Negative mg/dL   Blood: x / Protein: 30 mg/dL / Nitrite: Negative   Leuk Esterase: Trace / RBC: 0-2 /HPF / WBC 0-2   Sq Epi: x / Non Sq Epi: Few / Bacteria: Few      RADIOLOGY & ADDITIONAL TESTS:   EXAM:  CT CHEST                        PROCEDURE DATE:  04/10/2018    IMPRESSION:   multifocal low right upper lobe and right lower lobe airspace   consolidations concerning for infectious pneumonia.    reactive right hilar and mediastinal adenopathy..     EXAM:  CT ABDOMEN AND PELVIS OC IC                        PROCEDURE DATE:  2018    IMPRESSION:  Extensive infiltrate at the right lung basein the right middle lobe and   right lower lobe.  No evidence of intestinal obstruction.  Diverticulosis.. HPI/hospital course: 64 y/o female with PMH CAD, pacemaker HTN, HLD, chronic low back pain, hx of ovarian cancer s/o complete hysterectomy, appendectomy, cholecystectomy, hx of bowel obstruction requiring SBR 1 yr ago who came to ER for worsening RUQ abdominal pain, 8/10, constant, assocated with n/v and temp of 102 orally last night.  In the ER, leukocytosis on labs, ct abdomen did not show bowl obstruction but has shown rt. middle and lower lobe infiltrate. CT chest: multifocal low right upper lobe and right lower lobe airspace consolidations concerning for infectious pneumonia. Pt started on IV abx. Consulted by surgery, no SBO indications/surgical indications. Side note: **was seen by behavioral health on last admission for psychosocial stressors, started on lexapro, as per pt does not take anymore.     Pt seen and examined at bedside. Pt ano x 3, laying in bed. Pt complaining of back pain primarily. Abdominal pain still present as per pt, RLQ. Pt admits to 3 loose bowel movements this morning after starting abx yesterday. Pt denies recent vomiting, fever, chills, headache, chest pain, SOB, cough, constipation.     MEDICATIONS  (STANDING):  ALBUTerol    90 MICROgram(s) HFA Inhaler 1 Puff(s) Inhalation every 4 hours  azithromycin  IVPB 500 milliGRAM(s) IV Intermittent every 24 hours  carvedilol 12.5 milliGRAM(s) Oral every 12 hours  cefTRIAXone Injectable. 1000 milliGRAM(s) IV Push every 24 hours  docusate sodium 100 milliGRAM(s) Oral two times a day  enoxaparin Injectable 40 milliGRAM(s) SubCutaneous daily  lactobacillus acidophilus 1 Tablet(s) Oral two times a day with meals  oxyCODONE    IR 10 milliGRAM(s) Oral four times a day  pantoprazole    Tablet 40 milliGRAM(s) Oral before breakfast  tiotropium 18 MICROgram(s) Capsule 1 Capsule(s) Inhalation daily    MEDICATIONS  (PRN):  acetaminophen   Tablet 650 milliGRAM(s) Oral every 6 hours PRN For Temp greater than or equal to 38 C (100.4 F) / mild to moderate pain.  ALBUTerol/ipratropium for Nebulization 3 milliLiter(s) Nebulizer every 6 hours PRN Shortness of Breath and/or Wheezing  morphine  - Injectable 2 milliGRAM(s) IV Push every 6 hours PRN Severe Pain (7 - 10)  ondansetron Injectable 4 milliGRAM(s) IV Push every 6 hours PRN Nausea and/or Vomiting    Allergies  Dilaudid (Nausea)  peanuts (Hives)    PMH:   CAD (coronary artery disease)    CVA (cerebral vascular accident)    HTN (hypertension)    Low back pain with left-sided sciatica, unspecified back pain laterality, unspecified chronicity    Malignant neoplasm of ovary, unspecified laterality    Pacemaker  Stented coronary artery    Thalassemia, unspecified type    Urinary incontinence, unspecified type    Varicose veins  left lower extremity.    REVIEW OF SYSTEMS:  CONSTITUTIONAL: No fever, weight loss, or fatigue  EYES: No eye pain, visual disturbances, or discharge  ENMT:  No difficulty hearing, tinnitus, vertigo; No sinus or throat pain  RESPIRATORY: No cough, wheezing, chills or hemoptysis; No shortness of breath  CARDIOVASCULAR: No chest pain, palpitations, dizziness, or leg swelling  GASTROINTESTINAL: see HPI  GENITOURINARY: No dysuria, frequency, hematuria, or incontinence  NEUROLOGICAL: No headaches, memory loss, loss of strength, numbness, or tremors  MUSCULOSKELETAL: No joint pain or swelling; No muscle, back, or extremity pain  PSYCHIATRIC: No depression, anxiety, mood swings, or difficulty sleeping.      Vital Signs Last 24 Hrs  T(C): 37.1 (10 Apr 2018 07:57), Max: 37.4 (2018 19:26)  T(F): 98.7 (10 Apr 2018 07:57), Max: 99.3 (2018 19:26)  HR: 65 (10 Apr 2018 07:57) (65 - 82)  BP: 141/67 (10 Apr 2018 07:57) (141/67 - 195/86)  BP(mean): --  RR: 19 (10 Apr 2018 07:57) (17 - 19)  SpO2: 98% (10 Apr 2018 01:26) (98% - 98%)    PHYSICAL EXAM:  GENERAL: NAD, well-groomed, well-developed  HEAD:  Atraumatic, Normocephalic  EYES: EOMI, PERRLA, conjunctiva and sclera clear  NERVOUS SYSTEM:  Alert & Oriented X3, Good concentration  CHEST/LUNG: Clear to auscultation bilaterally; No rales, rhonchi, wheezing, or rubs  HEART: Regular rate and rhythm; No murmurs, rubs, or gallops  ABDOMEN: Soft, Nondistended, tender to palpation in RLQ, bowel sounds present  EXTREMITIES:  2+ Peripheral Pulses, No clubbing, cyanosis, or edema      LABS:                        10.3   19.4  )-----------( 233      ( 2018 17:33 )             33.7     2018 17:33    142    |  103    |  21.0   ----------------------------<  95     3.8     |  26.0   |  0.75     Ca    8.7        2018 17:33    TPro  6.6    /  Alb  3.8    /  TBili  1.0    /  DBili  x      /  AST  55     /  ALT  42     /  AlkPhos  112    2018 17:33    PT/INR - ( 2018 17:33 )   PT: 12.6 sec;   INR: 1.14 ratio         Urinalysis Basic - ( 2018 19:46 )  Color: Yellow / Appearance: Clear / S.015 / pH: x  Gluc: x / Ketone: Negative  / Bili: Negative / Urobili: Negative mg/dL   Blood: x / Protein: 30 mg/dL / Nitrite: Negative   Leuk Esterase: Trace / RBC: 0-2 /HPF / WBC 0-2   Sq Epi: x / Non Sq Epi: Few / Bacteria: Few      RADIOLOGY & ADDITIONAL TESTS:   EXAM:  CT CHEST                        PROCEDURE DATE:  04/10/2018    IMPRESSION:   multifocal low right upper lobe and right lower lobe airspace   consolidations concerning for infectious pneumonia.    reactive right hilar and mediastinal adenopathy..     EXAM:  CT ABDOMEN AND PELVIS OC IC                        PROCEDURE DATE:  2018    IMPRESSION:  Extensive infiltrate at the right lung basein the right middle lobe and   right lower lobe.  No evidence of intestinal obstruction.  Diverticulosis.. HPI/hospital course: 64 y/o female with PMH CAD, pacemaker HTN, HLD, chronic low back pain, hx of ovarian cancer s/o complete hysterectomy, appendectomy, cholecystectomy, gastric bypass, hx of bowel obstruction requiring SBR 1 yr ago who came to ER for worsening RUQ abdominal pain, 8/10, constant, assocated with n/v and temp of 102 orally last night.  In the ER, leukocytosis on labs, ct abdomen did not show bowl obstruction but has shown rt. middle and lower lobe infiltrate. CT chest: multifocal low right upper lobe and right lower lobe airspace consolidations concerning for infectious pneumonia. Pt started on IV abx. Consulted by surgery, no SBO indications/surgical indications. Side note: **was seen by behavioral health on last admission for psychosocial stressors, started on lexapro, as per pt does not take anymore.     Pt seen and examined at bedside. Pt ano x 3, laying in bed. Pt complaining of back pain primarily. Abdominal pain still present as per pt, RLQ. Pt admits to 3 loose bowel movements this morning after starting abx yesterday. Pt denies recent vomiting, fever, chills, headache, chest pain, SOB, cough, constipation.     MEDICATIONS  (STANDING):  ALBUTerol    90 MICROgram(s) HFA Inhaler 1 Puff(s) Inhalation every 4 hours  azithromycin  IVPB 500 milliGRAM(s) IV Intermittent every 24 hours  carvedilol 12.5 milliGRAM(s) Oral every 12 hours  cefTRIAXone Injectable. 1000 milliGRAM(s) IV Push every 24 hours  docusate sodium 100 milliGRAM(s) Oral two times a day  enoxaparin Injectable 40 milliGRAM(s) SubCutaneous daily  lactobacillus acidophilus 1 Tablet(s) Oral two times a day with meals  oxyCODONE    IR 10 milliGRAM(s) Oral four times a day  pantoprazole    Tablet 40 milliGRAM(s) Oral before breakfast  tiotropium 18 MICROgram(s) Capsule 1 Capsule(s) Inhalation daily    MEDICATIONS  (PRN):  acetaminophen   Tablet 650 milliGRAM(s) Oral every 6 hours PRN For Temp greater than or equal to 38 C (100.4 F) / mild to moderate pain.  ALBUTerol/ipratropium for Nebulization 3 milliLiter(s) Nebulizer every 6 hours PRN Shortness of Breath and/or Wheezing  morphine  - Injectable 2 milliGRAM(s) IV Push every 6 hours PRN Severe Pain (7 - 10)  ondansetron Injectable 4 milliGRAM(s) IV Push every 6 hours PRN Nausea and/or Vomiting    Allergies  Dilaudid (Nausea)  peanuts (Hives)    PMH:   CAD (coronary artery disease)    CVA (cerebral vascular accident)    HTN (hypertension)    Low back pain with left-sided sciatica, unspecified back pain laterality, unspecified chronicity    Malignant neoplasm of ovary, unspecified laterality    Pacemaker  Stented coronary artery    Thalassemia, unspecified type    Urinary incontinence, unspecified type    Varicose veins  left lower extremity.    REVIEW OF SYSTEMS:  CONSTITUTIONAL: No fever, weight loss, or fatigue  EYES: No eye pain, visual disturbances, or discharge  ENMT:  No difficulty hearing, tinnitus, vertigo; No sinus or throat pain  RESPIRATORY: No cough, wheezing, chills or hemoptysis; No shortness of breath  CARDIOVASCULAR: No chest pain, palpitations, dizziness, or leg swelling  GASTROINTESTINAL: see HPI  GENITOURINARY: No dysuria, frequency, hematuria, or incontinence  NEUROLOGICAL: No headaches, memory loss, loss of strength, numbness, or tremors  MUSCULOSKELETAL: No joint pain or swelling; No muscle, back, or extremity pain  PSYCHIATRIC: No depression, anxiety, mood swings, or difficulty sleeping.      Vital Signs Last 24 Hrs  T(C): 37.1 (10 Apr 2018 07:57), Max: 37.4 (2018 19:26)  T(F): 98.7 (10 Apr 2018 07:57), Max: 99.3 (2018 19:26)  HR: 65 (10 Apr 2018 07:57) (65 - 82)  BP: 141/67 (10 Apr 2018 07:57) (141/67 - 195/86)  BP(mean): --  RR: 19 (10 Apr 2018 07:57) (17 - 19)  SpO2: 98% (10 Apr 2018 01:26) (98% - 98%)    PHYSICAL EXAM:  GENERAL: NAD, well-groomed, well-developed  HEAD:  Atraumatic, Normocephalic  EYES: EOMI, PERRLA, conjunctiva and sclera clear  NERVOUS SYSTEM:  Alert & Oriented X3, Good concentration  CHEST/LUNG: Clear to auscultation bilaterally; No rales, rhonchi, wheezing, or rubs  HEART: Regular rate and rhythm; No murmurs, rubs, or gallops  ABDOMEN: Soft, Nondistended, tender to palpation in RLQ, bowel sounds present  EXTREMITIES:  2+ Peripheral Pulses, No clubbing, cyanosis, or edema      LABS:                        10.3   19.4  )-----------( 233      ( 2018 17:33 )             33.7     2018 17:33    142    |  103    |  21.0   ----------------------------<  95     3.8     |  26.0   |  0.75     Ca    8.7        2018 17:33    TPro  6.6    /  Alb  3.8    /  TBili  1.0    /  DBili  x      /  AST  55     /  ALT  42     /  AlkPhos  112    2018 17:33    PT/INR - ( 2018 17:33 )   PT: 12.6 sec;   INR: 1.14 ratio         Urinalysis Basic - ( 2018 19:46 )  Color: Yellow / Appearance: Clear / S.015 / pH: x  Gluc: x / Ketone: Negative  / Bili: Negative / Urobili: Negative mg/dL   Blood: x / Protein: 30 mg/dL / Nitrite: Negative   Leuk Esterase: Trace / RBC: 0-2 /HPF / WBC 0-2   Sq Epi: x / Non Sq Epi: Few / Bacteria: Few      RADIOLOGY & ADDITIONAL TESTS:   EXAM:  CT CHEST                        PROCEDURE DATE:  04/10/2018    IMPRESSION:   multifocal low right upper lobe and right lower lobe airspace   consolidations concerning for infectious pneumonia.    reactive right hilar and mediastinal adenopathy..     EXAM:  CT ABDOMEN AND PELVIS OC IC                        PROCEDURE DATE:  2018    IMPRESSION:  Extensive infiltrate at the right lung basein the right middle lobe and   right lower lobe.  No evidence of intestinal obstruction.  Diverticulosis..

## 2018-04-10 NOTE — PROGRESS NOTE ADULT - PROBLEM SELECTOR PROBLEM 6
Low back pain with left-sided sciatica, unspecified back pain laterality, unspecified chronicity Essential hypertension

## 2018-04-10 NOTE — PROGRESS NOTE ADULT - PROBLEM SELECTOR PLAN 6
As per pt, follows with Dr. Xiong. Was suppose to have appointment tomorrow. Will call him to have pt be seen here  As per pt takes oxycodone 10mg PO every 4 hours   Will verify pain meds and restart As per pt, follows with Dr. Xiong. Was suppose to have appointment tomorrow. Will call him to have pt be seen here  As per pt takes oxycodone 10mg PO every 4 hours   Verified medications with pts pharmacy ([facility]), receives oxycodone 10mg PO 4x a day, in addition to gabapentin 300mg PO QD. As per pt, follows with Dr. Xiong. Was suppose to have appointment tomorrow. Will call him to have pt be seen here  As per pt takes oxycodone 10mg PO every 4 hours   Verified medications with pts pharmacy ([facility]), receives oxycodone 10mg PO 4x a day, in addition to gabapentin 300mg PO QD and lyrica 75mg PO BID. As per pt, follows with Dr. Xiong. Was suppose to have appointment tomorrow. Will call him to have pt be seen here  As per pt takes oxycodone 10mg PO 4x a day  Verified medications with pts pharmacy ([facility]), receives oxycodone 10mg PO 4x a day, zohydro ER (hydrocodone) 10mg BID, in addition to gabapentin 300mg PO QD and lyrica 75mg PO BID. As per pt, follows with Dr. Xiong. Was suppose to have appointment tomorrow. Will call him to have pt be seen here  Verified medications with pts pharmacy ([facility]), receives oxycodone 10mg PO 4x a day, zohydro ER (hydrocodone) 10mg BID, in addition to gabapentin 300mg PO QD and lyrica 75mg PO BID.  Will restart pts o/p meds, however we do not carry hydrocodone. Will continue with morphine PRN Will continue with coreg 12.5 BID  Verified her medications with pharmacy, pt has not filled her coreg prescription with them since september 2017. Pharmacy reports she last filled coreg 25mg BID september 2017..  Will continue with coreg 12.5mg BID for now, monitor BP. Currently normotensive but if elevated will start coreg 25mg BID.

## 2018-04-10 NOTE — PROGRESS NOTE ADULT - PROBLEM SELECTOR PLAN 7
c/w ASA, BB As per pt, follows with Dr. Xiong. Was suppose to have appointment tomorrow. Will call him to have pt be seen here  Verified medications with pts pharmacy ([facility]), receives oxycodone 10mg PO 4x a day, zohydro ER (hydrocodone) 10mg BID, in addition to gabapentin 300mg PO QD and lyrica 75mg PO BID.  Will restart pts o/p meds, however we do not carry hydrocodone. Will continue with morphine PRN

## 2018-04-10 NOTE — PROGRESS NOTE ADULT - PROBLEM SELECTOR PLAN 2
Unclear etiology  Pts abdominal pain is non specific. May be related to pts right lower lobe PNA?   Pt with hx of prior SBO requiring surgery last year  CT abdo/pelvis shows PNA and diverticulosis   Pt gives surgical hx of appendectomy/cholescystecomy and complete hysterectomy   Surgery consulted, no evidence of SBO/surgical indications  Pt moving bowels, complains of diarrhea (3 episodes so far)  Follow up c.diff Unclear etiology  Pts abdominal pain is non specific. May be related to pts right lower lobe PNA? Given extensive abdominal surgery hx of appendectomy, cholecystectomy, complete hysterectomy abd SBR concern for adhesions.   Pt with hx of prior SBO requiring surgery last year  CT abdo/pelvis shows PNA and diverticulosis   Pt gives surgical hx of appendectomy/cholescystecomy and complete hysterectomy   Surgery consulted, no evidence of SBO/surgical indications.. will follow   Pt moving bowels, complains of diarrhea (3 episodes so far)  Follow up c.diff - start vancomycin 1gram IV Q 12hours   - follow up sensitivity results and repeat blood cx in 2 days

## 2018-04-10 NOTE — PROGRESS NOTE ADULT - ASSESSMENT
62 y/o female with PMH CAD, pacemaker HTN, HLD, chronic low back pain, hx of ovarian cancer, hx of bowel obstruction requiring surgery 1 yr ago who came to ER for worsening RUQ abdominal pain, 8/10, constant, assocated with n/v and temp of 102 orally last night.  In the ER, leukocytosis on labs, ct abdomen did not show bowl obstruction but has shown rt. middle and lower lobe infiltrate. CT chest: multifocal low right upper lobe and right lower lobe airspace consolidations concerning for infectious pneumonia. Pt started on IV abx and IVF. Consulted by surgery, no SBO indications/surgical indications. 62 y/o female with PMH CAD, pacemaker HTN, HLD, chronic low back pain, hx of ovarian cancer s/o complete hysterectomy, appendectomy, cholecystectomy, hx of bowel obstruction requiring SBR 1 yr ago who came to ER for worsening RUQ abdominal pain, 8/10, constant, assocated with n/v and temp of 102 orally last night.  In the ER, leukocytosis on labs, ct abdomen did not show bowl obstruction but has shown rt. middle and lower lobe infiltrate. CT chest: multifocal low right upper lobe and right lower lobe airspace consolidations concerning for infectious pneumonia. Pt started on IV abx. Consulted by surgery, no SBO indications/surgical indications. Side note: **was seen by behavioral health on last admission for psychosocial stressors, started on lexapro, as per pt does not take anymore.

## 2018-04-10 NOTE — PROGRESS NOTE ADULT - PROBLEM SELECTOR PLAN 4
Currently resolved  Tolerating diet  Will give 1L IV fluids to rehydrate due to multiple reported episodes of vomit at home  Zofran PRN Currently resolved  Tolerating PO diet  Will give 1L IV fluids to rehydrate due to multiple reported episodes of vomit at home  Zofran PRN Unclear etiology  Pts abdominal pain is non specific. May be related to pts right lower lobe PNA? Given extensive abdominal surgery hx of appendectomy, cholecystectomy, complete hysterectomy abd SBR concern for adhesions.   Pt with hx of prior SBO requiring surgery last year  CT abdo/pelvis shows PNA and diverticulosis   Pt gives surgical hx of appendectomy/cholescystecomy and complete hysterectomy   Surgery consulted, no evidence of SBO/surgical indications.. will follow   Pt moving bowels, complains of diarrhea (3 episodes so far)  Follow up c.diff

## 2018-04-11 LAB
ALBUMIN SERPL ELPH-MCNC: 2.9 G/DL — LOW (ref 3.3–5.2)
ALP SERPL-CCNC: 90 U/L — SIGNIFICANT CHANGE UP (ref 40–120)
ALT FLD-CCNC: 23 U/L — SIGNIFICANT CHANGE UP
ANION GAP SERPL CALC-SCNC: 10 MMOL/L — SIGNIFICANT CHANGE UP (ref 5–17)
AST SERPL-CCNC: 14 U/L — SIGNIFICANT CHANGE UP
BASOPHILS # BLD AUTO: 0 K/UL — SIGNIFICANT CHANGE UP (ref 0–0.2)
BASOPHILS NFR BLD AUTO: 0.3 % — SIGNIFICANT CHANGE UP (ref 0–2)
BILIRUB SERPL-MCNC: 0.4 MG/DL — SIGNIFICANT CHANGE UP (ref 0.4–2)
BUN SERPL-MCNC: 16 MG/DL — SIGNIFICANT CHANGE UP (ref 8–20)
CALCIUM SERPL-MCNC: 8.4 MG/DL — LOW (ref 8.6–10.2)
CHLORIDE SERPL-SCNC: 100 MMOL/L — SIGNIFICANT CHANGE UP (ref 98–107)
CO2 SERPL-SCNC: 26 MMOL/L — SIGNIFICANT CHANGE UP (ref 22–29)
CREAT SERPL-MCNC: 0.84 MG/DL — SIGNIFICANT CHANGE UP (ref 0.5–1.3)
EOSINOPHIL # BLD AUTO: 0.3 K/UL — SIGNIFICANT CHANGE UP (ref 0–0.5)
EOSINOPHIL NFR BLD AUTO: 3.9 % — SIGNIFICANT CHANGE UP (ref 0–6)
GLUCOSE SERPL-MCNC: 132 MG/DL — HIGH (ref 70–115)
HCT VFR BLD CALC: 27.4 % — LOW (ref 37–47)
HGB BLD-MCNC: 8.4 G/DL — LOW (ref 12–16)
LEGIONELLA AG UR QL: NEGATIVE — SIGNIFICANT CHANGE UP
LYMPHOCYTES # BLD AUTO: 1.6 K/UL — SIGNIFICANT CHANGE UP (ref 1–4.8)
LYMPHOCYTES # BLD AUTO: 20.3 % — SIGNIFICANT CHANGE UP (ref 20–55)
MCHC RBC-ENTMCNC: 19.6 PG — LOW (ref 27–31)
MCHC RBC-ENTMCNC: 30.7 G/DL — LOW (ref 32–36)
MCV RBC AUTO: 64 FL — LOW (ref 81–99)
MONOCYTES # BLD AUTO: 0.5 K/UL — SIGNIFICANT CHANGE UP (ref 0–0.8)
MONOCYTES NFR BLD AUTO: 5.8 % — SIGNIFICANT CHANGE UP (ref 3–10)
NEUTROPHILS # BLD AUTO: 5.6 K/UL — SIGNIFICANT CHANGE UP (ref 1.8–8)
NEUTROPHILS NFR BLD AUTO: 69.6 % — SIGNIFICANT CHANGE UP (ref 37–73)
PLATELET # BLD AUTO: 176 K/UL — SIGNIFICANT CHANGE UP (ref 150–400)
POTASSIUM SERPL-MCNC: 3.6 MMOL/L — SIGNIFICANT CHANGE UP (ref 3.5–5.3)
POTASSIUM SERPL-SCNC: 3.6 MMOL/L — SIGNIFICANT CHANGE UP (ref 3.5–5.3)
PROT SERPL-MCNC: 5.8 G/DL — LOW (ref 6.6–8.7)
RBC # BLD: 4.28 M/UL — LOW (ref 4.4–5.2)
RBC # FLD: 16.4 % — HIGH (ref 11–15.6)
SODIUM SERPL-SCNC: 136 MMOL/L — SIGNIFICANT CHANGE UP (ref 135–145)
WBC # BLD: 8 K/UL — SIGNIFICANT CHANGE UP (ref 4.8–10.8)
WBC # FLD AUTO: 8 K/UL — SIGNIFICANT CHANGE UP (ref 4.8–10.8)

## 2018-04-11 PROCEDURE — 99233 SBSQ HOSP IP/OBS HIGH 50: CPT

## 2018-04-11 RX ORDER — SACCHAROMYCES BOULARDII 250 MG
250 POWDER IN PACKET (EA) ORAL
Qty: 0 | Refills: 0 | Status: DISCONTINUED | OUTPATIENT
Start: 2018-04-11 | End: 2018-04-16

## 2018-04-11 RX ORDER — PIPERACILLIN AND TAZOBACTAM 4; .5 G/20ML; G/20ML
3.38 INJECTION, POWDER, LYOPHILIZED, FOR SOLUTION INTRAVENOUS EVERY 8 HOURS
Qty: 0 | Refills: 0 | Status: DISCONTINUED | OUTPATIENT
Start: 2018-04-11 | End: 2018-04-13

## 2018-04-11 RX ADMIN — OXYCODONE HYDROCHLORIDE 10 MILLIGRAM(S): 5 TABLET ORAL at 06:01

## 2018-04-11 RX ADMIN — PIPERACILLIN AND TAZOBACTAM 25 GRAM(S): 4; .5 INJECTION, POWDER, LYOPHILIZED, FOR SOLUTION INTRAVENOUS at 22:15

## 2018-04-11 RX ADMIN — CARVEDILOL PHOSPHATE 12.5 MILLIGRAM(S): 80 CAPSULE, EXTENDED RELEASE ORAL at 17:47

## 2018-04-11 RX ADMIN — OXYCODONE HYDROCHLORIDE 10 MILLIGRAM(S): 5 TABLET ORAL at 00:19

## 2018-04-11 RX ADMIN — Medication 250 MILLIGRAM(S): at 06:01

## 2018-04-11 RX ADMIN — OXYCODONE HYDROCHLORIDE 10 MILLIGRAM(S): 5 TABLET ORAL at 12:29

## 2018-04-11 RX ADMIN — CARVEDILOL PHOSPHATE 12.5 MILLIGRAM(S): 80 CAPSULE, EXTENDED RELEASE ORAL at 06:01

## 2018-04-11 RX ADMIN — PIPERACILLIN AND TAZOBACTAM 25 GRAM(S): 4; .5 INJECTION, POWDER, LYOPHILIZED, FOR SOLUTION INTRAVENOUS at 14:09

## 2018-04-11 RX ADMIN — Medication 250 MILLIGRAM(S): at 17:47

## 2018-04-11 RX ADMIN — OXYCODONE HYDROCHLORIDE 10 MILLIGRAM(S): 5 TABLET ORAL at 00:49

## 2018-04-11 RX ADMIN — OXYCODONE HYDROCHLORIDE 10 MILLIGRAM(S): 5 TABLET ORAL at 17:41

## 2018-04-11 RX ADMIN — Medication 100 MILLIGRAM(S): at 12:27

## 2018-04-11 RX ADMIN — OXYCODONE HYDROCHLORIDE 10 MILLIGRAM(S): 5 TABLET ORAL at 18:50

## 2018-04-11 RX ADMIN — ENOXAPARIN SODIUM 40 MILLIGRAM(S): 100 INJECTION SUBCUTANEOUS at 11:10

## 2018-04-11 RX ADMIN — OXYCODONE HYDROCHLORIDE 10 MILLIGRAM(S): 5 TABLET ORAL at 07:13

## 2018-04-11 RX ADMIN — OXYCODONE HYDROCHLORIDE 10 MILLIGRAM(S): 5 TABLET ORAL at 11:09

## 2018-04-11 NOTE — PROGRESS NOTE ADULT - PROBLEM SELECTOR PLAN 4
Will continue with coreg 12.5 BID  Verified her medications with pharmacy, pt has not filled her coreg prescription with them since september 2017. Pharmacy reports she last filled coreg 25mg BID september 2017..  Will continue with coreg 12.5mg BID for now, monitor BP. Currently normotensive but if elevated will start coreg 25mg BID.

## 2018-04-11 NOTE — PROGRESS NOTE ADULT - PROBLEM SELECTOR PLAN 5
As per pt, follows with Dr. Xiong. Was suppose to have appointment tomorrow. Will call him to have pt be seen here  pts pharmacy (Loc[facility]), receives oxycodone 10mg PO 4x a day, zohydro ER (hydrocodone) 10mg BID, in addition to gabapentin 300mg PO QD and lyrica 75mg PO BID.  Will restart pts o/p meds, however we do not carry hydrocodone. Will continue with morphine PRN

## 2018-04-11 NOTE — PROGRESS NOTE ADULT - ASSESSMENT
62 y/o female with PMH CAD, pacemaker HTN, HLD, chronic low back pain, hx of ovarian cancer s/o complete hysterectomy, appendectomy, cholecystectomy, hx of bowel obstruction requiring SBR 1 yr ago who came to ER for worsening RUQ abdominal pain, 8/10, constant, assocated with n/v and temp of 102 orally last night.  In the ER, leukocytosis on labs, ct abdomen did not show bowl obstruction but has shown rt. middle and lower lobe infiltrate. CT chest: multifocal low right upper lobe and right lower lobe airspace consolidations concerning for infectious pneumonia. Pt started on IV abx. Consulted by surgery, no SBO indications/surgical indications. Side note: **was seen by behavioral health on last admission for psychosocial stressors, started on lexapro, as per pt does not take anymore.

## 2018-04-11 NOTE — PROGRESS NOTE ADULT - SUBJECTIVE AND OBJECTIVE BOX
DAMEON GOMEZ Patient is a 63y old  Female who presents with a chief complaint of pneumonia (10 Apr 2018 01:18)     HPI:  64 y/o female came to ER, sent in by her surgeon as she started  abdominal pain which was more in the rt. upper to lower quadrant, 8 out of 10 and constant, associated with n/v. pt. reports fever of 102 last night . no diarrhea. had a loose BM this am. pt. has noticed lack of flatus. As per pt. her sympt. were similar to her  prior bowl obstruction for which she got surgery about 1 year ago. pt. reports no cough. no phlegm no sick contact. no swallowing problem. no flu like sympt. Pt. has chronic low back pain and is not very mobile person. In the ER pt's ct abdomen did not show bowl obstruction but has shown rt. middle and lower lobe infiltrate. no cp. no sob. pt. was given food in the ER which she tolerated and reports no n/v. pt. reports that she does not take escitalopram any more. (2018 23:11)      Allergies    Dilaudid (Nausea)  peanuts (Hives)        HEALTH ISSUES - PROBLEM Dx:  Prophylactic measure: Prophylactic measure  Leukocytosis: Leukocytosis  Bacteremia due to Gram-positive bacteria: Bacteremia due to Gram-positive bacteria  CAD (coronary artery disease): CAD (coronary artery disease)  Low back pain with left-sided sciatica, unspecified back pain laterality, unspecified chronicity: Low back pain with left-sided sciatica, unspecified back pain laterality, unspecified chronicity  Diarrhea: Diarrhea  Back pain: Back pain  Nausea & vomiting: Nausea &amp; vomiting  Essential hypertension: Essential hypertension  Leukocytosis, unspecified type: Leukocytosis, unspecified type  Pneumonia of right lower lobe due to infectious organism: Pneumonia of right lower lobe due to infectious organism  Abdominal pain, unspecified abdominal location: Abdominal pain, unspecified abdominal location        PAST MEDICAL & SURGICAL HISTORY:  Varicose veins: left lower extremity  Pacemaker: reports shut off  Urinary incontinence, unspecified type  Low back pain with left-sided sciatica, unspecified back pain laterality, unspecified chronicity  Malignant neoplasm of ovary, unspecified laterality  Stented coronary artery  CAD (coronary artery disease)  Thalassemia, unspecified type  CVA (cerebral vascular accident)  HTN (hypertension)  S/P cholecystectomy  History of appendectomy  S/P spinal surgery: S/P spinal cord stimulator placed.  Artificial cardiac pacemaker: Reports &quot;shut off.&quot;  H/O abdominal hysterectomy  H/O laminectomy          Vital Signs Last 24 Hrs  T(C): 37 (2018 08:06), Max: 37 (2018 08:06)  T(F): 98.6 (2018 08:06), Max: 98.6 (2018 08:06)  HR: 76 (2018 08:06) (68 - 76)  BP: 175/76 (2018 08:06) (166/80 - 190/100)  BP(mean): --  RR: 19 (2018 08:06) (18 - 19)  SpO2: 98% (10 Apr 2018 23:20) (98% - 98%)T(C): 37 (18 @ 08:06), Max: 37 (18 @ 08:06)  HR: 76 (18 @ 08:06) (68 - 76)  BP: 175/76 (18 @ 08:06) (166/80 - 190/100)  RR: 19 (18 @ 08:06) (18 - 19)  SpO2: 98% (04-10-18 @ 23:20) (98% - 98%)  Wt(kg): --          acetaminophen   Tablet 650 milliGRAM(s) Oral every 6 hours PRN  ALBUTerol    90 MICROgram(s) HFA Inhaler 1 Puff(s) Inhalation every 4 hours  ALBUTerol/ipratropium for Nebulization 3 milliLiter(s) Nebulizer every 6 hours PRN  carvedilol 12.5 milliGRAM(s) Oral every 12 hours  docusate sodium 100 milliGRAM(s) Oral two times a day  enoxaparin Injectable 40 milliGRAM(s) SubCutaneous daily  gabapentin 300 milliGRAM(s) Oral daily  morphine  - Injectable 2 milliGRAM(s) IV Push every 6 hours PRN  ondansetron Injectable 4 milliGRAM(s) IV Push every 6 hours PRN  oxyCODONE    IR 10 milliGRAM(s) Oral four times a day  pantoprazole    Tablet 40 milliGRAM(s) Oral before breakfast  piperacillin/tazobactam IVPB. 3.375 Gram(s) IV Intermittent every 8 hours  pregabalin 75 milliGRAM(s) Oral two times a day  saccharomyces boulardii 250 milliGRAM(s) Oral two times a day  tiotropium 18 MICROgram(s) Capsule 1 Capsule(s) Inhalation daily      LABS:                          8.4    8.0   )-----------( 176      ( 2018 07:28 )             27.4     04-    136  |  100  |  16.0  ----------------------------<  132<H>  3.6   |  26.0  |  0.84    Ca    8.4<L>      2018 07:28    TPro  5.8<L>  /  Alb  2.9<L>  /  TBili  0.4  /  DBili  x   /  AST  14  /  ALT  23  /  AlkPhos  90  04-11    LIVER FUNCTIONS - ( 2018 07:28 )  Alb: 2.9 g/dL / Pro: 5.8 g/dL / ALK PHOS: 90 U/L / ALT: 23 U/L / AST: 14 U/L / GGT: x           PT/INR - ( 2018 17:33 )   PT: 12.6 sec;   INR: 1.14 ratio               Urinalysis Basic - ( 2018 19:46 )    Color: Yellow / Appearance: Clear / S.015 / pH: x  Gluc: x / Ketone: Negative  / Bili: Negative / Urobili: Negative mg/dL   Blood: x / Protein: 30 mg/dL / Nitrite: Negative   Leuk Esterase: Trace / RBC: 0-2 /HPF / WBC 0-2   Sq Epi: x / Non Sq Epi: Few / Bacteria: Few      CAPILLARY BLOOD GLUCOSE          RADIOLOGY & ADDITIONAL TESTS:      Consultant notes reviewed    Case discussed with consultant/provider/ family /patient DAMEON GOMEZ Patient is a 63y old  Female who presents with a chief complaint of pneumonia (10 Apr 2018 01:18)     HPI:  62 y/o female came to ER, sent in by her surgeon as she started  abdominal pain which was more in the rt. upper to lower quadrant, 8 out of 10 and constant, associated with n/v. pt. reports fever of 102 last night . no diarrhea. had a loose BM this am. pt. has noticed lack of flatus. As per pt. her sympt. were similar to her  prior bowl obstruction for which she got surgery about 1 year ago. pt. reports no cough. no phlegm no sick contact. no swallowing problem. no flu like sympt. Pt. has chronic low back pain and is not very mobile person. In the ER pt's ct abdomen did not show bowl obstruction but has shown rt. middle and lower lobe infiltrate. no cp. no sob. pt. was given food in the ER which she tolerated and reports no n/v. pt. reports that she does not take escitalopram any more. (2018 23:11)      Allergies    Dilaudid (Nausea)  peanuts (Hives)        HEALTH ISSUES - PROBLEM Dx:  Prophylactic measure: Prophylactic measure  Leukocytosis: Leukocytosis  Bacteremia due to Gram-positive bacteria: Bacteremia due to Gram-positive bacteria  CAD (coronary artery disease): CAD (coronary artery disease)  Low back pain with left-sided sciatica, unspecified back pain laterality, unspecified chronicity: Low back pain with left-sided sciatica, unspecified back pain laterality, unspecified chronicity  Diarrhea: Diarrhea  Back pain: Back pain  Nausea & vomiting: Nausea &amp; vomiting  Essential hypertension: Essential hypertension  Leukocytosis, unspecified type: Leukocytosis, unspecified type  Pneumonia of right lower lobe due to infectious organism: Pneumonia of right lower lobe due to infectious organism  Abdominal pain, unspecified abdominal location: Abdominal pain, unspecified abdominal location        PAST MEDICAL & SURGICAL HISTORY:  Varicose veins: left lower extremity  Pacemaker: reports shut off  Urinary incontinence, unspecified type  Low back pain with left-sided sciatica, unspecified back pain laterality, unspecified chronicity  Malignant neoplasm of ovary, unspecified laterality  Stented coronary artery  CAD (coronary artery disease)  Thalassemia, unspecified type  CVA (cerebral vascular accident)  HTN (hypertension)  S/P cholecystectomy  History of appendectomy  S/P spinal surgery: S/P spinal cord stimulator placed.  Artificial cardiac pacemaker: Reports &quot;shut off.&quot;  H/O abdominal hysterectomy  H/O laminectomy          Vital Signs Last 24 Hrs  T(C): 37 (2018 08:06), Max: 37 (2018 08:06)  T(F): 98.6 (2018 08:06), Max: 98.6 (2018 08:06)  HR: 76 (2018 08:06) (68 - 76)  BP: 175/76 (2018 08:06) (166/80 - 190/100)  BP(mean): --  RR: 19 (2018 08:06) (18 - 19)  SpO2: 98% (10 Apr 2018 23:20) (98% - 98%)T(C): 37 (18 @ 08:06), Max: 37 (18 @ 08:06)  HR: 76 (18 @ 08:06) (68 - 76)  BP: 175/76 (18 @ 08:06) (166/80 - 190/100)  RR: 19 (18 @ 08:06) (18 - 19)  SpO2: 98% (04-10-18 @ 23:20) (98% - 98%)  Wt(kg): --          acetaminophen   Tablet 650 milliGRAM(s) Oral every 6 hours PRN  ALBUTerol    90 MICROgram(s) HFA Inhaler 1 Puff(s) Inhalation every 4 hours  ALBUTerol/ipratropium for Nebulization 3 milliLiter(s) Nebulizer every 6 hours PRN  carvedilol 12.5 milliGRAM(s) Oral every 12 hours  docusate sodium 100 milliGRAM(s) Oral two times a day  enoxaparin Injectable 40 milliGRAM(s) SubCutaneous daily  gabapentin 300 milliGRAM(s) Oral daily  morphine  - Injectable 2 milliGRAM(s) IV Push every 6 hours PRN  ondansetron Injectable 4 milliGRAM(s) IV Push every 6 hours PRN  oxyCODONE    IR 10 milliGRAM(s) Oral four times a day  pantoprazole    Tablet 40 milliGRAM(s) Oral before breakfast  piperacillin/tazobactam IVPB. 3.375 Gram(s) IV Intermittent every 8 hours  pregabalin 75 milliGRAM(s) Oral two times a day  saccharomyces boulardii 250 milliGRAM(s) Oral two times a day  tiotropium 18 MICROgram(s) Capsule 1 Capsule(s) Inhalation daily      LABS:                          8.4    8.0   )-----------( 176      ( 2018 07:28 )             27.4     04-    136  |  100  |  16.0  ----------------------------<  132<H>  3.6   |  26.0  |  0.84    Ca    8.4<L>      2018 07:28    TPro  5.8<L>  /  Alb  2.9<L>  /  TBili  0.4  /  DBili  x   /  AST  14  /  ALT  23  /  AlkPhos  90  04-11    LIVER FUNCTIONS - ( 2018 07:28 )  Alb: 2.9 g/dL / Pro: 5.8 g/dL / ALK PHOS: 90 U/L / ALT: 23 U/L / AST: 14 U/L / GGT: x           PT/INR - ( 2018 17:33 )   PT: 12.6 sec;   INR: 1.14 ratio               Urinalysis Basic - ( 2018 19:46 )    Color: Yellow / Appearance: Clear / S.015 / pH: x  Gluc: x / Ketone: Negative  / Bili: Negative / Urobili: Negative mg/dL   Blood: x / Protein: 30 mg/dL / Nitrite: Negative   Leuk Esterase: Trace / RBC: 0-2 /HPF / WBC 0-2   Sq Epi: x / Non Sq Epi: Few / Bacteria: Few      CAPILLARY BLOOD GLUCOSE          RADIOLOGY & ADDITIONAL TESTS:      Consultant notes reviewed

## 2018-04-11 NOTE — PROGRESS NOTE ADULT - SUBJECTIVE AND OBJECTIVE BOX
DAMEON GOMEZ  ----------------------------------------  CC: Follow up visit for PNA    Feels Much improved  refused Blood cultures, But finally agreed, as Initial blood cx reported Gram Positive cocci      Vital Signs Last 24 Hrs  T(C): 37 (2018 08:06), Max: 37 (2018 08:06)  T(F): 98.6 (2018 08:06), Max: 98.6 (2018 08:06)  HR: 76 (2018 08:06) (68 - 76)  BP: 175/76 (2018 08:06) (166/80 - 190/100)  BP(mean): --  RR: 19 (2018 08:06) (18 - 19)  SpO2: 98% (10 Apr 2018 23:20) (98% - 98%)    CAPILLARY BLOOD GLUCOSE        PHYSICAL EXAMINATION:  ----------------------------------------  General appearance: NAD, Awake, Alert  HEENT: NCAT, Conjunctiva clear, EOMI  Neck: Supple, No JVD  Lungs: decreased Breath sounds  bilaterally  Cardiovascular: S1S2, Regular rhythm  Abdomen: Soft, Nontender, Positive bowel sounds  Extremities: No clubbing, No cyanosis, No edema  CNS: alert      LABORATORY STUDIES:  ----------------------------------------                        8.4    8.0   )-----------( 176      ( 2018 07:28 )             27.4     04-11    136  |  100  |  16.0  ----------------------------<  132<H>  3.6   |  26.0  |  0.84    Ca    8.4<L>      2018 07:28    TPro  5.8<L>  /  Alb  2.9<L>  /  TBili  0.4  /  DBili  x   /  AST  14  /  ALT  23  /  AlkPhos  90  04-11    LIVER FUNCTIONS - ( 2018 07:28 )  Alb: 2.9 g/dL / Pro: 5.8 g/dL / ALK PHOS: 90 U/L / ALT: 23 U/L / AST: 14 U/L / GGT: x           PT/INR - ( 2018 17:33 )   PT: 12.6 sec;   INR: 1.14 ratio         Urinalysis Basic - ( 2018 19:46 )    Color: Yellow / Appearance: Clear / S.015 / pH: x  Gluc: x / Ketone: Negative  / Bili: Negative / Urobili: Negative mg/dL   Blood: x / Protein: 30 mg/dL / Nitrite: Negative   Leuk Esterase: Trace / RBC: 0-2 /HPF / WBC 0-2   Sq Epi: x / Non Sq Epi: Few / Bacteria: Few      Culture - Blood (collected 2018 21:04)  Source: .Blood Blood  Preliminary Report (10 Apr 2018 18:40):    Growth in aerobic and anaerobic bottles: Gram Positive Cocci in Clusters    Aerobic Bottle: 20.03 Hours to positivity    Anaerobic Bottle: 21.03 Hours to positivity    .    TYPE: (C=Critical, N=Notification, A=Abnormal) C    TESTS:  _ bld gs    DATE/TIME CALLED: _ 04/10/2018 18:39:54    CALLED TO: Rico ramirez    READ BACK (2 Patient Identifiers)(Y/N): _ y    READ BACK VALUES (Y/N): _ y    CALLED BY: _ kb    .    ***Blood Panel PCR results on this specimen are available    approximately 3 hours after the Gram stain result.***    Gram stain, PCR, and/or culture results may not always    correspond due to difference in methodologies.    ************************************************************    This PCR assay was performed using [employer].    The followingtargets are tested for: Enterococcus,    vancomycin resistant enterococci, Listeria monocytogenes,    coagulase negative staphylococci, S. aureus,    methicillin resistant S. aureus, Streptococcus agalactiae    (Group B), S. pneumoniae, S. pyogenes (Group A),    Acinetobacter baumannii, Enterobacter cloacae, E. coli,    Klebsiella oxytoca, K. pneumoniae, Proteus sp.,    Serratia marcescens, Haemophilus influenzae,    Neisseria meningitidis, Pseudomonas aeruginosa, Candida    albicans, C. glabrata, C krusei, C parapsilosis,    C. tropicalis and the KPC resistance gene.    "Due to technical problems, Proteus sp. will Not be reported as part of    the BCID panel until further notice"  Organism: Blood Culture PCR (10 Apr 2018 18:48)  Organism: Blood Culture PCR (10 Apr 2018 18:48)      MEDICATIONS  (STANDING):      ALBUTerol    90 MICROgram(s) HFA Inhaler 1 Puff(s) Inhalation every 4 hours  carvedilol 12.5 milliGRAM(s) Oral every 12 hours  docusate sodium 100 milliGRAM(s) Oral two times a day  enoxaparin Injectable 40 milliGRAM(s) SubCutaneous daily  gabapentin 300 milliGRAM(s) Oral daily  oxyCODONE    IR 10 milliGRAM(s) Oral four times a day  pantoprazole    Tablet 40 milliGRAM(s) Oral before breakfast  piperacillin/tazobactam IVPB. 3.375 Gram(s) IV Intermittent every 8 hours  pregabalin 75 milliGRAM(s) Oral two times a day  saccharomyces boulardii 250 milliGRAM(s) Oral two times a day  tiotropium 18 MICROgram(s) Capsule 1 Capsule(s) Inhalation daily    MEDICATIONS  (PRN):  acetaminophen   Tablet 650 milliGRAM(s) Oral every 6 hours PRN For Temp greater than or equal to 38 C (100.4 F) / mild to moderate pain.  ALBUTerol/ipratropium for Nebulization 3 milliLiter(s) Nebulizer every 6 hours PRN Shortness of Breath and/or Wheezing  morphine  - Injectable 2 milliGRAM(s) IV Push every 6 hours PRN Severe Pain (7 - 10)  ondansetron Injectable 4 milliGRAM(s) IV Push every 6 hours PRN Nausea and/or Vomiting      ASSESSMENT / PLAN:  ----------------------------------------

## 2018-04-11 NOTE — PROGRESS NOTE ADULT - PROBLEM SELECTOR PLAN 2
- stop vancomycin 1gram IV Q 12hours , as Blood cx : Coag Neg staph, Likely contaminant   repeat blood cx sent today, and if neg, can D/C home

## 2018-04-12 LAB
-  AMPICILLIN/SULBACTAM: SIGNIFICANT CHANGE UP
-  CEFAZOLIN: SIGNIFICANT CHANGE UP
-  CIPROFLOXACIN: SIGNIFICANT CHANGE UP
-  CLINDAMYCIN: SIGNIFICANT CHANGE UP
-  ERYTHROMYCIN: SIGNIFICANT CHANGE UP
-  GENTAMICIN: SIGNIFICANT CHANGE UP
-  LEVOFLOXACIN: SIGNIFICANT CHANGE UP
-  MOXIFLOXACIN(AEROBIC): SIGNIFICANT CHANGE UP
-  OXACILLIN: SIGNIFICANT CHANGE UP
-  PENICILLIN: SIGNIFICANT CHANGE UP
-  RIFAMPIN: SIGNIFICANT CHANGE UP
-  TETRACYCLINE: SIGNIFICANT CHANGE UP
-  TRIMETHOPRIM/SULFAMETHOXAZOLE: SIGNIFICANT CHANGE UP
-  VANCOMYCIN: SIGNIFICANT CHANGE UP
CULTURE RESULTS: SIGNIFICANT CHANGE UP
HCT VFR BLD CALC: 27.5 % — LOW (ref 37–47)
HGB BLD-MCNC: 8.4 G/DL — LOW (ref 12–16)
MCHC RBC-ENTMCNC: 19.4 PG — LOW (ref 27–31)
MCHC RBC-ENTMCNC: 30.5 G/DL — LOW (ref 32–36)
MCV RBC AUTO: 63.4 FL — LOW (ref 81–99)
METHOD TYPE: SIGNIFICANT CHANGE UP
ORGANISM # SPEC MICROSCOPIC CNT: SIGNIFICANT CHANGE UP
PLATELET # BLD AUTO: 200 K/UL — SIGNIFICANT CHANGE UP (ref 150–400)
RBC # BLD: 4.34 M/UL — LOW (ref 4.4–5.2)
RBC # FLD: 16.1 % — HIGH (ref 11–15.6)
SPECIMEN SOURCE: SIGNIFICANT CHANGE UP
WBC # BLD: 6.3 K/UL — SIGNIFICANT CHANGE UP (ref 4.8–10.8)
WBC # FLD AUTO: 6.3 K/UL — SIGNIFICANT CHANGE UP (ref 4.8–10.8)

## 2018-04-12 PROCEDURE — 36000 PLACE NEEDLE IN VEIN: CPT

## 2018-04-12 PROCEDURE — 99232 SBSQ HOSP IP/OBS MODERATE 35: CPT

## 2018-04-12 RX ORDER — METHOCARBAMOL 500 MG/1
500 TABLET, FILM COATED ORAL THREE TIMES A DAY
Qty: 0 | Refills: 0 | Status: DISCONTINUED | OUTPATIENT
Start: 2018-04-12 | End: 2018-04-16

## 2018-04-12 RX ORDER — OXYCODONE HYDROCHLORIDE 5 MG/1
5 TABLET ORAL EVERY 4 HOURS
Qty: 0 | Refills: 0 | Status: DISCONTINUED | OUTPATIENT
Start: 2018-04-12 | End: 2018-04-16

## 2018-04-12 RX ORDER — LIDOCAINE 4 G/100G
1 CREAM TOPICAL DAILY
Qty: 0 | Refills: 0 | Status: DISCONTINUED | OUTPATIENT
Start: 2018-04-12 | End: 2018-04-16

## 2018-04-12 RX ORDER — OXYCODONE HYDROCHLORIDE 5 MG/1
10 TABLET ORAL EVERY 4 HOURS
Qty: 0 | Refills: 0 | Status: DISCONTINUED | OUTPATIENT
Start: 2018-04-12 | End: 2018-04-16

## 2018-04-12 RX ORDER — CARVEDILOL PHOSPHATE 80 MG/1
25 CAPSULE, EXTENDED RELEASE ORAL EVERY 12 HOURS
Qty: 0 | Refills: 0 | Status: DISCONTINUED | OUTPATIENT
Start: 2018-04-12 | End: 2018-04-16

## 2018-04-12 RX ADMIN — ENOXAPARIN SODIUM 40 MILLIGRAM(S): 100 INJECTION SUBCUTANEOUS at 11:10

## 2018-04-12 RX ADMIN — OXYCODONE HYDROCHLORIDE 10 MILLIGRAM(S): 5 TABLET ORAL at 11:10

## 2018-04-12 RX ADMIN — PIPERACILLIN AND TAZOBACTAM 25 GRAM(S): 4; .5 INJECTION, POWDER, LYOPHILIZED, FOR SOLUTION INTRAVENOUS at 22:58

## 2018-04-12 RX ADMIN — CARVEDILOL PHOSPHATE 25 MILLIGRAM(S): 80 CAPSULE, EXTENDED RELEASE ORAL at 18:14

## 2018-04-12 RX ADMIN — Medication 250 MILLIGRAM(S): at 18:14

## 2018-04-12 RX ADMIN — OXYCODONE HYDROCHLORIDE 10 MILLIGRAM(S): 5 TABLET ORAL at 20:50

## 2018-04-12 RX ADMIN — OXYCODONE HYDROCHLORIDE 10 MILLIGRAM(S): 5 TABLET ORAL at 00:08

## 2018-04-12 RX ADMIN — Medication 250 MILLIGRAM(S): at 06:09

## 2018-04-12 RX ADMIN — OXYCODONE HYDROCHLORIDE 10 MILLIGRAM(S): 5 TABLET ORAL at 11:46

## 2018-04-12 RX ADMIN — OXYCODONE HYDROCHLORIDE 10 MILLIGRAM(S): 5 TABLET ORAL at 01:47

## 2018-04-12 RX ADMIN — CARVEDILOL PHOSPHATE 12.5 MILLIGRAM(S): 80 CAPSULE, EXTENDED RELEASE ORAL at 06:09

## 2018-04-12 RX ADMIN — OXYCODONE HYDROCHLORIDE 10 MILLIGRAM(S): 5 TABLET ORAL at 19:50

## 2018-04-12 RX ADMIN — OXYCODONE HYDROCHLORIDE 10 MILLIGRAM(S): 5 TABLET ORAL at 08:30

## 2018-04-12 RX ADMIN — OXYCODONE HYDROCHLORIDE 10 MILLIGRAM(S): 5 TABLET ORAL at 15:39

## 2018-04-12 RX ADMIN — LIDOCAINE 1 PATCH: 4 CREAM TOPICAL at 12:44

## 2018-04-12 RX ADMIN — OXYCODONE HYDROCHLORIDE 10 MILLIGRAM(S): 5 TABLET ORAL at 06:09

## 2018-04-12 RX ADMIN — PIPERACILLIN AND TAZOBACTAM 25 GRAM(S): 4; .5 INJECTION, POWDER, LYOPHILIZED, FOR SOLUTION INTRAVENOUS at 15:31

## 2018-04-12 RX ADMIN — OXYCODONE HYDROCHLORIDE 10 MILLIGRAM(S): 5 TABLET ORAL at 16:15

## 2018-04-12 RX ADMIN — PIPERACILLIN AND TAZOBACTAM 25 GRAM(S): 4; .5 INJECTION, POWDER, LYOPHILIZED, FOR SOLUTION INTRAVENOUS at 06:10

## 2018-04-12 NOTE — PROGRESS NOTE ADULT - SUBJECTIVE AND OBJECTIVE BOX
Chief Complaint:    Patient seen and examined at bedside. Patient is well known to our practice. She is a patient of Dr. Xiong and sees DEBI Suggs in office. Patient states she originally came to ED with abdominal pain, later found to have pneumonia. She states that since hospitalization she reports exacerbation in low back pain with left LE radicular symptoms. She has a hsitory of back surgery and has been treated chronically with opiates and spinal interventions in our office. She is managed with oxycodone 10mg QID. States she is unable to tolerate side effects of lyrica or gabapentin. States pain is constant throbbing in nature, worse with movement. There is radiation down the left LE associated with tingling. She reports increased pain with any movement. Denies any bowel or bladder incontinence. No saddle anesthesia.  Patient requesting medication adjustment in hospital. Reports oxycodone is not lasting between doses. Pain currently 8/10.    Denies CP/SOB/nausea/vomiting/vision changes/headaches/dizziness/fevers/chills.    HPI:  62 y/o female came to ER, sent in by her surgeon as she started  abdominal pain which was more in the rt. upper to lower quadrant, 8 out of 10 and constant, associated with n/v. pt. reports fever of 102 last night . no diarrhea. had a loose BM this am. pt. has noticed lack of flatus. As per pt. her sympt. were similar to her  prior bowl obstruction for which she got surgery about 1 year ago. pt. reports no cough. no phlegm no sick contact. no swallowing problem. no flu like sympt. Pt. has chronic low back pain and is not very mobile person. In the ER pt's ct abdomen did not show bowl obstruction but has shown rt. middle and lower lobe infiltrate. no cp. no sob. pt. was given food in the ER which she tolerated and reports no n/v. pt. reports that she does not take escitalopram any more.     PAST MEDICAL & SURGICAL HISTORY:  Varicose veins: left lower extremity  Pacemaker: reports shut off  Urinary incontinence, unspecified type  Low back pain with left-sided sciatica, unspecified back pain laterality, unspecified chronicity  Malignant neoplasm of ovary, unspecified laterality  Stented coronary artery  CAD (coronary artery disease)  Thalassemia, unspecified type  CVA (cerebral vascular accident)  HTN (hypertension)  S/P cholecystectomy  History of appendectomy  S/P spinal surgery: S/P spinal cord stimulator placed.  Artificial cardiac pacemaker: Reports &quot;shut off.&quot;  H/O abdominal hysterectomy  H/O laminectomy      SOCIAL HISTORY:  [ ] Denies Smoking, Alcohol, or Drug Use    Allergies    Dilaudid (Nausea)  peanuts (Hives)    Intolerances        PAIN MEDICATIONS:  acetaminophen   Tablet 650 milliGRAM(s) Oral every 6 hours PRN  gabapentin 300 milliGRAM(s) Oral daily  morphine  - Injectable 2 milliGRAM(s) IV Push every 6 hours PRN  ondansetron Injectable 4 milliGRAM(s) IV Push every 6 hours PRN  oxyCODONE    IR 10 milliGRAM(s) Oral every 4 hours PRN  oxyCODONE    IR 5 milliGRAM(s) Oral every 4 hours PRN    Heme:  enoxaparin Injectable 40 milliGRAM(s) SubCutaneous daily    Antibiotics:  piperacillin/tazobactam IVPB. 3.375 Gram(s) IV Intermittent every 8 hours    Cardiac:  carvedilol 12.5 milliGRAM(s) Oral every 12 hours    Pulmonary:  ALBUTerol    90 MICROgram(s) HFA Inhaler 1 Puff(s) Inhalation every 4 hours  ALBUTerol/ipratropium for Nebulization 3 milliLiter(s) Nebulizer every 6 hours PRN  tiotropium 18 MICROgram(s) Capsule 1 Capsule(s) Inhalation daily    Endocrine    GI:  docusate sodium 100 milliGRAM(s) Oral two times a day  pantoprazole    Tablet 40 milliGRAM(s) Oral before breakfast    All Other Meds:  lidocaine   Patch 1 Patch Transdermal daily  saccharomyces boulardii 250 milliGRAM(s) Oral two times a day      LABS:                          8.4    8.0   )-----------( 176      ( 11 Apr 2018 07:28 )             27.4     04-11    136  |  100  |  16.0  ----------------------------<  132<H>  3.6   |  26.0  |  0.84    Ca    8.4<L>      11 Apr 2018 07:28    TPro  5.8<L>  /  Alb  2.9<L>  /  TBili  0.4  /  DBili  x   /  AST  14  /  ALT  23  /  AlkPhos  90  04-11          Drug Screen:        RADIOLOGY:    REVIEW OF SYSTEMS:  CONSTITUTIONAL: Neg  NECK: No pain or stiffness  RESPIRATORY: No cough, wheezing, chills or hemoptysis; No shortness of breath  CARDIOVASCULAR: No chest pain, palpitations, dizziness, or leg swelling  GASTROINTESTINAL: +epigastric/RUQ pain  GENITOURINARY: No dysuria, frequency, hematuria, or incontinence  NEUROLOGICAL: No headaches, memory loss, loss of strength, numbness, or tremors  SKIN: No itching, burning, rashes, or lesions   LYMPH NODES: No enlarged glands  MUSCULOSKELETAL: +Low back pain  PSYCHIATRIC: No depression, anxiety, mood swings, or difficulty sleeping  HEME/LYMPH: No easy bruising, or bleeding gums    Vital Signs Last 24 Hrs  T(C): 36.9 (12 Apr 2018 07:33), Max: 37.4 (11 Apr 2018 23:29)  T(F): 98.5 (12 Apr 2018 07:33), Max: 99.3 (11 Apr 2018 23:29)  HR: 73 (12 Apr 2018 08:33) (72 - 86)  BP: 185/72 (12 Apr 2018 08:33) (160/90 - 203/78)  BP(mean): --  RR: 18 (12 Apr 2018 07:33) (18 - 20)  SpO2: 95% (12 Apr 2018 07:33) (95% - 95%)    PAIN SCORE:     8/10    SCALE USED: (1-10)      PHYSICAL EXAM:    GENERAL: NAD, well-groomed, well-developed  HEAD:  Atraumatic, Normocephalic  EYES: EOMI, PERRLA, conjunctiva and sclera clear  ENMT: No tonsillar erythema, exudates, or enlargement; Moist mucous membranes, Good dentition, No lesions  NECK: Supple, No JVD, Normal thyroid  NERVOUS SYSTEM:  Alert & Oriented X3, Good concentration; Motor Strength 5/5 B/L upper and lower extremities; DTRs 2+ intact and symmetric  CHEST/LUNG: Clear to percussion bilaterally; nonlabored breathing  HEART: Regular rate and rhythm;   ABDOMEN: TTP epigastric region, RUQ, nondistended  EXTREMITIES:  2+ Peripheral Pulses, No clubbing, cyanosis, or edema  BACK: TTP lower lumbar spine;  +SLR left side  LYMPH: No lymphadenopathy noted  SKIN: No rashes or lesions      [x ]  [place]  Reviewed

## 2018-04-12 NOTE — PROGRESS NOTE ADULT - PROBLEM SELECTOR PLAN 6
- Pt with elevated BP persistently, will restart pts home dose according to her pharmacy which is coreg 25 mg BID. Observe/monitor HR/BP  - Verified her medications with pharmacy, pt has not filled her coreg prescription with them since september 2017. Pharmacy reports she last filled coreg 25mg BID september 2017..

## 2018-04-12 NOTE — PROGRESS NOTE ADULT - PROBLEM SELECTOR PLAN 2
- Vancomyocin was started then later discontinued  - Continue with zosyn  - follow up sensitivity results and repeat blood cx in 2 days (blood cultures drawn 4/11/18 .. should result tomorrow 4/13 and if negative discharge)

## 2018-04-12 NOTE — PROCEDURE NOTE - NSPROCDETAILS_GEN_ALL_CORE
When Your Child Has a Cold or Flu  Colds and influenza (flu) infect the upper respiratory tract. This includes the mouth, nose, nasal passages, and throat. Both illnesses are caused by germs called viruses, and both share some of the same symptoms.  But c
· Hand-to-mouth contact. Children are likely to touch their eyes, nose, or mouth without washing their hands. This is the most common way germs spread. How are colds and flu diagnosed?   Most often, healthcare providers diagnose a cold or the flu based on
· If your child is diagnosed with the flu, he or she may be given antiviral treatments that can reduce symptoms and shorten the length of illness. These treatments work best if they are started soon after your child shows symptoms.   Preventing colds and fl
· Signs of dehydration (such as a dry mouth, dark or strong-smelling urine or no urine output in 6 to 8 hours, and refusal to drink fluids)  · Trouble waking up  · Ear pain (in toddlers or teens)  · Sinus pain or pressure      Fever and children  Always us
© 3044-3755 [employer]. [address] [facility] [place], [facility] [place]. All rights reserved. This information is not intended as a substitute for professional medical care. Always follow your healthcare professional's instructions.
location identified, draped/prepped, sterile technique used/blood seen on insertion/dressing applied/flushes easily/secured in place/sterile technique, catheter placed
sterile technique, catheter placed/location identified, draped/prepped, sterile technique used/flushes easily/dressing applied/blood seen on insertion/secured in place

## 2018-04-12 NOTE — PROGRESS NOTE ADULT - NSHPATTENDINGPLANDISCUSS_GEN_ALL_CORE
Dr. Xiong
patient and Nursing staff. Anticipate Home in am, if repeat Cx neg
PA and patient at bedside

## 2018-04-12 NOTE — PROGRESS NOTE ADULT - PROBLEM SELECTOR PLAN 2
- stop vancomycin 1gram IV Q 12hours , as Blood cx : Coag Neg staph, Likely contaminant.  Awaiting repeat culture results.

## 2018-04-12 NOTE — PROGRESS NOTE ADULT - ASSESSMENT
64 y/o female with PMH CAD, pacemaker HTN, HLD, chronic low back pain, hx of ovarian cancer s/o complete hysterectomy, appendectomy, cholecystectomy, hx of bowel obstruction requiring SBR 1 yr ago who came to ER for worsening RUQ abdominal pain, 8/10, constant, assocated with n/v and temp of 102 orally last night.  In the ER, leukocytosis on labs, ct abdomen did not show bowl obstruction but has shown rt. middle and lower lobe infiltrate. CT chest: multifocal low right upper lobe and right lower lobe airspace consolidations concerning for infectious pneumonia. Pt started on IV abx. Consulted by surgery, no SBO indications/surgical indications. Side note: **was seen by behavioral health on last admission for psychosocial stressors, started on lexapro, as per pt does not take anymore.     > Anemia - pt reports h/o chronic anemia / thalassemia.  Monitor CBC.  No indication at present of acute blood loss.

## 2018-04-12 NOTE — PROGRESS NOTE ADULT - SUBJECTIVE AND OBJECTIVE BOX
Patient: DAMEON GOMEZ [id number] 63y Female                 Internal Medicine Hospitalist Progress Note - Dr. Florentin Ariza  Chief Complaint: Patient is a 63y old  Female who presents with a chief complaint of pneumonia (10 Apr 2018 01:18)    HPI:  62 y/o female with PMH CAD, pacemaker HTN, HLD, chronic low back pain, hx of ovarian cancer s/o complete hysterectomy, appendectomy, cholecystectomy, hx of bowel obstruction requiring SBR 1 yr ago who came to ER for worsening RUQ abdominal pain, 8/10, constant, assocated with n/v and temp of 102 orally last night.  In the ER, leukocytosis on labs, ct abdomen did not show bowl obstruction but has shown rt. middle and lower lobe infiltrate. CT chest: multifocal low right upper lobe and right lower lobe airspace consolidations concerning for infectious pneumonia. Pt started on IV abx. Consulted by surgery, no SBO indications/surgical indications. Side note: **was seen by behavioral health on last admission for psychosocial stressors, started on lexapro, as per pt does not take anymore.     Denies complaints.  No abdominal pain / fever / chills.  No cough.  No additional complaints.     ____________________PHYSICAL EXAM:  Vitals reviewed as indicated below  GENERAL:  NAD Alert and Oriented x 3   HEENT: NCAT  CARDIOVASCULAR:  S1, S2  LUNGS: coarse BS R base.  ABDOMEN:  soft, (-) tenderness, (-) distension, (+) bowel sounds, (-) guarding, (-) rebound (-) rigidity  EXTREMITIES:  no cyanosis / clubbing / edema.   ____________________      BACKGROUND:  HEALTH ISSUES - PROBLEM Dx:  Prophylactic measure: Prophylactic measure  Leukocytosis: Leukocytosis  Bacteremia due to Gram-positive bacteria: Bacteremia due to Gram-positive bacteria  CAD (coronary artery disease): CAD (coronary artery disease)  Low back pain with left-sided sciatica, unspecified back pain laterality, unspecified chronicity: Low back pain with left-sided sciatica, unspecified back pain laterality, unspecified chronicity  Diarrhea: Diarrhea  Back pain: Back pain  Nausea & vomiting: Nausea &amp; vomiting  Essential hypertension: Essential hypertension  Leukocytosis, unspecified type: Leukocytosis, unspecified type  Pneumonia of right lower lobe due to infectious organism: Pneumonia of right lower lobe due to infectious organism  Abdominal pain, unspecified abdominal location: Abdominal pain, unspecified abdominal location        Allergies    Dilaudid (Nausea)  peanuts (Hives)    Intolerances      PAST MEDICAL & SURGICAL HISTORY:  Varicose veins: left lower extremity  Pacemaker: reports shut off  Urinary incontinence, unspecified type  Low back pain with left-sided sciatica, unspecified back pain laterality, unspecified chronicity  Malignant neoplasm of ovary, unspecified laterality  Stented coronary artery  CAD (coronary artery disease)  Thalassemia, unspecified type  CVA (cerebral vascular accident)  HTN (hypertension)  S/P cholecystectomy  History of appendectomy  S/P spinal surgery: S/P spinal cord stimulator placed.  Artificial cardiac pacemaker: Reports &quot;shut off.&quot;  H/O abdominal hysterectomy  H/O laminectomy        VITALS:  Vital Signs Last 24 Hrs  T(C): 36.9 (12 Apr 2018 07:33), Max: 37.4 (11 Apr 2018 23:29)  T(F): 98.5 (12 Apr 2018 07:33), Max: 99.3 (11 Apr 2018 23:29)  HR: 73 (12 Apr 2018 08:33) (72 - 86)  BP: 185/72 (12 Apr 2018 08:33) (160/90 - 203/78)  BP(mean): --  RR: 18 (12 Apr 2018 07:33) (18 - 20)  SpO2: 95% (12 Apr 2018 07:33) (95% - 95%) Daily     Daily   CAPILLARY BLOOD GLUCOSE        I&O's Summary    11 Apr 2018 07:01  -  12 Apr 2018 07:00  --------------------------------------------------------  IN: 0 mL / OUT: 700 mL / NET: -700 mL          LABS:                        8.4    8.0   )-----------( 176      ( 11 Apr 2018 07:28 )             27.4     04-11    136  |  100  |  16.0  ----------------------------<  132<H>  3.6   |  26.0  |  0.84    Ca    8.4<L>      11 Apr 2018 07:28    TPro  5.8<L>  /  Alb  2.9<L>  /  TBili  0.4  /  DBili  x   /  AST  14  /  ALT  23  /  AlkPhos  90  04-11      LIVER FUNCTIONS - ( 11 Apr 2018 07:28 )  Alb: 2.9 g/dL / Pro: 5.8 g/dL / ALK PHOS: 90 U/L / ALT: 23 U/L / AST: 14 U/L / GGT: x                     MEDICATIONS:  MEDICATIONS  (STANDING):  ALBUTerol    90 MICROgram(s) HFA Inhaler 1 Puff(s) Inhalation every 4 hours  carvedilol 12.5 milliGRAM(s) Oral every 12 hours  docusate sodium 100 milliGRAM(s) Oral two times a day  enoxaparin Injectable 40 milliGRAM(s) SubCutaneous daily  lidocaine   Patch 1 Patch Transdermal daily  pantoprazole    Tablet 40 milliGRAM(s) Oral before breakfast  piperacillin/tazobactam IVPB. 3.375 Gram(s) IV Intermittent every 8 hours  saccharomyces boulardii 250 milliGRAM(s) Oral two times a day  tiotropium 18 MICROgram(s) Capsule 1 Capsule(s) Inhalation daily    MEDICATIONS  (PRN):  acetaminophen   Tablet 650 milliGRAM(s) Oral every 6 hours PRN For Temp greater than or equal to 38 C (100.4 F) / mild to moderate pain.  ALBUTerol/ipratropium for Nebulization 3 milliLiter(s) Nebulizer every 6 hours PRN Shortness of Breath and/or Wheezing  methocarbamol 500 milliGRAM(s) Oral three times a day PRN muscle spasms  morphine  - Injectable 2 milliGRAM(s) IV Push every 6 hours PRN Severe Pain (7 - 10)  ondansetron Injectable 4 milliGRAM(s) IV Push every 6 hours PRN Nausea and/or Vomiting  oxyCODONE    IR 10 milliGRAM(s) Oral every 4 hours PRN Moderate Pain (4 - 6)  oxyCODONE    IR 5 milliGRAM(s) Oral every 4 hours PRN Mild Pain (1 - 3)

## 2018-04-12 NOTE — PROGRESS NOTE ADULT - ASSESSMENT
Patient is 63 year old female, hx of chronic low back pain with exacerbation in left LE radicular symptoms. Reports poor pain control with current medication.

## 2018-04-12 NOTE — PROGRESS NOTE ADULT - SUBJECTIVE AND OBJECTIVE BOX
HPI/hospital course: 64 y/o female with PMH CAD, pacemaker HTN, HLD, chronic low back pain, hx of ovarian cancer s/o complete hysterectomy, appendectomy, cholecystectomy, gastric bypass, hx of bowel obstruction requiring SBR 1 yr ago who came to ER for worsening RUQ abdominal pain, 8/10, constant, assocated with n/v and temp of 102 orally last night.  In the ER, leukocytosis on labs, ct abdomen did not show bowl obstruction but has shown rt. middle and lower lobe infiltrate. CT chest: multifocal low right upper lobe and right lower lobe airspace consolidations concerning for infectious pneumonia. Pt started on IV abx. Consulted by surgery, no SBO indications/surgical indications. Initial  blood cultures resulted + with gram positive cocci, likely contaminant. Pt started on zosyn. Awaiting repeat blood cultures and then pt can be discharged home with home care. Overall pts symptoms have resolved and doing much better.     Pt seen and examined at bedside. Pt ano x 3, laying in bed. Pt denies medical complaints. Pt states abdominal pain has resolved/much better and no recent nausea/vomiting. Tolerating PO. Pt irritated pain mgt has not seen pt yet.. I recalled them in the room, states will see her. Pt still with diarrhea, though c.diff negative. Pt denies recent vomiting, fever, chills, headache, chest pain, SOB, cough, constipation.     MEDICATIONS  (STANDING):  ALBUTerol    90 MICROgram(s) HFA Inhaler 1 Puff(s) Inhalation every 4 hours  azithromycin  IVPB 500 milliGRAM(s) IV Intermittent every 24 hours  carvedilol 12.5 milliGRAM(s) Oral every 12 hours  cefTRIAXone Injectable. 1000 milliGRAM(s) IV Push every 24 hours  docusate sodium 100 milliGRAM(s) Oral two times a day  enoxaparin Injectable 40 milliGRAM(s) SubCutaneous daily  lactobacillus acidophilus 1 Tablet(s) Oral two times a day with meals  oxyCODONE    IR 10 milliGRAM(s) Oral four times a day  pantoprazole    Tablet 40 milliGRAM(s) Oral before breakfast  tiotropium 18 MICROgram(s) Capsule 1 Capsule(s) Inhalation daily    MEDICATIONS  (PRN):  acetaminophen   Tablet 650 milliGRAM(s) Oral every 6 hours PRN For Temp greater than or equal to 38 C (100.4 F) / mild to moderate pain.  ALBUTerol/ipratropium for Nebulization 3 milliLiter(s) Nebulizer every 6 hours PRN Shortness of Breath and/or Wheezing  morphine  - Injectable 2 milliGRAM(s) IV Push every 6 hours PRN Severe Pain (7 - 10)  ondansetron Injectable 4 milliGRAM(s) IV Push every 6 hours PRN Nausea and/or Vomiting    Allergies  Dilaudid (Nausea)  peanuts (Hives)    PMH:   CAD (coronary artery disease)    CVA (cerebral vascular accident)    HTN (hypertension)    Low back pain with left-sided sciatica, unspecified back pain laterality, unspecified chronicity    Malignant neoplasm of ovary, unspecified laterality    Pacemaker  Stented coronary artery    Thalassemia, unspecified type    Urinary incontinence, unspecified type    Varicose veins  left lower extremity.    REVIEW OF SYSTEMS:  CONSTITUTIONAL: No fever, weight loss, or fatigue  EYES: No eye pain, visual disturbances, or discharge  ENMT:  No difficulty hearing, tinnitus, vertigo; No sinus or throat pain  RESPIRATORY: No cough, wheezing, chills or hemoptysis; No shortness of breath  CARDIOVASCULAR: No chest pain, palpitations, dizziness, or leg swelling  GASTROINTESTINAL: see HPI  GENITOURINARY: No dysuria, frequency, hematuria, or incontinence  NEUROLOGICAL: No headaches, memory loss, loss of strength, numbness, or tremors  MUSCULOSKELETAL: No joint pain or swelling; No muscle, back, or extremity pain  PSYCHIATRIC: No depression, anxiety, mood swings, or difficulty sleeping.      Vital Signs Last 24 Hrs  T(C): 36.9 (2018 07:33), Max: 37.4 (2018 23:29)  T(F): 98.5 (2018 07:33), Max: 99.3 (2018 23:29)  HR: 73 (2018 08:33) (72 - 86)  BP: 185/72 (2018 08:33) (160/90 - 203/78)  BP(mean): --  RR: 18 (2018 07:33) (18 - 20)  SpO2: 95% (2018 07:33) (95% - 95%)    PHYSICAL EXAM:  GENERAL: NAD, well-groomed, well-developed  HEAD:  Atraumatic, Normocephalic  EYES: EOMI, PERRLA, conjunctiva and sclera clear  NERVOUS SYSTEM:  Alert & Oriented X3, Good concentration  CHEST/LUNG: Clear to auscultation bilaterally; No rales, rhonchi, wheezing, or rubs  HEART: Regular rate and rhythm; No murmurs, rubs, or gallops  ABDOMEN: Soft, Nondistended, nontender to palpation (much improvement from 2 days ago), +BS  EXTREMITIES:  2+ Peripheral Pulses, No clubbing, cyanosis, or edema      LABS:                        10.3   19.4  )-----------( 233      ( 2018 17:33 )             33.7     2018 17:33    142    |  103    |  21.0   ----------------------------<  95     3.8     |  26.0   |  0.75     Ca    8.7        2018 17:33    TPro  6.6    /  Alb  3.8    /  TBili  1.0    /  DBili  x      /  AST  55     /  ALT  42     /  AlkPhos  112    2018 17:33    PT/INR - ( 2018 17:33 )   PT: 12.6 sec;   INR: 1.14 ratio         Urinalysis Basic - ( 2018 19:46 )  Color: Yellow / Appearance: Clear / S.015 / pH: x  Gluc: x / Ketone: Negative  / Bili: Negative / Urobili: Negative mg/dL   Blood: x / Protein: 30 mg/dL / Nitrite: Negative   Leuk Esterase: Trace / RBC: 0-2 /HPF / WBC 0-2   Sq Epi: x / Non Sq Epi: Few / Bacteria: Few      RADIOLOGY & ADDITIONAL TESTS:   EXAM:  CT CHEST                        PROCEDURE DATE:  04/10/2018    IMPRESSION:   multifocal low right upper lobe and right lower lobe airspace   consolidations concerning for infectious pneumonia.    reactive right hilar and mediastinal adenopathy..     EXAM:  CT ABDOMEN AND PELVIS OC IC                        PROCEDURE DATE:  2018    IMPRESSION:  Extensive infiltrate at the right lung basein the right middle lobe and   right lower lobe.  No evidence of intestinal obstruction.  Diverticulosis..

## 2018-04-12 NOTE — PROGRESS NOTE ADULT - ASSESSMENT
64 y/o female with PMH CAD, pacemaker HTN, HLD, chronic low back pain, hx of ovarian cancer s/o complete hysterectomy, appendectomy, cholecystectomy, gastric bypass, hx of bowel obstruction requiring SBR 1 yr ago who came to ER for worsening RUQ abdominal pain, 8/10, constant, assocated with n/v and temp of 102 orally last night.  In the ER, leukocytosis on labs, ct abdomen did not show bowl obstruction but has shown rt. middle and lower lobe infiltrate. CT chest: multifocal low right upper lobe and right lower lobe airspace consolidations concerning for infectious pneumonia. Pt started on IV abx. Consulted by surgery, no SBO indications/surgical indications. Initial  blood cultures resulted + with gram positive cocci, likely contaminant. Pt started on zosyn. Pt evaluated by pain mgt, meds adjusted as per pain mgt. Awaiting repeat blood cultures and then pt can be discharged home with home care. Overall pts symptoms have resolved and doing much better.

## 2018-04-12 NOTE — PROGRESS NOTE ADULT - PROBLEM SELECTOR PLAN 5
As per pt, follows with Dr. Xiong. Was suppose to have appointment tomorrow. Will call him to have pt be seen here  pts pharmacy ([facility]), receives oxycodone 10mg PO 4x a day, zohydro ER (hydrocodone) 10mg BID, in addition to gabapentin 300mg PO QD and lyrica 75mg PO BID.  Will restart pts o/p meds, however we do not carry hydrocodone. Will continue with morphine PRN

## 2018-04-12 NOTE — PROGRESS NOTE ADULT - PROBLEM SELECTOR PLAN 7
Seen by pain mgt  Pain meds as per pain mgt:  Adjust oxycodone to 5/10mg [id number] PRN mild/moderate pain  -Continue morphine IVP q6 for severe pain  -Continue tylenol  -Discontinue lyrica/gabapentin as patient states she cannot tolerate due to side effects.  -Start lidoderm patches.  -Start robaxin 500mg bid prn spasms

## 2018-04-12 NOTE — PROGRESS NOTE ADULT - PROBLEM SELECTOR PLAN 4
- resolved  - was likely related to pts Right lower lobe PNA  - Also Pt with extensive abdominal surgery hx of appendectomy, cholecystectomy, complete hysterectomy abd SBR  - CT abdo/pelvis shows PNA and diverticulosis   - Surgery consulted, no evidence of SBO/surgical indications.. will follow   - Pt moving bowels, complains of diarrhea though c.diff negative

## 2018-04-13 ENCOUNTER — TRANSCRIPTION ENCOUNTER (OUTPATIENT)
Age: 63
End: 2018-04-13

## 2018-04-13 PROCEDURE — 71046 X-RAY EXAM CHEST 2 VIEWS: CPT | Mod: 26

## 2018-04-13 PROCEDURE — 99233 SBSQ HOSP IP/OBS HIGH 50: CPT

## 2018-04-13 RX ORDER — PIPERACILLIN AND TAZOBACTAM 4; .5 G/20ML; G/20ML
3.38 INJECTION, POWDER, LYOPHILIZED, FOR SOLUTION INTRAVENOUS EVERY 8 HOURS
Qty: 0 | Refills: 0 | Status: DISCONTINUED | OUTPATIENT
Start: 2018-04-13 | End: 2018-04-14

## 2018-04-13 RX ORDER — HYDRALAZINE HCL 50 MG
10 TABLET ORAL ONCE
Qty: 0 | Refills: 0 | Status: COMPLETED | OUTPATIENT
Start: 2018-04-13 | End: 2018-04-13

## 2018-04-13 RX ORDER — AMLODIPINE BESYLATE 2.5 MG/1
5 TABLET ORAL DAILY
Qty: 0 | Refills: 0 | Status: DISCONTINUED | OUTPATIENT
Start: 2018-04-13 | End: 2018-04-14

## 2018-04-13 RX ADMIN — CARVEDILOL PHOSPHATE 25 MILLIGRAM(S): 80 CAPSULE, EXTENDED RELEASE ORAL at 05:28

## 2018-04-13 RX ADMIN — MORPHINE SULFATE 2 MILLIGRAM(S): 50 CAPSULE, EXTENDED RELEASE ORAL at 17:42

## 2018-04-13 RX ADMIN — Medication 650 MILLIGRAM(S): at 06:48

## 2018-04-13 RX ADMIN — LIDOCAINE 1 PATCH: 4 CREAM TOPICAL at 04:14

## 2018-04-13 RX ADMIN — OXYCODONE HYDROCHLORIDE 10 MILLIGRAM(S): 5 TABLET ORAL at 00:07

## 2018-04-13 RX ADMIN — Medication 10 MILLIGRAM(S): at 21:37

## 2018-04-13 RX ADMIN — OXYCODONE HYDROCHLORIDE 10 MILLIGRAM(S): 5 TABLET ORAL at 22:37

## 2018-04-13 RX ADMIN — Medication 250 MILLIGRAM(S): at 17:41

## 2018-04-13 RX ADMIN — MORPHINE SULFATE 2 MILLIGRAM(S): 50 CAPSULE, EXTENDED RELEASE ORAL at 12:15

## 2018-04-13 RX ADMIN — PIPERACILLIN AND TAZOBACTAM 25 GRAM(S): 4; .5 INJECTION, POWDER, LYOPHILIZED, FOR SOLUTION INTRAVENOUS at 14:45

## 2018-04-13 RX ADMIN — ENOXAPARIN SODIUM 40 MILLIGRAM(S): 100 INJECTION SUBCUTANEOUS at 11:56

## 2018-04-13 RX ADMIN — OXYCODONE HYDROCHLORIDE 10 MILLIGRAM(S): 5 TABLET ORAL at 21:37

## 2018-04-13 RX ADMIN — Medication 3 MILLILITER(S): at 23:40

## 2018-04-13 RX ADMIN — PIPERACILLIN AND TAZOBACTAM 25 GRAM(S): 4; .5 INJECTION, POWDER, LYOPHILIZED, FOR SOLUTION INTRAVENOUS at 05:29

## 2018-04-13 RX ADMIN — MORPHINE SULFATE 2 MILLIGRAM(S): 50 CAPSULE, EXTENDED RELEASE ORAL at 18:35

## 2018-04-13 RX ADMIN — MORPHINE SULFATE 2 MILLIGRAM(S): 50 CAPSULE, EXTENDED RELEASE ORAL at 11:58

## 2018-04-13 RX ADMIN — OXYCODONE HYDROCHLORIDE 10 MILLIGRAM(S): 5 TABLET ORAL at 18:30

## 2018-04-13 RX ADMIN — OXYCODONE HYDROCHLORIDE 10 MILLIGRAM(S): 5 TABLET ORAL at 17:41

## 2018-04-13 RX ADMIN — AMLODIPINE BESYLATE 5 MILLIGRAM(S): 2.5 TABLET ORAL at 17:41

## 2018-04-13 RX ADMIN — OXYCODONE HYDROCHLORIDE 10 MILLIGRAM(S): 5 TABLET ORAL at 05:28

## 2018-04-13 RX ADMIN — LIDOCAINE 1 PATCH: 4 CREAM TOPICAL at 11:57

## 2018-04-13 RX ADMIN — CARVEDILOL PHOSPHATE 25 MILLIGRAM(S): 80 CAPSULE, EXTENDED RELEASE ORAL at 17:41

## 2018-04-13 RX ADMIN — OXYCODONE HYDROCHLORIDE 10 MILLIGRAM(S): 5 TABLET ORAL at 06:26

## 2018-04-13 RX ADMIN — OXYCODONE HYDROCHLORIDE 10 MILLIGRAM(S): 5 TABLET ORAL at 01:07

## 2018-04-13 NOTE — DISCHARGE NOTE ADULT - PLAN OF CARE
stable for discharge It is important to see your primary physician as well as the physicians noted below within the next week to perform a comprehensive medical review.  Call their offices for an appointment as soon as you leave the hospital.  If you do not have a primary physician, contact the [facility] [place] [phone] located on [address] [place] [address].  Your medical issues appear to be stable at this time, but if your symptoms recur or worsen, contact your physicians and/or return to the hospital if necessary.  If you encounter any issues or questions with your medication, call your physicians before stopping the medication.  Do not drive.  Limit your diet to 2 grams of sodium daily.

## 2018-04-13 NOTE — DISCHARGE NOTE ADULT - SECONDARY DIAGNOSIS.
Bacteremia due to Gram-positive bacteria Coronary artery disease involving native coronary artery of native heart without angina pectoris Essential hypertension Low back pain with left-sided sciatica, unspecified back pain laterality, unspecified chronicity Malignant neoplasm of ovary, unspecified laterality Thalassemia, unspecified type

## 2018-04-13 NOTE — PROGRESS NOTE ADULT - SUBJECTIVE AND OBJECTIVE BOX
Patient: DAMEON GOMEZ [id number] 63y Female                 Internal Medicine Hospitalist Progress Note - Dr. Florentin Ariza  Chief Complaint: Patient is a 63y old  Female who presents with a chief complaint of pneumonia (10 Apr 2018 01:18)    HPI:  62 y/o female with PMH CAD, pacemaker HTN, HLD, chronic low back pain, hx of ovarian cancer s/o complete hysterectomy, appendectomy, cholecystectomy, hx of bowel obstruction requiring SBR 1 yr ago who came to ER for worsening RUQ abdominal pain, 8/10, constant, assocated with n/v and temp of 102 orally last night.  In the ER, leukocytosis on labs, ct abdomen did not show bowl obstruction but has shown rt. middle and lower lobe infiltrate. CT chest: multifocal low right upper lobe and right lower lobe airspace consolidations concerning for infectious pneumonia. Pt started on IV abx. Consulted by surgery, no SBO indications/surgical indications. Side note: **was seen by behavioral health on last admission for psychosocial stressors, started on lexapro, as per pt does not take anymore.  3/9 Blood Cx positive for coag negative staph.  Repeat Cultures pending.    Pt c/o chills today.  no fevers.  No cough / sob.  No additional complaints.   ____________________PHYSICAL EXAM:  Vitals reviewed as indicated below  GENERAL:  NAD Alert and Oriented x 3   HEENT: NCAT  CARDIOVASCULAR:  S1, S2  LUNGS: coarse BS R base.  ABDOMEN:  soft, (-) tenderness, (-) distension, (+) bowel sounds, (-) guarding, (-) rebound (-) rigidity  EXTREMITIES:  no cyanosis / clubbing / edema.   ____________________    VITALS:  Vital Signs Last 24 Hrs  T(C): 36.8 (13 Apr 2018 08:53), Max: 36.8 (13 Apr 2018 00:44)  T(F): 98.2 (13 Apr 2018 08:53), Max: 98.3 (13 Apr 2018 00:44)  HR: 63 (13 Apr 2018 08:53) (63 - 86)  BP: 165/67 (13 Apr 2018 08:53) (154/76 - 189/74)  BP(mean): --  RR: 18 (13 Apr 2018 08:53) (15 - 18)  SpO2: 97% (13 Apr 2018 08:53) (95% - 97%) Daily     Daily   CAPILLARY BLOOD GLUCOSE        I&O's Summary      LABS:                        8.4    6.3   )-----------( 200      ( 12 Apr 2018 12:43 )             27.5                       MEDICATIONS:  acetaminophen   Tablet 650 milliGRAM(s) Oral every 6 hours PRN  ALBUTerol    90 MICROgram(s) HFA Inhaler 1 Puff(s) Inhalation every 4 hours  ALBUTerol/ipratropium for Nebulization 3 milliLiter(s) Nebulizer every 6 hours PRN  carvedilol 25 milliGRAM(s) Oral every 12 hours  docusate sodium 100 milliGRAM(s) Oral two times a day  enoxaparin Injectable 40 milliGRAM(s) SubCutaneous daily  lidocaine   Patch 1 Patch Transdermal daily  methocarbamol 500 milliGRAM(s) Oral three times a day PRN  morphine  - Injectable 2 milliGRAM(s) IV Push every 6 hours PRN  ondansetron Injectable 4 milliGRAM(s) IV Push every 6 hours PRN  oxyCODONE    IR 10 milliGRAM(s) Oral every 4 hours PRN  oxyCODONE    IR 5 milliGRAM(s) Oral every 4 hours PRN  pantoprazole    Tablet 40 milliGRAM(s) Oral before breakfast  piperacillin/tazobactam IVPB. 3.375 Gram(s) IV Intermittent every 8 hours  saccharomyces boulardii 250 milliGRAM(s) Oral two times a day  tiotropium 18 MICROgram(s) Capsule 1 Capsule(s) Inhalation daily

## 2018-04-13 NOTE — PROGRESS NOTE ADULT - ASSESSMENT
62 y/o female with PMH CAD, pacemaker HTN, HLD, chronic low back pain, hx of ovarian cancer s/o complete hysterectomy, appendectomy, cholecystectomy, hx of bowel obstruction requiring SBR 1 yr ago who came to ER for worsening RUQ abdominal pain, 8/10, constant, assocated with n/v and temp of 102 orally last night.  In the ER, leukocytosis on labs, ct abdomen did not show bowl obstruction but has shown rt. middle and lower lobe infiltrate. CT chest: multifocal low right upper lobe and right lower lobe airspace consolidations concerning for infectious pneumonia. Pt started on IV abx. Consulted by surgery, no SBO indications/surgical indications. Side note: **was seen by behavioral health on last admission for psychosocial stressors, started on lexapro, as per pt does not take anymore.     > Anemia - pt reports h/o chronic anemia / thalassemia.  H/H stable.  No indication at present of acute blood loss.

## 2018-04-13 NOTE — PROGRESS NOTE ADULT - SUBJECTIVE AND OBJECTIVE BOX
Chief Complaint:    HPI:  64 y/o female came to ER, sent in by her surgeon as she started  abdominal pain which was more in the rt. upper to lower quadrant, 8 out of 10 and constant, associated with n/v. pt. reports fever of 102 last night . no diarrhea. had a loose BM this am. pt. has noticed lack of flatus. As per pt. her sympt. were similar to her  prior bowl obstruction for which she got surgery about 1 year ago. pt. reports no cough. no phlegm no sick contact. no swallowing problem. no flu like sympt. Pt. has chronic low back pain and is not very mobile person. In the ER pt's ct abdomen did not show bowl obstruction but has shown rt. middle and lower lobe infiltrate. no cp. no sob. pt. was given food in the ER which she tolerated and reports no n/v. pt. reports that she does not take escitalopram any more. (2018 23:11)      PAST MEDICAL & SURGICAL HISTORY:  Varicose veins: left lower extremity  Pacemaker: reports shut off  Urinary incontinence, unspecified type  Low back pain with left-sided sciatica, unspecified back pain laterality, unspecified chronicity  Malignant neoplasm of ovary, unspecified laterality  Stented coronary artery  CAD (coronary artery disease)  Thalassemia, unspecified type  CVA (cerebral vascular accident)  HTN (hypertension)  S/P cholecystectomy  History of appendectomy  S/P spinal surgery: S/P spinal cord stimulator placed.  Artificial cardiac pacemaker: Reports &quot;shut off.&quot;  H/O abdominal hysterectomy  H/O laminectomy      FAMILY HISTORY:  Family history of uterine cancer (Sibling)  Family history of cerebrovascular accident (CVA)  Family history of hypertension  Family history of diabetes mellitus (DM)      SOCIAL HISTORY:  Denies Smoking, Alcohol, or Drug Use    Allergies    Dilaudid (Nausea)  peanuts (Hives)    Intolerances        PAIN MEDICATIONS:  acetaminophen   Tablet 650 milliGRAM(s) Oral every 6 hours PRN  methocarbamol 500 milliGRAM(s) Oral three times a day PRN  morphine  - Injectable 2 milliGRAM(s) IV Push every 6 hours PRN  ondansetron Injectable 4 milliGRAM(s) IV Push every 6 hours PRN  oxyCODONE    IR 10 milliGRAM(s) Oral every 4 hours PRN  oxyCODONE    IR 5 milliGRAM(s) Oral every 4 hours PRN    Heme:  enoxaparin Injectable 40 milliGRAM(s) SubCutaneous daily    Antibiotics:  piperacillin/tazobactam IVPB. 3.375 Gram(s) IV Intermittent every 8 hours    Cardiovascular:  carvedilol 25 milliGRAM(s) Oral every 12 hours    GI:  docusate sodium 100 milliGRAM(s) Oral two times a day  pantoprazole    Tablet 40 milliGRAM(s) Oral before breakfast    Endocrine:    All Other Medications:  lidocaine   Patch 1 Patch Transdermal daily  saccharomyces boulardii 250 milliGRAM(s) Oral two times a day      LABS:                          8.4    6.3   )-----------( 200      ( 2018 12:43 )             27.5         Vital Signs Last 24 Hrs  T(C): 36.8 (2018 08:53), Max: 36.8 (2018 00:44)  T(F): 98.2 (2018 08:53), Max: 98.3 (2018 00:44)  HR: 63 (2018 08:53) (63 - 86)  BP: 165/67 (2018 08:53) (154/76 - 189/74)  BP(mean): --  RR: 18 (2018 08:53) (15 - 18)  SpO2: 97% (2018 08:53) (95% - 97%)    PAIN SCORE:   5      SCALE USED: (1-10)    OTHER SYMPTOMS (0 = None;  1 = Mild; 2 = Moderate; 3 = Severe)  Anorexia: 0          Dyspnea:0         Pruritus:0         Nausea:0             Agitation:0        Anxiety:0    Vomitin          Drowsiness:0    Depression:0  Constipation:0    Diarrhea:0        Other:0

## 2018-04-13 NOTE — PROGRESS NOTE ADULT - ASSESSMENT
patient seen and examined at bedside sleeping comfortably before arousal   -She is my patient in pain management office  -She will remain on current pain medication   -we will continue to follow

## 2018-04-13 NOTE — DISCHARGE NOTE ADULT - CARE PLAN
Principal Discharge DX:	Pneumonia of right lower lobe due to infectious organism  Goal:	stable for discharge  Assessment and plan of treatment:	It is important to see your primary physician as well as the physicians noted below within the next week to perform a comprehensive medical review.  Call their offices for an appointment as soon as you leave the hospital.  If you do not have a primary physician, contact the [facility] [phone] located on [address] [place] [address], [place] [address].  Your medical issues appear to be stable at this time, but if your symptoms recur or worsen, contact your physicians and/or return to the hospital if necessary.  If you encounter any issues or questions with your medication, call your physicians before stopping the medication.  Do not drive.  Limit your diet to 2 grams of sodium daily.  Secondary Diagnosis:	Bacteremia due to Gram-positive bacteria  Secondary Diagnosis:	Coronary artery disease involving native coronary artery of native heart without angina pectoris  Secondary Diagnosis:	Essential hypertension  Secondary Diagnosis:	Low back pain with left-sided sciatica, unspecified back pain laterality, unspecified chronicity  Secondary Diagnosis:	Malignant neoplasm of ovary, unspecified laterality  Secondary Diagnosis:	Thalassemia, unspecified type

## 2018-04-13 NOTE — DISCHARGE NOTE ADULT - CARE PROVIDER_API CALL
Sofia,   Phone: (   )    -  Fax: (   )    -    Ubaldo Xiong (MD), Anesthesiology; Pain Medicine  [address] [place] [address]  Phone: [phone]  Fax: [phone]

## 2018-04-13 NOTE — DISCHARGE NOTE ADULT - HOSPITAL COURSE
> Anemia - pt reports h/o chronic anemia / thalassemia.  H/H stable.  No indication at present of acute blood loss.    > HTN - BP better controlled.  Continue Hydralazine, Procardia. 	    Problem/Plan - 1:  ·  Problem: Pneumonia of right lower lobe due to infectious organism.  Plan: Suspected Gram Neg  Continue Zosyn.  Coag negative staph felt to be contaminant.  Awaiting repeat cultures.  Discussed with ID.  Repeat CXR PA -Lat showed no worsening.  D/w patient need for repeat imaging 2-4 weeks post abx course to ensure resolution.     Problem/Plan - 2:  ·  Problem: Bacteremia due to Gram-positive bacteria.  Plan: Coag Neg staph, Likely contaminant.  Awaiting repeat culture results.     Problem/Plan - 3:  ·  Problem: Leukocytosis.  Plan: due to pna and bacteremia, resolved today  - repeat labs in am  - plan as above.     Problem/Plan - 4:  ·  Problem: Essential hypertension.  Plan: Will continue with coreg 12.5 BID  Verified her medications with pharmacy, pt has not filled her coreg prescription with them since september 2017. Pharmacy reports she last filled coreg 25mg BID september 2017..  Will continue with coreg 12.5mg BID for now, monitor BP. Currently normotensive but if elevated will start coreg 25mg BID.     Problem/Plan - 5:  ·  Problem: Low back pain with left-sided sciatica, unspecified back pain laterality, unspecified chronicity.  Plan: Continue pain control per pain management.     Problem/Plan - 6:  Problem: CAD (coronary artery disease). Plan: c/w ASA, BB. Patient: DAMEON GOMEZ [id number]                 Internal Medicine Hospitalist Discharge Note - Dr. Florentin Ariza    Chief Complaint: Patient is a 63y old  Female who presents with a chief complaint of pneumonia (10 Apr 2018 01:18)    HPI:  64 y/o female with PMH CAD, pacemaker HTN, HLD, chronic low back pain, hx of ovarian cancer s/o complete hysterectomy, appendectomy, cholecystectomy, hx of bowel obstruction requiring SBR 1 yr ago who came to ER for worsening RUQ abdominal pain, 8/10, constant, assocated with n/v and temp of 102 orally last night.  In the ER, leukocytosis on labs, ct abdomen did not show bowl obstruction but has shown rt. middle and lower lobe infiltrate. CT chest: multifocal low right upper lobe and right lower lobe airspace consolidations concerning for infectious pneumonia. Pt started on IV abx. Consulted by surgery, no SBO indications/surgical indications. Side note: **was seen by behavioral health on last admission for psychosocial stressors, started on lexapro, as per pt does not take anymore.  3/9 Blood Cx positive for coag negative staph.  Repeat Culture negative.    No SOB.  No fever / chills.  SaO2 98-99% on RA.  Ambulates freely.  No additional complaints.  BP better controlled.     64 y/o female with PMH CAD, pacemaker HTN, HLD, chronic low back pain, hx of ovarian cancer s/o complete hysterectomy, appendectomy, cholecystectomy, hx of bowel obstruction requiring SBR 1 yr ago who came to ER for worsening RUQ abdominal pain, 8/10, constant, assocated with n/v and temp of 102 orally last night.  In the ER, leukocytosis on labs, ct abdomen did not show bowl obstruction but has shown rt. middle and lower lobe infiltrate. CT chest: multifocal low right upper lobe and right lower lobe airspace consolidations concerning for infectious pneumonia. Pt started on IV abx. Consulted by surgery, no SBO indications/surgical indications. Side note: **was seen by behavioral health on last admission for psychosocial stressors, started on lexapro, as per pt does not take anymore.     > Anemia - pt reports h/o chronic anemia / thalassemia.  H/H was stable.  No indication at present of acute blood loss.    > HTN - BP better controlled.  Continue Hydralazine, Procardia. 	    Problem/Plan - 1:  ·  Problem: Pneumonia of right lower lobe due to infectious organism.  Plan: Suspected Gram Neg  Continue Zosyn.  Coag negative staph felt to be contaminant.  Awaiting repeat cultures.  Discussed with ID.  Repeat CXR PA -Lat showed no worsening.  D/w patient need for repeat imaging 2-4 weeks post abx course to ensure resolution.     Problem/Plan - 2:  ·  Problem: Bacteremia due to Gram-positive bacteria.  Plan: Coag Neg staph, Likely contaminant.  Repeat culture negative.     Problem/Plan - 3:  ·  Problem: Leukocytosis.  Plan: due to pna and bacteremia, resolved.     Problem/Plan - 4:  ·  Problem: Essential hypertension.  Plan: Continue Coreg, Procardia.  Discussed monitoring BP, titration of meds with PMD.     Problem/Plan - 5:  ·  Problem: Low back pain with left-sided sciatica, unspecified back pain laterality, unspecified chronicity.  Plan: Continue pain control per pain management.     Problem/Plan - 6:  Problem: CAD (coronary artery disease). Plan: c/w ASA, BB.      Disposition: stable for discharge.  Outpatient followup as above.  Patient was advised to return if they experience any recurrence or worsening of symptoms.  Total time spent on discharge was 35 minutes.  -Florentin Ariza D.O., Hospitalist, [facility]

## 2018-04-13 NOTE — DISCHARGE NOTE ADULT - MEDICATION SUMMARY - MEDICATIONS TO TAKE
I will START or STAY ON the medications listed below when I get home from the hospital:    acetaminophen 325 mg oral tablet  -- 2 tab(s) by mouth every 6 hours, As needed, Mild Pain (1 - 3)  -- Indication: For Pain    Percocet 10/325 oral tablet  -- 1 tab(s) by mouth every 6 hours, As Needed pain  -- Indication: For Pain    escitalopram 5 mg oral tablet  -- 3 tab(s) by mouth once a day  -- Indication: For Depression    ondansetron 2 mg/mL injectable solution  -- 4 milligram(s) injectable every 6 hours  -- Indication: For Nausea & vomiting    carvedilol 12.5 mg oral tablet  -- 1 tab(s) by mouth every 12 hours  -- Indication: For Hypertension    albuterol-ipratropium 2.5 mg-0.5 mg/3 mL inhalation solution  -- 3 milliliter(s) inhaled every 6 hours as needed SOB  -- Indication: For SOB    NIFEdipine 90 mg oral tablet, extended release  -- 1 tab(s) by mouth once a day  -- Indication: For Hypertension    lidocaine 5% topical film  --  on skin   -- Indication: For Pain    bisacodyl 10 mg rectal suppository  -- 1 suppository(ies) rectally once a day  -- Indication: For Laxative    docusate sodium 100 mg oral capsule  -- 1 cap(s) by mouth 3 times a day  -- Indication: For Laxative    senna oral tablet  -- 2 tab(s) by mouth once a day (at bedtime)  -- Indication: For Laxative    amoxicillin-clavulanate 875 mg-125 mg oral tablet  -- 1 tab(s) by mouth 2 times a day  -- Indication: For Pneumonia of right lower lobe due to infectious organism    saccharomyces boulardii lyo 250 mg oral capsule  -- 1 cap(s) by mouth 2 times a day  -- Indication: For Supplement    pantoprazole 40 mg oral delayed release tablet  -- 1 tab(s) by mouth once a day (before a meal)  -- Indication: For Gastritis

## 2018-04-13 NOTE — DISCHARGE NOTE ADULT - PATIENT PORTAL LINK FT
You can access the [employer] Patient Portal, offered by [facility], by registering with the following website: [email or web address]

## 2018-04-13 NOTE — DISCHARGE NOTE ADULT - COMMUNITY RESOURCES
ALVARADO Lung Appt on 4/17/18 at 2p at [address] [facility]

## 2018-04-13 NOTE — DISCHARGE NOTE ADULT - MEDICATION SUMMARY - MEDICATIONS TO CHANGE
I will SWITCH the dose or number of times a day I take the medications listed below when I get home from the hospital:    NIFEdipine 90 mg oral tablet, extended release  -- 1 tab(s) by mouth once a day

## 2018-04-14 LAB
CULTURE RESULTS: SIGNIFICANT CHANGE UP
SPECIMEN SOURCE: SIGNIFICANT CHANGE UP

## 2018-04-14 PROCEDURE — 99233 SBSQ HOSP IP/OBS HIGH 50: CPT

## 2018-04-14 RX ORDER — HYDRALAZINE HCL 50 MG
50 TABLET ORAL EVERY 8 HOURS
Qty: 0 | Refills: 0 | Status: DISCONTINUED | OUTPATIENT
Start: 2018-04-14 | End: 2018-04-15

## 2018-04-14 RX ORDER — HYDRALAZINE HCL 50 MG
10 TABLET ORAL EVERY 6 HOURS
Qty: 0 | Refills: 0 | Status: DISCONTINUED | OUTPATIENT
Start: 2018-04-14 | End: 2018-04-14

## 2018-04-14 RX ORDER — HYDRALAZINE HCL 50 MG
25 TABLET ORAL EVERY 8 HOURS
Qty: 0 | Refills: 0 | Status: DISCONTINUED | OUTPATIENT
Start: 2018-04-14 | End: 2018-04-14

## 2018-04-14 RX ORDER — AMLODIPINE BESYLATE 2.5 MG/1
10 TABLET ORAL DAILY
Qty: 0 | Refills: 0 | Status: DISCONTINUED | OUTPATIENT
Start: 2018-04-14 | End: 2018-04-14

## 2018-04-14 RX ORDER — NIFEDIPINE 30 MG
90 TABLET, EXTENDED RELEASE 24 HR ORAL DAILY
Qty: 0 | Refills: 0 | Status: DISCONTINUED | OUTPATIENT
Start: 2018-04-14 | End: 2018-04-16

## 2018-04-14 RX ORDER — LABETALOL HCL 100 MG
10 TABLET ORAL ONCE
Qty: 0 | Refills: 0 | Status: COMPLETED | OUTPATIENT
Start: 2018-04-14 | End: 2018-04-14

## 2018-04-14 RX ADMIN — OXYCODONE HYDROCHLORIDE 10 MILLIGRAM(S): 5 TABLET ORAL at 21:28

## 2018-04-14 RX ADMIN — OXYCODONE HYDROCHLORIDE 10 MILLIGRAM(S): 5 TABLET ORAL at 16:50

## 2018-04-14 RX ADMIN — Medication 3 MILLILITER(S): at 09:37

## 2018-04-14 RX ADMIN — ENOXAPARIN SODIUM 40 MILLIGRAM(S): 100 INJECTION SUBCUTANEOUS at 12:12

## 2018-04-14 RX ADMIN — OXYCODONE HYDROCHLORIDE 10 MILLIGRAM(S): 5 TABLET ORAL at 17:50

## 2018-04-14 RX ADMIN — OXYCODONE HYDROCHLORIDE 10 MILLIGRAM(S): 5 TABLET ORAL at 22:28

## 2018-04-14 RX ADMIN — LIDOCAINE 1 PATCH: 4 CREAM TOPICAL at 12:12

## 2018-04-14 RX ADMIN — Medication 1 TABLET(S): at 18:47

## 2018-04-14 RX ADMIN — CARVEDILOL PHOSPHATE 25 MILLIGRAM(S): 80 CAPSULE, EXTENDED RELEASE ORAL at 04:29

## 2018-04-14 RX ADMIN — LIDOCAINE 1 PATCH: 4 CREAM TOPICAL at 04:21

## 2018-04-14 RX ADMIN — Medication 10 MILLIGRAM(S): at 01:21

## 2018-04-14 RX ADMIN — PANTOPRAZOLE SODIUM 40 MILLIGRAM(S): 20 TABLET, DELAYED RELEASE ORAL at 04:30

## 2018-04-14 RX ADMIN — OXYCODONE HYDROCHLORIDE 10 MILLIGRAM(S): 5 TABLET ORAL at 04:19

## 2018-04-14 RX ADMIN — OXYCODONE HYDROCHLORIDE 10 MILLIGRAM(S): 5 TABLET ORAL at 05:17

## 2018-04-14 RX ADMIN — Medication 25 MILLIGRAM(S): at 13:59

## 2018-04-14 RX ADMIN — AMLODIPINE BESYLATE 5 MILLIGRAM(S): 2.5 TABLET ORAL at 04:30

## 2018-04-14 RX ADMIN — CARVEDILOL PHOSPHATE 25 MILLIGRAM(S): 80 CAPSULE, EXTENDED RELEASE ORAL at 18:47

## 2018-04-14 RX ADMIN — Medication 50 MILLIGRAM(S): at 21:28

## 2018-04-14 NOTE — PROGRESS NOTE ADULT - ASSESSMENT
64 y/o female with PMH CAD, pacemaker HTN, HLD, chronic low back pain, hx of ovarian cancer s/o complete hysterectomy, appendectomy, cholecystectomy, hx of bowel obstruction requiring SBR 1 yr ago who came to ER for worsening RUQ abdominal pain, 8/10, constant, assocated with n/v and temp of 102 orally last night.  In the ER, leukocytosis on labs, ct abdomen did not show bowl obstruction but has shown rt. middle and lower lobe infiltrate. CT chest: multifocal low right upper lobe and right lower lobe airspace consolidations concerning for infectious pneumonia. Pt started on IV abx. Consulted by surgery, no SBO indications/surgical indications. Side note: **was seen by behavioral health on last admission for psychosocial stressors, started on lexapro, as per pt does not take anymore.     > Anemia - pt reports h/o chronic anemia / thalassemia.  H/H stable.  No indication at present of acute blood loss.    > HTN - BP elevated.  Due to pt concerns, stopped Norvasc and added Hydralazine.

## 2018-04-14 NOTE — PROGRESS NOTE ADULT - SUBJECTIVE AND OBJECTIVE BOX
Patient: DAMEON GOMEZ [id number] 63y Female                 Internal Medicine Hospitalist Progress Note - Dr. Florentin Ariza  Chief Complaint: Patient is a 63y old  Female who presents with a chief complaint of pneumonia (10 Apr 2018 01:18)    HPI:  62 y/o female with PMH CAD, pacemaker HTN, HLD, chronic low back pain, hx of ovarian cancer s/o complete hysterectomy, appendectomy, cholecystectomy, hx of bowel obstruction requiring SBR 1 yr ago who came to ER for worsening RUQ abdominal pain, 8/10, constant, assocated with n/v and temp of 102 orally last night.  In the ER, leukocytosis on labs, ct abdomen did not show bowl obstruction but has shown rt. middle and lower lobe infiltrate. CT chest: multifocal low right upper lobe and right lower lobe airspace consolidations concerning for infectious pneumonia. Pt started on IV abx. Consulted by surgery, no SBO indications/surgical indications. Side note: **was seen by behavioral health on last admission for psychosocial stressors, started on lexapro, as per pt does not take anymore.  3/9 Blood Cx positive for coag negative staph.  Repeat Cultures pending.    Pt c/o SOB overnight, now resolved.  She feels that episode was associated with Norvasc.  No fever / chills.  SaO2 98-99% on RA.  Ambulates freely.  No additional complaints.     ____________________PHYSICAL EXAM:  Vitals reviewed as indicated below  GENERAL:  NAD Alert and Oriented x 3   HEENT: NCAT  CARDIOVASCULAR:  S1, S2  LUNGS: coarse BS R base.  ABDOMEN:  soft, (-) tenderness, (-) distension, (+) bowel sounds, (-) guarding, (-) rebound (-) rigidity  EXTREMITIES:  no cyanosis / clubbing / edema.   ____________________    VITALS:  Vital Signs Last 24 Hrs  T(C): 36.8 (14 Apr 2018 07:59), Max: 37 (14 Apr 2018 01:12)  T(F): 98.3 (14 Apr 2018 07:59), Max: 98.6 (14 Apr 2018 01:12)  HR: 71 (14 Apr 2018 07:59) (71 - 82)  BP: 177/78 (14 Apr 2018 07:59) (170/76 - 213/80)  BP(mean): --  RR: 18 (14 Apr 2018 07:59) (17 - 18)  SpO2: 98% (14 Apr 2018 07:59) (98% - 99%) Daily     Daily   CAPILLARY BLOOD GLUCOSE        I&O's Summary      LABS:                        8.4    6.3   )-----------( 200      ( 12 Apr 2018 12:43 )             27.5                       MEDICATIONS:  acetaminophen   Tablet 650 milliGRAM(s) Oral every 6 hours PRN  ALBUTerol    90 MICROgram(s) HFA Inhaler 1 Puff(s) Inhalation every 4 hours  ALBUTerol/ipratropium for Nebulization 3 milliLiter(s) Nebulizer every 6 hours PRN  carvedilol 25 milliGRAM(s) Oral every 12 hours  docusate sodium 100 milliGRAM(s) Oral two times a day  enoxaparin Injectable 40 milliGRAM(s) SubCutaneous daily  hydrALAZINE 25 milliGRAM(s) Oral every 8 hours  hydrALAZINE Injectable 10 milliGRAM(s) IV Push every 6 hours PRN  lidocaine   Patch 1 Patch Transdermal daily  methocarbamol 500 milliGRAM(s) Oral three times a day PRN  morphine  - Injectable 2 milliGRAM(s) IV Push every 6 hours PRN  ondansetron Injectable 4 milliGRAM(s) IV Push every 6 hours PRN  oxyCODONE    IR 10 milliGRAM(s) Oral every 4 hours PRN  oxyCODONE    IR 5 milliGRAM(s) Oral every 4 hours PRN  pantoprazole    Tablet 40 milliGRAM(s) Oral before breakfast  piperacillin/tazobactam IVPB. 3.375 Gram(s) IV Intermittent every 8 hours  saccharomyces boulardii 250 milliGRAM(s) Oral two times a day  tiotropium 18 MICROgram(s) Capsule 1 Capsule(s) Inhalation daily

## 2018-04-15 PROCEDURE — 99232 SBSQ HOSP IP/OBS MODERATE 35: CPT

## 2018-04-15 RX ORDER — HYDRALAZINE HCL 50 MG
25 TABLET ORAL EVERY 8 HOURS
Qty: 0 | Refills: 0 | Status: DISCONTINUED | OUTPATIENT
Start: 2018-04-15 | End: 2018-04-16

## 2018-04-15 RX ADMIN — OXYCODONE HYDROCHLORIDE 10 MILLIGRAM(S): 5 TABLET ORAL at 10:41

## 2018-04-15 RX ADMIN — Medication 25 MILLIGRAM(S): at 22:09

## 2018-04-15 RX ADMIN — OXYCODONE HYDROCHLORIDE 10 MILLIGRAM(S): 5 TABLET ORAL at 05:31

## 2018-04-15 RX ADMIN — ENOXAPARIN SODIUM 40 MILLIGRAM(S): 100 INJECTION SUBCUTANEOUS at 12:43

## 2018-04-15 RX ADMIN — OXYCODONE HYDROCHLORIDE 10 MILLIGRAM(S): 5 TABLET ORAL at 16:14

## 2018-04-15 RX ADMIN — OXYCODONE HYDROCHLORIDE 10 MILLIGRAM(S): 5 TABLET ORAL at 11:41

## 2018-04-15 RX ADMIN — Medication 1 TABLET(S): at 05:26

## 2018-04-15 RX ADMIN — Medication 50 MILLIGRAM(S): at 05:26

## 2018-04-15 RX ADMIN — LIDOCAINE 1 PATCH: 4 CREAM TOPICAL at 12:43

## 2018-04-15 RX ADMIN — OXYCODONE HYDROCHLORIDE 10 MILLIGRAM(S): 5 TABLET ORAL at 17:14

## 2018-04-15 RX ADMIN — Medication 90 MILLIGRAM(S): at 10:38

## 2018-04-15 RX ADMIN — CARVEDILOL PHOSPHATE 25 MILLIGRAM(S): 80 CAPSULE, EXTENDED RELEASE ORAL at 17:35

## 2018-04-15 RX ADMIN — Medication 1 TABLET(S): at 17:35

## 2018-04-15 RX ADMIN — Medication 25 MILLIGRAM(S): at 14:12

## 2018-04-15 RX ADMIN — OXYCODONE HYDROCHLORIDE 10 MILLIGRAM(S): 5 TABLET ORAL at 22:40

## 2018-04-15 RX ADMIN — LIDOCAINE 1 PATCH: 4 CREAM TOPICAL at 00:00

## 2018-04-15 RX ADMIN — Medication 250 MILLIGRAM(S): at 17:35

## 2018-04-15 RX ADMIN — OXYCODONE HYDROCHLORIDE 10 MILLIGRAM(S): 5 TABLET ORAL at 22:10

## 2018-04-15 RX ADMIN — LIDOCAINE 1 PATCH: 4 CREAM TOPICAL at 23:31

## 2018-04-15 RX ADMIN — CARVEDILOL PHOSPHATE 25 MILLIGRAM(S): 80 CAPSULE, EXTENDED RELEASE ORAL at 05:25

## 2018-04-15 NOTE — DIETITIAN INITIAL EVALUATION ADULT. - OTHER INFO
Pt appears well nourished and eating 100% per flow sheets. Pt refused to participate in 2 attempts at an interview.

## 2018-04-15 NOTE — DIETITIAN INITIAL EVALUATION ADULT. - PROBLEM SELECTOR PLAN 3
likely related to pneumonia. follow repeat cbc. continue iv antibiotics. mild anemia likely chronic , h/h stable compared to old lbas. follow repeat labs. mild elevation of ast/ alt, follow am labs.

## 2018-04-15 NOTE — PROGRESS NOTE ADULT - ASSESSMENT
62 y/o female with PMH CAD, pacemaker HTN, HLD, chronic low back pain, hx of ovarian cancer s/o complete hysterectomy, appendectomy, cholecystectomy, hx of bowel obstruction requiring SBR 1 yr ago who came to ER for worsening RUQ abdominal pain, 8/10, constant, assocated with n/v and temp of 102 orally last night.  In the ER, leukocytosis on labs, ct abdomen did not show bowl obstruction but has shown rt. middle and lower lobe infiltrate. CT chest: multifocal low right upper lobe and right lower lobe airspace consolidations concerning for infectious pneumonia. Pt started on IV abx. Consulted by surgery, no SBO indications/surgical indications. Side note: **was seen by behavioral health on last admission for psychosocial stressors, started on lexapro, as per pt does not take anymore.     > Anemia - pt reports h/o chronic anemia / thalassemia.  H/H stable.  No indication at present of acute blood loss.    > HTN - BP better controlled.  Continue Hydralazine, Procardia.

## 2018-04-15 NOTE — PROGRESS NOTE ADULT - SUBJECTIVE AND OBJECTIVE BOX
Patient: DAMEON GOMEZ [id number] 63y Female                 Internal Medicine Hospitalist Progress Note - Dr. Florentin Ariza  Chief Complaint: Patient is a 63y old  Female who presents with a chief complaint of pneumonia (10 Apr 2018 01:18)    HPI:  62 y/o female with PMH CAD, pacemaker HTN, HLD, chronic low back pain, hx of ovarian cancer s/o complete hysterectomy, appendectomy, cholecystectomy, hx of bowel obstruction requiring SBR 1 yr ago who came to ER for worsening RUQ abdominal pain, 8/10, constant, assocated with n/v and temp of 102 orally last night.  In the ER, leukocytosis on labs, ct abdomen did not show bowl obstruction but has shown rt. middle and lower lobe infiltrate. CT chest: multifocal low right upper lobe and right lower lobe airspace consolidations concerning for infectious pneumonia. Pt started on IV abx. Consulted by surgery, no SBO indications/surgical indications. Side note: **was seen by behavioral health on last admission for psychosocial stressors, started on lexapro, as per pt does not take anymore.  3/9 Blood Cx positive for coag negative staph.  Repeat Cultures pending.    No SOB.  No fever / chills.  SaO2 98-99% on RA.  Ambulates freely.  No additional complaints.  BP better controlled.     ____________________PHYSICAL EXAM:  Vitals reviewed as indicated below  GENERAL:  NAD Alert and Oriented x 3   HEENT: NCAT  CARDIOVASCULAR:  S1, S2  LUNGS: coarse BS R base.  ABDOMEN:  soft, (-) tenderness, (-) distension, (+) bowel sounds, (-) guarding, (-) rebound (-) rigidity  EXTREMITIES:  no cyanosis / clubbing / edema.   ____________________    VITALS:  Vital Signs Last 24 Hrs  T(C): 37 (15 Apr 2018 08:13), Max: 37.1 (14 Apr 2018 23:55)  T(F): 98.6 (15 Apr 2018 08:13), Max: 98.8 (14 Apr 2018 23:55)  HR: 71 (15 Apr 2018 08:13) (71 - 80)  BP: 151/70 (15 Apr 2018 08:13) (145/69 - 199/83)  BP(mean): --  RR: 18 (15 Apr 2018 08:13) (18 - 18)  SpO2: 99% (15 Apr 2018 08:13) (98% - 99%) Daily     Daily   CAPILLARY BLOOD GLUCOSE        I&O's Summary      LABS:                      MEDICATIONS:  acetaminophen   Tablet 650 milliGRAM(s) Oral every 6 hours PRN  ALBUTerol    90 MICROgram(s) HFA Inhaler 1 Puff(s) Inhalation every 4 hours  ALBUTerol/ipratropium for Nebulization 3 milliLiter(s) Nebulizer every 6 hours PRN  amoxicillin  875 milliGRAM(s)/clavulanate 1 Tablet(s) Oral two times a day  carvedilol 25 milliGRAM(s) Oral every 12 hours  docusate sodium 100 milliGRAM(s) Oral two times a day  enoxaparin Injectable 40 milliGRAM(s) SubCutaneous daily  hydrALAZINE 25 milliGRAM(s) Oral every 8 hours  lidocaine   Patch 1 Patch Transdermal daily  methocarbamol 500 milliGRAM(s) Oral three times a day PRN  morphine  - Injectable 2 milliGRAM(s) IV Push every 6 hours PRN  NIFEdipine XL 90 milliGRAM(s) Oral daily  ondansetron Injectable 4 milliGRAM(s) IV Push every 6 hours PRN  oxyCODONE    IR 10 milliGRAM(s) Oral every 4 hours PRN  oxyCODONE    IR 5 milliGRAM(s) Oral every 4 hours PRN  pantoprazole    Tablet 40 milliGRAM(s) Oral before breakfast  saccharomyces boulardii 250 milliGRAM(s) Oral two times a day  tiotropium 18 MICROgram(s) Capsule 1 Capsule(s) Inhalation daily

## 2018-04-16 VITALS
OXYGEN SATURATION: 95 % | DIASTOLIC BLOOD PRESSURE: 77 MMHG | TEMPERATURE: 99 F | SYSTOLIC BLOOD PRESSURE: 168 MMHG | RESPIRATION RATE: 18 BRPM | HEART RATE: 89 BPM

## 2018-04-16 PROCEDURE — 85610 PROTHROMBIN TIME: CPT

## 2018-04-16 PROCEDURE — 94640 AIRWAY INHALATION TREATMENT: CPT

## 2018-04-16 PROCEDURE — 36415 COLL VENOUS BLD VENIPUNCTURE: CPT

## 2018-04-16 PROCEDURE — 81001 URINALYSIS AUTO W/SCOPE: CPT

## 2018-04-16 PROCEDURE — 87150 DNA/RNA AMPLIFIED PROBE: CPT

## 2018-04-16 PROCEDURE — 71046 X-RAY EXAM CHEST 2 VIEWS: CPT

## 2018-04-16 PROCEDURE — 87449 NOS EACH ORGANISM AG IA: CPT

## 2018-04-16 PROCEDURE — 83690 ASSAY OF LIPASE: CPT

## 2018-04-16 PROCEDURE — 71045 X-RAY EXAM CHEST 1 VIEW: CPT

## 2018-04-16 PROCEDURE — 99239 HOSP IP/OBS DSCHRG MGMT >30: CPT

## 2018-04-16 PROCEDURE — 96375 TX/PRO/DX INJ NEW DRUG ADDON: CPT

## 2018-04-16 PROCEDURE — 96376 TX/PRO/DX INJ SAME DRUG ADON: CPT

## 2018-04-16 PROCEDURE — 87040 BLOOD CULTURE FOR BACTERIA: CPT

## 2018-04-16 PROCEDURE — 85027 COMPLETE CBC AUTOMATED: CPT

## 2018-04-16 PROCEDURE — 86901 BLOOD TYPING SEROLOGIC RH(D): CPT

## 2018-04-16 PROCEDURE — 93005 ELECTROCARDIOGRAM TRACING: CPT

## 2018-04-16 PROCEDURE — 74177 CT ABD & PELVIS W/CONTRAST: CPT

## 2018-04-16 PROCEDURE — 99285 EMERGENCY DEPT VISIT HI MDM: CPT | Mod: 25

## 2018-04-16 PROCEDURE — 86900 BLOOD TYPING SEROLOGIC ABO: CPT

## 2018-04-16 PROCEDURE — 86850 RBC ANTIBODY SCREEN: CPT

## 2018-04-16 PROCEDURE — 71250 CT THORAX DX C-: CPT

## 2018-04-16 PROCEDURE — 96374 THER/PROPH/DIAG INJ IV PUSH: CPT | Mod: XU

## 2018-04-16 PROCEDURE — 87493 C DIFF AMPLIFIED PROBE: CPT

## 2018-04-16 PROCEDURE — 87186 SC STD MICRODIL/AGAR DIL: CPT

## 2018-04-16 PROCEDURE — 80053 COMPREHEN METABOLIC PANEL: CPT

## 2018-04-16 RX ORDER — SACCHAROMYCES BOULARDII 250 MG
1 POWDER IN PACKET (EA) ORAL
Qty: 14 | Refills: 0 | OUTPATIENT
Start: 2018-04-16

## 2018-04-16 RX ORDER — NIFEDIPINE 30 MG
1 TABLET, EXTENDED RELEASE 24 HR ORAL
Qty: 30 | Refills: 0 | OUTPATIENT
Start: 2018-04-16

## 2018-04-16 RX ADMIN — Medication 1 TABLET(S): at 06:12

## 2018-04-16 RX ADMIN — OXYCODONE HYDROCHLORIDE 10 MILLIGRAM(S): 5 TABLET ORAL at 10:26

## 2018-04-16 RX ADMIN — Medication 250 MILLIGRAM(S): at 06:12

## 2018-04-16 RX ADMIN — OXYCODONE HYDROCHLORIDE 10 MILLIGRAM(S): 5 TABLET ORAL at 06:12

## 2018-04-16 RX ADMIN — Medication 25 MILLIGRAM(S): at 06:12

## 2018-04-16 RX ADMIN — OXYCODONE HYDROCHLORIDE 10 MILLIGRAM(S): 5 TABLET ORAL at 11:26

## 2018-04-16 RX ADMIN — LIDOCAINE 1 PATCH: 4 CREAM TOPICAL at 12:05

## 2018-04-16 RX ADMIN — PANTOPRAZOLE SODIUM 40 MILLIGRAM(S): 20 TABLET, DELAYED RELEASE ORAL at 06:12

## 2018-04-16 RX ADMIN — OXYCODONE HYDROCHLORIDE 10 MILLIGRAM(S): 5 TABLET ORAL at 06:42

## 2018-04-16 RX ADMIN — Medication 90 MILLIGRAM(S): at 06:12

## 2018-04-16 RX ADMIN — CARVEDILOL PHOSPHATE 25 MILLIGRAM(S): 80 CAPSULE, EXTENDED RELEASE ORAL at 06:12

## 2018-04-16 NOTE — PROGRESS NOTE ADULT - PROBLEM SELECTOR PLAN 1
CT results as above  PNA limited to right lung fields - suggestive of aspiration PNA especially given recent vomiting hx  Will continue with IV abx (day 2 of azithro and rocephin)  Follow up cultures
Suspected Gram Neg  Sstarted zosyn.  Initial Blood cx : Coag Neg staph, Likely contaminant.  saturating well on RA.  Awaiting repeat cultures. . Discussed need for followup imaging as outpatient to ensure resolution of findings.
- CT results as above  - PNA limited to right lung fields - suggestive of aspiration PNA especially given recent vomiting hx  - Will continue with IV abx (day 4 of abx.. was on azitro/rocephin x 2 days, and then placed on zosyn when BCx resulted positive)
Patient reports increased pain, poor relief with current regimen.  Meds:  -Adjust oxycodone to 5/10mg [id number] PRN mild/moderate pain  -Continue morphine IVP q6 for severe pain  -Continue tylenol  -Discontinue lyrica/gabapentin as patient states she cannot tolerate due to side effects.  -Start lidoderm patches.  -Start robaxin 500mg bid prn spasms    Discussed above plan with patient. She is in agreement and thankful for medication adjustment.   Pending Discharge tomorrow per pt. She is out of pain medication. Missed appt at our office yesterday. Contacted DEBI Suggs at [facility]. She will send two weeks of medication until pt follows up in office outpatient. Patient advised of above and thankful.    PATIENT WILL NOT NEED OPIATES AT TIME OF DISCHARGE.    Will continue to follow. call with any pain questions 
Suspected Gram Neg  Continue Zosyn.  Coag negative staph felt to be contaminant.  Awaiting repeat cultures.  Discussed with ID.  Repeat CXR PA -Lat showed no worsening.  D/w patient need for repeat imaging 2-4 weeks post abx course to ensure resolution.
Suspected Gram Neg  Continue Zosyn.  Coag negative staph felt to be contaminant.  Awaiting repeat cultures.  Discussed with ID.  Repeat CXR PA -Lat showed no worsening.  D/w patient need for repeat imaging 2-4 weeks post abx course to ensure resolution.
Suspected Gram Neg  Continue Zosyn.  Coag negative staph felt to be contaminant.  Awaiting repeat cultures.  Will d/w ID.  Repeat CXR PA -Lat
Suspected Gram Neg  Continue Zosyn.  Coag negative staph felt to be contaminant.  Awaiting repeat cultures.  Discussed with ID.  Repeat CXR PA -Lat showed no worsening.  D/w patient need for repeat imaging 2-4 weeks post abx course to ensure resolution.
Suspected Gram Neg  started zosyn   Initial Blood cx : Coag Neg staph, Likely contaminant   saturating well on RA  repeat Blood cx sent today and stopped vancomycin

## 2018-04-16 NOTE — PROGRESS NOTE ADULT - PROVIDER SPECIALTY LIST ADULT
Hospitalist
Internal Medicine
Pain Medicine
Hospitalist
Internal Medicine

## 2018-04-16 NOTE — PROGRESS NOTE ADULT - PROBLEM SELECTOR PROBLEM 1
Pneumonia of right lower lobe due to infectious organism
Pneumonia of right lower lobe due to infectious organism
Low back pain with left-sided sciatica, unspecified back pain laterality, unspecified chronicity
Pneumonia of right lower lobe due to infectious organism

## 2018-04-16 NOTE — PROGRESS NOTE ADULT - SUBJECTIVE AND OBJECTIVE BOX
Chief Complaint:    HPI:  62 y/o female came to ER, sent in by her surgeon as she started  abdominal pain which was more in the rt. upper to lower quadrant, 8 out of 10 and constant, associated with n/v. pt. reports fever of 102 last night . no diarrhea. had a loose BM this am. pt. has noticed lack of flatus. As per pt. her sympt. were similar to her  prior bowl obstruction for which she got surgery about 1 year ago. pt. reports no cough. no phlegm no sick contact. no swallowing problem. no flu like sympt. Pt. has chronic low back pain and is not very mobile person. In the ER pt's ct abdomen did not show bowl obstruction but has shown rt. middle and lower lobe infiltrate. no cp. no sob. pt. was given food in the ER which she tolerated and reports no n/v. pt. reports that she does not take escitalopram any more. (2018 23:11)      PAST MEDICAL & SURGICAL HISTORY:  Varicose veins: left lower extremity  Pacemaker: reports shut off  Urinary incontinence, unspecified type  Low back pain with left-sided sciatica, unspecified back pain laterality, unspecified chronicity  Malignant neoplasm of ovary, unspecified laterality  Stented coronary artery  CAD (coronary artery disease)  Thalassemia, unspecified type  CVA (cerebral vascular accident)  HTN (hypertension)  S/P cholecystectomy  History of appendectomy  S/P spinal surgery: S/P spinal cord stimulator placed.  Artificial cardiac pacemaker: Reports &quot;shut off.&quot;  H/O abdominal hysterectomy  H/O laminectomy      FAMILY HISTORY:  Family history of uterine cancer (Sibling)  Family history of cerebrovascular accident (CVA)  Family history of hypertension  Family history of diabetes mellitus (DM)      SOCIAL HISTORY:  Denies Smoking, Alcohol, or Drug Use    Allergies    Dilaudid (Nausea)  peanuts (Hives)      PAIN MEDICATIONS:  acetaminophen   Tablet 650 milliGRAM(s) Oral every 6 hours PRN  methocarbamol 500 milliGRAM(s) Oral three times a day PRN  morphine  - Injectable 2 milliGRAM(s) IV Push every 6 hours PRN  ondansetron Injectable 4 milliGRAM(s) IV Push every 6 hours PRN  oxyCODONE    IR 10 milliGRAM(s) Oral every 4 hours PRN  oxyCODONE    IR 5 milliGRAM(s) Oral every 4 hours PRN    Heme:  enoxaparin Injectable 40 milliGRAM(s) SubCutaneous daily    Antibiotics:  amoxicillin  875 milliGRAM(s)/clavulanate 1 Tablet(s) Oral two times a day    Cardiovascular:  carvedilol 25 milliGRAM(s) Oral every 12 hours  hydrALAZINE 25 milliGRAM(s) Oral every 8 hours  NIFEdipine XL 90 milliGRAM(s) Oral daily    GI:  docusate sodium 100 milliGRAM(s) Oral two times a day  pantoprazole    Tablet 40 milliGRAM(s) Oral before breakfast    Endocrine:    All Other Medications:  lidocaine   Patch 1 Patch Transdermal daily  saccharomyces boulardii 250 milliGRAM(s) Oral two times a day        RADIOLOGY:          Vital Signs Last 24 Hrs  T(C): 36.5 (2018 08:05), Max: 37.1 (15 Apr 2018 16:14)  T(F): 97.7 (2018 08:05), Max: 98.8 (15 Apr 2018 16:14)  HR: 75 (2018 08:05) (74 - 78)  BP: 146/69 (2018 08:05) (129/67 - 146/69)  BP(mean): --  RR: 18 (2018 08:05) (18 - 18)  SpO2: 93% (2018 08:05) (92% - 96%)    PAIN SCORE:     7    SCALE USED: (1-10)    OTHER SYMPTOMS (0 = None;  1 = Mild; 2 = Moderate; 3 = Severe)  Anorexia: 0          Dyspnea:0         Pruritus:0         Nausea:0             Agitation:0        Anxiety:0    Vomitin          Drowsiness:0    Depression:0  Constipation:0    Diarrhea:0        Other:0           PHYSICAL EXAM:    GENERAL: NAD, well-groomed, well-developed  NERVOUS SYSTEM:  Alert & Oriented X3, Good concentration;

## 2018-04-16 NOTE — PROGRESS NOTE ADULT - SUBJECTIVE AND OBJECTIVE BOX
Patient: DAMEON GOMEZ [id number] 63y Female                 Internal Medicine Hospitalist Progress Note - Dr. Florentin Ariza  Chief Complaint: Patient is a 63y old  Female who presents with a chief complaint of pneumonia (10 Apr 2018 01:18)    HPI:  62 y/o female with PMH CAD, pacemaker HTN, HLD, chronic low back pain, hx of ovarian cancer s/o complete hysterectomy, appendectomy, cholecystectomy, hx of bowel obstruction requiring SBR 1 yr ago who came to ER for worsening RUQ abdominal pain, 8/10, constant, assocated with n/v and temp of 102 orally last night.  In the ER, leukocytosis on labs, ct abdomen did not show bowl obstruction but has shown rt. middle and lower lobe infiltrate. CT chest: multifocal low right upper lobe and right lower lobe airspace consolidations concerning for infectious pneumonia. Pt started on IV abx. Consulted by surgery, no SBO indications/surgical indications. Side note: **was seen by behavioral health on last admission for psychosocial stressors, started on lexapro, as per pt does not take anymore.  3/9 Blood Cx positive for coag negative staph.  Repeat Culture negative.    No SOB.  No fever / chills.  SaO2 98-99% on RA.  Ambulates freely.  No additional complaints.  BP better controlled.     ____________________PHYSICAL EXAM:  Vitals reviewed as indicated below  GENERAL:  NAD Alert and Oriented x 3   HEENT: NCAT  CARDIOVASCULAR:  S1, S2  LUNGS: coarse BS R base.  ABDOMEN:  soft, (-) tenderness, (-) distension, (+) bowel sounds, (-) guarding, (-) rebound (-) rigidity  EXTREMITIES:  no cyanosis / clubbing / edema.   ____________________    VITALS:  Vital Signs Last 24 Hrs  T(C): 36.5 (2018 08:05), Max: 37.1 (15 Apr 2018 16:14)  T(F): 97.7 (2018 08:05), Max: 98.8 (15 Apr 2018 16:14)  HR: 75 (2018 08:05) (74 - 78)  BP: 146/69 (2018 08:05) (129/67 - 146/69)  BP(mean): --  RR: 18 (2018 08:05) (18 - 18)  SpO2: 93% (2018 08:05) (92% - 96%) Daily     Daily Weight in k.1 (15 Apr 2018 15:32)  CAPILLARY BLOOD GLUCOSE        I&O's Summary      LABS:                      MEDICATIONS:  acetaminophen   Tablet 650 milliGRAM(s) Oral every 6 hours PRN  ALBUTerol    90 MICROgram(s) HFA Inhaler 1 Puff(s) Inhalation every 4 hours  ALBUTerol/ipratropium for Nebulization 3 milliLiter(s) Nebulizer every 6 hours PRN  amoxicillin  875 milliGRAM(s)/clavulanate 1 Tablet(s) Oral two times a day  carvedilol 25 milliGRAM(s) Oral every 12 hours  docusate sodium 100 milliGRAM(s) Oral two times a day  enoxaparin Injectable 40 milliGRAM(s) SubCutaneous daily  hydrALAZINE 25 milliGRAM(s) Oral every 8 hours  lidocaine   Patch 1 Patch Transdermal daily  methocarbamol 500 milliGRAM(s) Oral three times a day PRN  morphine  - Injectable 2 milliGRAM(s) IV Push every 6 hours PRN  NIFEdipine XL 90 milliGRAM(s) Oral daily  ondansetron Injectable 4 milliGRAM(s) IV Push every 6 hours PRN  oxyCODONE    IR 10 milliGRAM(s) Oral every 4 hours PRN  oxyCODONE    IR 5 milliGRAM(s) Oral every 4 hours PRN  pantoprazole    Tablet 40 milliGRAM(s) Oral before breakfast  saccharomyces boulardii 250 milliGRAM(s) Oral two times a day  tiotropium 18 MICROgram(s) Capsule 1 Capsule(s) Inhalation daily

## 2018-04-16 NOTE — PROGRESS NOTE ADULT - ASSESSMENT
64 y/o female seen and examined at bedside, sleeping comfortably before arousal, c/o 7/10 pain in lower back  -She is my patient in outpatient pain management, she will f/u with us after being d/c  - NO changes to pain medications MADE  -We will continue to follow

## 2018-04-16 NOTE — PROGRESS NOTE ADULT - ASSESSMENT
62 y/o female with PMH CAD, pacemaker HTN, HLD, chronic low back pain, hx of ovarian cancer s/o complete hysterectomy, appendectomy, cholecystectomy, hx of bowel obstruction requiring SBR 1 yr ago who came to ER for worsening RUQ abdominal pain, 8/10, constant, assocated with n/v and temp of 102 orally last night.  In the ER, leukocytosis on labs, ct abdomen did not show bowl obstruction but has shown rt. middle and lower lobe infiltrate. CT chest: multifocal low right upper lobe and right lower lobe airspace consolidations concerning for infectious pneumonia. Pt started on IV abx. Consulted by surgery, no SBO indications/surgical indications. Side note: **was seen by behavioral health on last admission for psychosocial stressors, started on lexapro, as per pt does not take anymore.     > Anemia - pt reports h/o chronic anemia / thalassemia.  H/H was stable.  No indication at present of acute blood loss.    > HTN - BP better controlled.  Continue Hydralazine, Procardia.

## 2018-04-17 ENCOUNTER — APPOINTMENT (OUTPATIENT)
Dept: PULMONOLOGY | Facility: CLINIC | Age: 63
End: 2018-04-17

## 2018-04-18 LAB
CULTURE RESULTS: SIGNIFICANT CHANGE UP
SPECIMEN SOURCE: SIGNIFICANT CHANGE UP

## 2018-07-16 PROBLEM — Z95.0 PRESENCE OF CARDIAC PACEMAKER: Chronic | Status: ACTIVE | Noted: 2017-02-07

## 2018-11-27 NOTE — ED PROVIDER NOTE - NS ED MD EM SELECTION
Spoke with mom/Nolvia whom states Hayse (10 yr) is saying his chest hurts when he swallows. States child called her from school today complaining of this pain. Reports Hayes is having anxiety issues which they are working at with Behavorial Health but voices concern that she does not want to relate all things to his anxiety. States Hayes has sinus congestion and very runny nose, no fever/chills, no cough. Asking if Hayes could be possibly having acid reflux due to his anxiety and if child should be seen by PCP. Advised appt with MD and offered several time options this afternoon with mom stating she has to be in [place] today for a 1400 client. Asking if Hayes could be seen today at 1600.   Reassured will inform PCP with call back of plan of care.   [id number] Comprehensive

## 2019-01-29 PROBLEM — R32 UNSPECIFIED URINARY INCONTINENCE: Chronic | Status: ACTIVE | Noted: 2017-02-07

## 2019-01-29 PROBLEM — I86.8 VARICOSE VEINS OF OTHER SPECIFIED SITES: Chronic | Status: ACTIVE | Noted: 2017-02-07

## 2019-01-29 PROBLEM — M54.42 LUMBAGO WITH SCIATICA, LEFT SIDE: Chronic | Status: ACTIVE | Noted: 2017-02-07

## 2019-03-13 NOTE — ED ADULT NURSE NOTE - PSH
Leg and Ankle Edema: Care Instructions  Your Care Instructions  Swelling in the legs, ankles, and feet is called edema. It is common after you sit or stand for a while. Long plane flights or car rides often cause swelling in the legs and feet. You may also have swelling if you have to stand for long periods of time at your job. Problems with the veins in the legs (varicose veins) and changes in hormones can also cause swelling. Sometimes the swelling in the ankles and feet is caused by a more serious problem, such as heart failure, infection, blood clots, or liver or kidney disease. Follow-up care is a key part of your treatment and safety. Be sure to make and go to all appointments, and call your doctor if you are having problems. It's also a good idea to know your test results and keep a list of the medicines you take. How can you care for yourself at home? · If your doctor gave you medicine, take it as prescribed. Call your doctor if you think you are having a problem with your medicine. · Whenever you are resting, raise your legs up. Try to keep the swollen area higher than the level of your heart. · Take breaks from standing or sitting in one position. ? Walk around to increase the blood flow in your lower legs. ? Move your feet and ankles often while you stand, or tighten and relax your leg muscles. · Wear support stockings. Put them on in the morning, before swelling gets worse. · Eat a balanced diet. Lose weight if you need to. · Limit the amount of salt (sodium) in your diet. Salt holds fluid in the body and may increase swelling. When should you call for help? Call 911 anytime you think you may need emergency care. For example, call if:    · You have symptoms of a blood clot in your lung (called a pulmonary embolism). These may include:  ? Sudden chest pain. ? Trouble breathing. ?  Coughing up blood.    Call your doctor now or seek immediate medical care if:    · You have signs of a blood clot, such as:  ? Pain in your calf, back of the knee, thigh, or groin. ? Redness and swelling in your leg or groin.     · You have symptoms of infection, such as:  ? Increased pain, swelling, warmth, or redness. ? Red streaks or pus. ? A fever.    Watch closely for changes in your health, and be sure to contact your doctor if:    · Your swelling is getting worse.     · You have new or worsening pain in your legs.     · You do not get better as expected. Where can you learn more? Go to http://asng-negar.info/. Enter F138 in the search box to learn more about \"Leg and Ankle Edema: Care Instructions. \"  Current as of: September 23, 2018  Content Version: 11.9  © 9020-5309 [employer]. Care instructions adapted under license by [employer] (which disclaims liability or warranty for this information). If you have questions about a medical condition or this instruction, always ask your healthcare professional. [place] [address] any warranty or liability for your use of this information. Artificial cardiac pacemaker  Reports "shut off."  H/O abdominal hysterectomy    H/O laminectomy    S/P spinal surgery  S/P spinal cord stimulator placed.

## 2020-06-26 NOTE — PATIENT PROFILE ADULT. - HEALTH/HEALTHCARE ANXIETIES, PROFILE
that I will be here a long time Complex Repair And Z Plasty Text: The defect edges were debeveled with a #15 scalpel blade.  The primary defect was closed partially with a complex linear closure.  Given the location of the remaining defect, shape of the defect and the proximity to free margins a Z plasty was deemed most appropriate for complete closure of the defect.  Using a sterile surgical marker, an appropriate advancement flap was drawn incorporating the defect and placing the expected incisions within the relaxed skin tension lines where possible.    The area thus outlined was incised deep to adipose tissue with a #15 scalpel blade.  The skin margins were undermined to an appropriate distance in all directions utilizing iris scissors.

## 2020-10-14 NOTE — ASU PATIENT PROFILE, ADULT - TRANSFUSION REACTION, PREVIOUS, PROFILE
Scheduling outreach call to review Health Maintenance and / or schedule appointment. AWV done 6-11-20 w/ A. Born  Colonoscopy due  [facility] [address] GAP YES  Lab work Lipid-CMP  Mammogram n/a  PHQ-9 done  Last appointment 1-3-20 w/  - 6-11-20 w/ A. Born  Upcoming appointment no  Scanned and updated HM yes    I spoke with patient appointment made 11-3-20 hemolytic reaction

## 2020-12-30 NOTE — ED PROVIDER NOTE - LOCATION
PT. SITTING UP IN BED WITH NO DISTRESS NOTED; DENIES NEEDS/PAIN. ENCOURAGED TO
CALL FOR ANY NEEDS. CALL LIGHT IS IN REACH. suprapubic region

## 2021-03-17 NOTE — PROGRESS NOTE ADULT - ASSESSMENT
Had HLD with elevated LDL - 171, 4 months ago.   Has been started on Lipitor 20, but no recheck.  Denies muscle aches or pains.  No follow-up labs yet.   - will get new LFT's and Lipids.     This 63 year old female was seen and examined at bedside today 4/11/2018. She is alert and well oriented X4 and is in NAD.  Patient denies fever, pain, headache, or any other type of discomfort or acute problems. Patient is laughing, communicating well and is in good spirits.    PHYSICAL EXAM:    GENERAL: NAD, well-groomed, well-developed  HEAD:  Atraumatic, Normocephalic  EYES: EOMI, PERRLA, conjunctiva and sclera clear  ENMT:  Moist mucous membranes,  No lesions  NECK: Supple, No JVD, Normal thyroid  NERVOUS SYSTEM:  Alert & Oriented X4;  Moves upper and lower extremities; DTRs 2+ intact and symmetric  CHEST/LUNG: Clear to auscultation bilaterally; No rales, rhonchi, wheezing,   HEART: Regular rate and rhythm; No murmurs,   ABDOMEN: Soft, Nontender, Nondistended; Bowel sounds present  EXTREMITIES:  Peripheral Pulses, No  cyanosis, or edema  SKIN: No rashes or lesions

## 2021-05-09 NOTE — H&P ADULT - ASSESSMENT
63 y/o F with chronic back pain presented to ED with abdominal pain for 1 day a/w N/V  CT shows high grade SBO no

## 2021-10-29 NOTE — BEHAVIORAL HEALTH ASSESSMENT NOTE - NSBHREFERIPTEAMCONTACT_PSY_A_CORE_FT
OFFICE VISIT    Patient: Trinity Holt   : 1947 MRN: [id number]    SUBJECTIVE:  Chief Complaint   Patient presents with   • Follow-up       Patient has given consent to record this visit for documentation in their clinical record.    A 74 year old female presents for a followup for hypothyroidism and constipation.  Had visited last time in May 2021.    HISTORY OF PRESENT ILLNESS:  Hypothyroidism, unspecified type   Currently, she is taking 100 mcg Levothyroxine in the morning empty stomach, as she felt it can interact with the magnesium she is taking at night. Her medication has been changed in the last visit. Has been taking vitamin B12 2000 mcg every day before seeing me. States she was told that her thyroid level has been affected because of the high intake of vitamin B12. Requests for the thyroid screening.    Chronic constipation   Currently, has bloating with distended abdomen with no abdominal pain. Having bowel movement every day; however, sometimes has small and loose stool. Managed by Docusate sodium 100 mg 1-2 pills at night every day. States she was doing well, until started having bowel problem since few days after having Senakot for 3 times a week or every other day. Saw Dr. Byers in 2021, XRs showed moderate stool burden.. Saw GI doctor. Has scheduled for CT abdomen for yesterday, ordered by Dr. Cortez.  Not sure if she wants to go through with it because of iodine allergy.  She would need steroidsHas tried Miralax and Magnesium citrate, and Easy go with no relief. Has no fungal rash on the feet. She took castor oil once a week for more than 3 months, prescribed by the pharmacist for the impacted bowel, which was beneficial. Inquires if she can hold back for the CT scan. Requests to start back with the laxatives.    Need for immunization against influenza   Due.    Additional hypertension  Hypertension  Her blood pressure measures 149/88 mmHg today. It measures 148/85 mmHg the second  time.    Raynaud disease  States her problem aggravates on any exposure to cold air, therefore, she wears socks.    Healthcare maintenance  Gyn/ob  Had menopause probably at the age of 58.     Colon cancer screening  Had screening colonoscopy probably in 2014.    Depression  She has had recent stress, dealing with her  who has mild cognitive impairment, and behavioral changes, as well as her nephew who is overall depressed and had a stroke in the past.    PAST MEDICAL HISTORY:  Past Medical History:   Diagnosis Date   • Breast cancer (CMS/HCC)     Right-sided lumpectomy with radiation   • Elevated coronary artery calcium score    • DANIELLE (generalized anxiety disorder)    • HTN (hypertension)    • Hypothyroidism    • Insomnia    • Lumbar degenerative disc disease     With left-sided sciatica   • Pure hypercholesterolemia      MEDICATIONS:  Current Outpatient Medications   Medication Sig Dispense Refill   • docusate sodium (Stool Softener) 100 MG tablet Take by mouth daily.     • Cyanocobalamin (B-12) 2000 MCG Tab Take by mouth daily.     • levothyroxine 100 MCG tablet Take 1 tablet by mouth daily. 90 tablet 0   • magnesium citrate 1.745 GM/30ML Solution Take by mouth once as needed.     • electrolyte/PEG 3350 (GOLYTELY) 236 g solution Take 4,000 mLs by mouth 1 day a week. 4000 mL 3   • aspirin 81 MG chewable tablet daily.     • estradiol (ESTRACE) 0.1 MG/GM vaginal cream I 0.5 GRAM VAGINALLY 2 TIMES A WK  4   • diazePAM (VALIUM) 5 MG tablet Take 1 tablet by mouth 2 times daily as needed for Anxiety. 60 tablet 2   • hydroCORTisone (CORTIZONE) 2.5 % cream Apply 1 application topically 2 times daily. 30 g 2   • Multiple Vitamins-Minerals (MULTI COMPLETE PO) Take 1 tablet by mouth daily.     • Flaxseed, Linseed, (FLAXSEED OIL PO) Take 1 tablet by mouth daily.     • Ergocalciferol 2000 units Cap Take 2,000 Units by mouth daily.     • Omega-3 Fatty Acids (FISH OIL) 1000 MG capsule Take 1 tablet by mouth daily.     •  zolpidem (AMBIEN) 5 MG tablet Take 2 tablets by mouth at bedtime. 180 tablet 0     No current facility-administered medications for this visit.     ALLERGIES:  Allergies as of 10/28/2021 - Reviewed 10/28/2021   Allergen Reaction Noted   • Iodinated diagnostic agents THROAT SWELLING 10/26/2021   • Tetracycline THROAT SWELLING 11/04/2011   • Clindamycin GI UPSET 08/02/2021     FAMILY HISTORY:  Family History   Problem Relation Age of Onset   • Systemic Lupus Erythematosus Mother    • COPD Father    • Diabetes Brother      SOCIAL HISTORY:  Social History     Tobacco Use   • Smoking status: Never Smoker   • Smokeless tobacco: Never Used   Substance Use Topics   • Alcohol use: Yes     Alcohol/week: 3.0 standard drinks     Types: 3 Glasses of wine per week   • Drug use: Never     Past Surgical HISTORY  Past Surgical History:   Procedure Laterality Date   • Breast lumpectomy Right    • Bunionectomy Right        REVIEW OF SYSTEMS:  [GI]: see HPI.  [Skin]: see HPI.  [Psych]: see HPI.      OBJECTIVE:  Visit Vitals  BP (!) 148/84   Pulse 79   Temp 97.5 °F (36.4 °C) (Oral)   Ht 5' 8\" (1.727 m)   Wt 71.6 kg (157 lb 11.8 oz)   SpO2 99%   BMI 23.98 kg/m²       PHYSICAL EXAM:  Constitutional: In no acute distress. Well-developed.  Eyes: The conjunctiva exhibited no abnormalities. The sclera was normal.  Abdomen: soft, non tender, non distended, normal bowel sounds and no abdominal mass. No hepatomegaly and no splenomegaly. No umbilical hernia was discovered.   Psychiatric: Oriented to time, place, and person. The mood was normal. The affect was normal. The memory was unimpaired.   Skin: Skin moisture and turgor normal.    DIAGNOSTIC STUDIES:  LAB RESULTS:  No visits with results within 1 Month(s) from this visit.   Latest known visit with results is:   Office Visit on 08/31/2021   Component Date Value Ref Range Status   • Sodium 08/31/2021 141  135 - 145 mmol/L Final   • Potassium 08/31/2021 5.0  3.4 - 5.1 mmol/L Final   • Chloride  08/31/2021 105  98 - 107 mmol/L Final   • Carbon Dioxide 08/31/2021 32  21 - 32 mmol/L Final   • Anion Gap 08/31/2021 9* 10 - 20 mmol/L Final   • Glucose 08/31/2021 92  65 - 99 mg/dL Final   • BUN 08/31/2021 13  6 - 20 mg/dL Final   • Creatinine 08/31/2021 0.82  0.51 - 0.95 mg/dL Final   • Glomerular Filtration Rate 08/31/2021 70  >=60 Final   • BUN/ Creatinine Ratio 08/31/2021 16  7 - 25 Final   • Calcium 08/31/2021 9.0  8.4 - 10.2 mg/dL Final   • Bilirubin, Total 08/31/2021 0.4  0.2 - 1.0 mg/dL Final   • GOT/AST 08/31/2021 16  <=37 Units/L Final   • GPT/ALT 08/31/2021 19  <64 Units/L Final   • Alkaline Phosphatase 08/31/2021 78  45 - 117 Units/L Final   • Albumin 08/31/2021 3.6  3.6 - 5.1 g/dL Final   • Protein, Total 08/31/2021 6.8  6.4 - 8.2 g/dL Final   • Globulin 08/31/2021 3.2  2.0 - 4.0 g/dL Final   • A/G Ratio 08/31/2021 1.1  1.0 - 2.4 Final   • Hemoglobin A1C 08/31/2021 5.6  4.5 - 5.6 % Final   • Cholesterol 08/31/2021 216* <=199 mg/dL Final   • Triglycerides 08/31/2021 109  <=149 mg/dL Final   • HDL 08/31/2021 69  >=50 mg/dL Final   • LDL 08/31/2021 125  <=129 mg/dL Final   • Non-HDL Cholesterol 08/31/2021 147  mg/dL Final   • Cholesterol/ HDL Ratio 08/31/2021 3.1  <=4.4 Final   • Vitamin B12 08/31/2021 >2,000* 211 - 911 pg/mL Final   • Folate 08/31/2021 >24.0  >=5.5 ng/mL Final   • Vitamin D, 25-Hydroxy 08/31/2021 35.3  30.0 - 100.0 ng/mL Final   • TSH 08/31/2021 0.073* 0.350 - 5.000 mcUnits/mL Final   • WBC 08/31/2021 4.3  4.2 - 11.0 K/mcL Final   • RBC 08/31/2021 4.08  4.00 - 5.20 mil/mcL Final   • HGB 08/31/2021 13.0  12.0 - 15.5 g/dL Final   • HCT 08/31/2021 40.4  36.0 - 46.5 % Final   • MCV 08/31/2021 99.0  78.0 - 100.0 fl Final   • MCH 08/31/2021 31.9  26.0 - 34.0 pg Final   • MCHC 08/31/2021 32.2  32.0 - 36.5 g/dL Final   • RDW-CV 08/31/2021 14.0  11.0 - 15.0 % Final   • RDW-SD 08/31/2021 51.0* 39.0 - 50.0 fL Final   • PLT 08/31/2021 200  140 - 450 K/mcL Final   • NRBC 08/31/2021 0  <=0 /100 WBC  Final   • Neutrophil, Percent 08/31/2021 67  % Final   • Lymphocytes, Percent 08/31/2021 21  % Final   • Mono, Percent 08/31/2021 8  % Final   • Eosinophils, Percent 08/31/2021 3  % Final   • Basophils, Percent 08/31/2021 1  % Final   • Immature Granulocytes 08/31/2021 0  % Final   • Absolute Neutrophils 08/31/2021 2.9  1.8 - 7.7 K/mcL Final   • Absolute Lymphocytes 08/31/2021 0.9* 1.0 - 4.0 K/mcL Final   • Absolute Monocytes 08/31/2021 0.4  0.3 - 0.9 K/mcL Final   • Absolute Eosinophils  08/31/2021 0.1  0.0 - 0.5 K/mcL Final   • Absolute Basophils 08/31/2021 0.0  0.0 - 0.3 K/mcL Final   • Absolute Immmature Granulocytes 08/31/2021 0.0  0.0 - 0.2 K/mcL Final   • T4, Free 08/31/2021 1.2  0.8 - 1.5 ng/dL Final   • T3, Free 08/31/2021 2.5  2.2 - 4.0 pg/mL Final       ASSESSMENT AND PLAN:  This 74 year old female presents with :  1. Chronic constipation    2. Hypothyroidism, unspecified type    3. Need for immunization against influenza    4. Essential hypertension, benign    5. Primary insomnia        Orders Placed This Encounter   • XRAY ABDOMEN 1V   • INFLUENZA QUADRIVALENT HIGH DOSE PRES FREE 0.7 ML VACC,IM   • Free T3   • Free T4   • Thyroid Stimulating Hormone       PLAN:  Hypothyroidism, unspecified type  Her condition can be likely due to the vitamin B12 over intake.  Ordered free T3, free T4, and TSH.  Continue Levothyroxine 100 mcg in the morning.     Chronic constipation   Stable.  Reviewed and discussed the previous reports.  Ordered X-ray abdomen 1 V.  Continue taking Docusate sodium 100 mg 2 pills every day.  Continue taking Senakot on the alternate days to keep the regular bowel movement.  Suggested to use OTC laxative.  Advised to hold back for CT screening as she is feeling better now.  Explained that the distended abdomen would relieve with the bowel movement.  Discussed the steroid administration prior to the diagnostic screenings.    Need for immunization against influenza   Administered the  Influenza vaccine in the office today.    Additional plan  Hypertension  Elevated.    The patient's zolpidem was renewed, she is taking 10 mg nightly, has tried Tylenol PM, melatonin without any improvement.  Not exhibiting any daytime confusion or side effects from the medication.  Okay to continue with this as she is noticing improvements in energy level with adequate sleep    Advised to follow up on 11/17/2021 or sooner as needed.    Refer to orders.  Medical compliance with plan discussed and risks of non-compliance reviewed.  Patient education completed on disease process, etiology & prognosis.  Proper usage and side effects of medications reviewed & discussed.  Return to clinic as clinically indicated as discussed with the patient who verbalized understanding of the plan and is in agreement with the plan.    I,  Dr. Daquan Nichols, have created a visit summary document based on the audio recording between Dr. Bon Lackey DO and this patient for the physician to review, edit as needed, and authenticate.    Creation Date: 11/6/2021     I have reviewed and edited the visit summary above and attest that it is accurate.    Bon Lackey DO   call

## 2022-10-25 NOTE — H&P ADULT - REASON FOR ADMISSION
Per Dr. Quigley, patient to come in to clinic for evaluation. Patient called and will head into clinic.    abdominal pain

## 2023-04-01 NOTE — PHYSICAL THERAPY INITIAL EVALUATION ADULT - REFERRING PHYSICIAN, REHAB EVAL
Infectious Disease Progress Note    S: patient seen and examined. Afebrile. Feels better.pain controlled. No n/v/d. Tolerating abx.     Review of Systems:  Pertinent items are noted in subjective part of the note above   Constitutional:  Denies fever, chills, or sweats.   Eyes: Denies visual changes.  ENT: No hoarseness . No sore throat.   Respiratory:  No cough.  No shortness of breath.    Cardiovascular:  No chest pain.  No palpitations.    Gastrointestinal:  No abdominal pain.  No nausea, vomiting or diarrhea.  CNS:  No numbness.  No weakness.  No headaches   Musculoskeletal:  No joint pain.   Extremities: no edema.   Dermatologic:  No rash.     Psychiatric: No agitation  :  Denies urinary urgency, frequency or dysuria.  Hematology: No adenopathy.     O:Temp (24hrs), Av.8 °F (36.6 °C), Min:97.5 °F (36.4 °C), Max:98.4 °F (36.9 °C)  Systolic (24hrs), Av , Min:94 , Max:110   Diastolic (24hrs), Av, Min:49, Max:70       Visit Vitals  /49   Pulse 77   Temp 98.4 °F (36.9 °C)   Resp 16   Ht 5' 11\" (1.803 m)   Wt 78.1 kg (172 lb 2.9 oz)   SpO2 100%   BMI 24.01 kg/m²         GEN: NAD,   HEENT: NC/AT,   NECK: supple,  CV: S1, S2  PULM: CTAB  ABD: +bs, soft, NT/ND  MSK: dry dressing in place.   NEURO: CN II-XII grossly intact, strength and sensation intact b/l, no focal deficits  SKIN: no rashes, wounds or other lesions    Photo on patient's phone reviewed and no gross signs of infection    Medications:    Current Facility-Administered Medications   Medication   • cefTRIAXone (ROCEPHIN) syringe 2,000 mg   • HYDROcodone-acetaminophen (NORCO) 5-325 MG per tablet 1 tablet   • meloxicam (MOBIC) tablet 7.5 mg   • ondansetron (ZOFRAN ODT) disintegrating tablet 4 mg   • sodium chloride 0.9 % flush bag 25 mL   • sodium chloride (PF) 0.9 % injection 2 mL   • Vvxhehj-Apccd-Yodoymfj-TenofAF (GENVOYA) 010-228-108-10 MG per tablet 1 tablet   • acetaminophen (TYLENOL) tablet 650 mg   • metoPROLOL tartrate (LOPRESSOR)  tablet 12.5 mg   • tamsulosin (FLOMAX) capsule 0.4 mg       Infusions  Current Facility-Administered Medications   Medication Dose Route Frequency Provider Last Rate Last Admin          PRN  Current Facility-Administered Medications   Medication Dose Route Frequency Provider Last Rate Last Admin   • HYDROcodone-acetaminophen (NORCO) 5-325 MG per tablet 1 tablet  1 tablet Oral Q6H PRN Triston Chandler PA-C   1 tablet at 04/01/23 0457   • ondansetron (ZOFRAN ODT) disintegrating tablet 4 mg  4 mg Oral BID PRN Triston Chandler PA-C       • sodium chloride 0.9 % flush bag 25 mL  25 mL Intravenous PRN Claude Hugo DO       • acetaminophen (TYLENOL) tablet 650 mg  650 mg Oral Q4H PRN Yoly Berg MD   650 mg at 03/31/23 1549      Intake/Output last 3 shifts:  No intake/output data recorded.  Intake/Output this shift:  No intake/output data recorded.     Labs:  Recent Labs   Lab 03/31/23  0441   WBC 9.3   RBC 4.13*   HGB 12.3*   HCT 35.9*         Recent Labs   Lab 03/31/23  0441 03/30/23  0417 03/29/23  0641   SODIUM 137   < > 134*   POTASSIUM 3.8   < > 4.0   CHLORIDE 108   < > 108   CO2 27   < > 25   BUN 12   < > 13   CREATININE 0.84   < > 0.89   CALCIUM 8.6   < > 8.3*   ALBUMIN  --   --  3.1*   BILIRUBIN  --   --  1.2*   ALKPT  --   --  64   GPT  --   --  15   AST  --   --  14   GLUCOSE 115*   < > 85    < > = values in this interval not displayed.      Microbiology Results  (Last 10 results in the past 7 days)    Specimen   Gram Smear   Culture Result   Status       03/29/23  0851         No organisms seen.  [P]            Many Polymorphonuclear cells.  [P]            No epithelial cells seen.  [P]           03/28/23 2014         Moderate Gram positive cocci.  [P]            Many Polymorphonuclear cells.  [P]            Rare Epithelial cells.  [P]                 [P] - Preliminary Result            No components found for: URINALYSIS  No components found for: SPT  No components found for: INFABAG  No  components found for: [facility]    Imaging:    XR HAND 3 OR MORE VIEWS LEFT  Narrative: DICTATING PHYSICIAN:  Ron Causey M.D.    EXAM:  XR HAND 3 OR MORE VIEWS LEFT, 3/28/2023 4:28 PM    HISTORY: Soft tissue infection.    COMPARISON: None.  Impression: FINDINGS/IMPRESSION:   Severe soft tissue swelling of the left middle finger.  No underlying  cortical erosion or destructive osseous lesions to suggest acute  osteomyelitis.  No soft tissue gas or radiopaque foreign body.  No acute  fracture or dislocation.  Mild osteoarthrosis at the index  metacarpophalangeal and thumb carpometacarpal joints.    Electronically Signed by: RON CAUSEY MD   Signed on: 3/28/2023 4:52 PM   Workstation ID: [id number]  XR CHEST PA OR AP 1 VIEW  Narrative: EXAM:  XR CHEST AP OR PA  3/28/2023 4:28 PM    HISTORY: Fever    COMPARISON: 04/05/2020, 03/18/2020    FINDINGS: Portable view of the chest.  Support devices: None.  Cardiac silhouette and mediastinum: Cardiomediastinal silhouette is within  normal limits.    Pulmonary vessels: Normal pulmonary vasculature  Lungs: No focal consolidation.   Pleura: No significant effusion or pneumothorax.  Visualized abdomen: No abnormality.  Chest wall and Bones: No destructive lesions.  Impression: No radiographic evidence of acute cardiopulmonary disease.    Electronically Signed by: RON CAUSEY MD   Signed on: 3/28/2023 4:51 PM   Workstation ID: [id number]        MICROBIOLOGY    Microbiology Results  (Last 10 results in the past 7 days)    Specimen   Gram Smear   Culture Result   Status       03/29/23  0851         No organisms seen.  [P]            Many Polymorphonuclear cells.  [P]            No epithelial cells seen.  [P]           03/28/23 2014         Moderate Gram positive cocci.  [P]            Many Polymorphonuclear cells.  [P]            Rare Epithelial cells.  [P]                 [P] - Preliminary Result           3/28/23 Superficial cx with moderate strep agalactiae    3/29/23 OR cx ngtd    ASSESSMENT  Irvin Parra Jr. is a 46 year old male with PMH of HIV controlled on genvoya last CD4 305/27% and viral load <30 copies as of 10/2022, HTN, Asthma, recent surgery of left hand tendon repair in February 2023 presented to the ED for left hand 3rd digit swelling x 1 day. Patient febrile 39.1, hemodynamically stable on RA. Labs with cr 1.3, lfts WNR, WBC 9.2, hgb 15, plt 143, ESR 25, CRP 9.8, blood cx ngtd. Wound cx with gram stain showing GPC. X-ray of hand with Severe soft tissue swelling of the left middle finger and No underlying cortical erosion or destructive osseous lesions to suggest acute osteomyelitis. CXR unremarkable. Patient take to the OR and is s/p left middle finger irrigation and debridement with OR finding of left middle finger flexor tenosynovitis. Patient on vanc/cefepime. ID consulted for hand infection and hx of HIV on genvoya.     Left 3rd digit flexor tenosynovitis             -suspect staph aureus  Fever             -due to above  HIV             -controlled on Genvoya             -CD4 305/27% and HIV viral load <30 copies 11/2022   -CD4 53/10% on 3/29/23   -HIV viral load not detected 3/30/23    Elevated CRP     RECOMMENDATIONS:       Discontinue vancomycin/cefepime  Continue Ceftriaxone (3/31-present)  OR cultures strep  Follow up blood cx  Continue Genvoya  Low CD4 count may be related to acute infection given his history of compliance and recent labs; unusual for relative percentage to drop as well but HIV viral load is not detected so will follow up as outpatient   Hold off on MAC or PJP ppx at this time  Will discharge on PO Augmentin x 4 weeks   Surgery following for post-op care  Plan for bunny of wound as outpatient on Wednesday 4/5/23  Discussed with primary team  ID follow up in 2 weeks    Waqar Baker DO  4/1/2023  7:33 AM       Caitlin Hernández

## 2024-02-28 NOTE — OCCUPATIONAL THERAPY INITIAL EVALUATION ADULT - SHORT TERM MEMORY, REHAB EVAL
intact/appears WFLs
born in [place] and came to the [place] at age 5; single, domiciled w/ sister in [place], unemployed currently, with masters degree in public administration, no children

## 2024-03-04 ENCOUNTER — TRANSCRIPTION ENCOUNTER (OUTPATIENT)
Age: 69
End: 2024-03-04

## 2024-03-05 ENCOUNTER — OUTPATIENT (OUTPATIENT)
Dept: OUTPATIENT SERVICES | Facility: HOSPITAL | Age: 69
LOS: 1 days | End: 2024-03-05
Payer: MEDICARE

## 2024-03-05 DIAGNOSIS — Z90.49 ACQUIRED ABSENCE OF OTHER SPECIFIED PARTS OF DIGESTIVE TRACT: Chronic | ICD-10-CM

## 2024-03-05 DIAGNOSIS — Z90.710 ACQUIRED ABSENCE OF BOTH CERVIX AND UTERUS: Chronic | ICD-10-CM

## 2024-03-05 DIAGNOSIS — Z98.89 OTHER SPECIFIED POSTPROCEDURAL STATES: Chronic | ICD-10-CM

## 2024-03-05 DIAGNOSIS — Z98.890 OTHER SPECIFIED POSTPROCEDURAL STATES: Chronic | ICD-10-CM

## 2024-03-05 DIAGNOSIS — M54.16 RADICULOPATHY, LUMBAR REGION: ICD-10-CM

## 2024-03-05 DIAGNOSIS — Z95.0 PRESENCE OF CARDIAC PACEMAKER: Chronic | ICD-10-CM

## 2024-03-05 PROCEDURE — 76000 FLUOROSCOPY <1 HR PHYS/QHP: CPT

## 2024-03-05 PROCEDURE — 62323 NJX INTERLAMINAR LMBR/SAC: CPT

## 2024-07-18 NOTE — ASU PATIENT PROFILE, ADULT - TOBACCO USE
Nutrition Assessment   Reason for Consult/Assessment: Reassessment    Diagnosis and Hx: Reviewed     Pertinent Nutrition Information: Visited patient room - discussed with wife. Medications reviewed - probiotic, pepcid, lasix, abx, prednisone, senokot. Labs reviewed - Glucose 309, BUN/Creatinine high.    Diet Order: Enteral nutrition/tube feeding     Enteral nutrition/tube feeding: Jevity 1.2 at goal of 70 mL/hr      Diet tolerance: Tolerating nutrition support, no s/s of TF intolerance reported per wife. +BM x4 , 540 mL out from chest tube .  Food Allergies: None known    Demographic/Anthropometrics Information  Gender: male  Patient Age: 89 year old  Height:   Ht Readings from Last 1 Encounters:   07/10/24 5' 9\" (1.753 m)      Weight:   Wt Readings from Last 1 Encounters:   24 80 kg      BMI:   BMI Readings from Last 1 Encounters:   24 26.05 kg/m²       Usual Weight: 75.5 kg  % Weight Change:  71.6 kg,  80 kg - noted bed scale weights and (+) fluid balance       Estimated Needs:  Calculated Energy Needs: 1370-9446  kcal     Calculated protein needs: 73-88  g     Calculated Fluid Needs: 1ml/kcal        NFPE  Nutrition Focused Physical Exam  Physical Exam Completed: Yes (visual only, no overt signs of wasting) (24 1011 : Radha Kc, RD)      Treatment Plan/Interventions   Meals & snacks: NPO      Enteral Nutrition Formula: Jevity 1.2 Calorie  Rate: 70 mL/hr  Access Site: G-tube  Calories Provided by Tube Feedin kcal  Protein Provided by Tube Feedin g  Free Water Provided by Tube Feedin mL  Goal rate: 70 ml  Flushes: 120 ml every 4 hours       Goals & Monitoring  Intervention: Enteral nutrition    Goal: Tolerate enteral feeding goal, Meet >/equal 75% estimated needs   Intervention goal status: Goal achieved  Time frame to achieve goal: Ongoing    Dietitian will monitor: Enteral nutrition intake, Biochemical data, medical tests, procedures, Anthropometric  Measurements          Nutrition Diagnosis/PES   Nutrition Diagnosis: Inadequate oral intake     Related to: Difficulty swallowing   As evidenced by: Need for NPO statustube feeding at baseline to meet nutrition needs     Primary Nutrition Diagnosis status: Active nutrition diagnosis     Never smoker

## 2025-05-23 NOTE — PROGRESS NOTE ADULT - PROBLEM SELECTOR PLAN 2
1st  ATTEMPT:      I called and left patient a message in regards to her missed follow up virtual visit appointment on 5/21/2025 at 6:20 pm with Dr. Christina Guardado.  If the patient calls back, PACT Please reschedule patient's appointment.   Coag Neg staph, Likely contaminant.  Awaiting repeat culture results.